# Patient Record
Sex: FEMALE | Race: WHITE
[De-identification: names, ages, dates, MRNs, and addresses within clinical notes are randomized per-mention and may not be internally consistent; named-entity substitution may affect disease eponyms.]

---

## 2017-05-01 ENCOUNTER — HOSPITAL ENCOUNTER (OUTPATIENT)
Dept: HOSPITAL 22 - LAB | Age: 37
End: 2017-05-01
Attending: FAMILY MEDICINE
Payer: MEDICAID

## 2017-05-01 DIAGNOSIS — R53.83: Primary | ICD-10-CM

## 2017-05-01 LAB
BASOPHILS # BLD AUTO: 4.2 K/MM3 (ref 0.7–4.5)
BUN: 12 MG/DL (ref 7–18)
EOSINOPHIL NFR BLD AUTO: 30.8 % (ref 10–50)
GFR SERPLBLD BASED ON 1.73 SQ M-ARVRAT: 56 ML/MIN (ref 59–?)
HCT VFR BLD CALC: 13.3 G/DL (ref 12.2–16.2)

## 2017-05-03 LAB — RA LATEX TURBID.: 16.9 IU/ML (ref 0–13.9)

## 2017-09-14 ENCOUNTER — HOSPITAL ENCOUNTER (EMERGENCY)
Dept: HOSPITAL 22 - ER | Age: 37
Discharge: HOME | End: 2017-09-14
Payer: MEDICAID

## 2017-09-14 VITALS — HEIGHT: 62 IN | WEIGHT: 128 LBS | BODY MASS INDEX: 23.55 KG/M2

## 2017-09-14 VITALS — DIASTOLIC BLOOD PRESSURE: 95 MMHG | SYSTOLIC BLOOD PRESSURE: 134 MMHG

## 2017-09-14 DIAGNOSIS — L02.821: Primary | ICD-10-CM

## 2017-09-14 DIAGNOSIS — F17.210: ICD-10-CM

## 2017-09-14 DIAGNOSIS — Z88.3: ICD-10-CM
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
## 2017-09-18 ENCOUNTER — HOSPITAL ENCOUNTER (OUTPATIENT)
Dept: HOSPITAL 22 - 2ND | Age: 37
Setting detail: OBSERVATION
LOS: 1 days | Discharge: HOME | End: 2017-09-19
Attending: FAMILY MEDICINE | Admitting: FAMILY MEDICINE
Payer: MEDICAID

## 2017-09-18 VITALS — SYSTOLIC BLOOD PRESSURE: 138 MMHG | DIASTOLIC BLOOD PRESSURE: 90 MMHG

## 2017-09-18 VITALS — HEIGHT: 62 IN | WEIGHT: 116 LBS | BODY MASS INDEX: 21.35 KG/M2

## 2017-09-18 VITALS — SYSTOLIC BLOOD PRESSURE: 135 MMHG | DIASTOLIC BLOOD PRESSURE: 88 MMHG

## 2017-09-18 VITALS — DIASTOLIC BLOOD PRESSURE: 88 MMHG | SYSTOLIC BLOOD PRESSURE: 135 MMHG

## 2017-09-18 VITALS — DIASTOLIC BLOOD PRESSURE: 90 MMHG | SYSTOLIC BLOOD PRESSURE: 138 MMHG

## 2017-09-18 DIAGNOSIS — L03.221: Primary | ICD-10-CM

## 2017-09-18 DIAGNOSIS — Z72.0: ICD-10-CM

## 2017-09-18 LAB
BASOPHILS # BLD AUTO: 2.7 K/MM3 (ref 0.7–4.5)
BUN: 9 MG/DL (ref 7–18)
EOSINOPHIL NFR BLD AUTO: 17.4 % (ref 10–50)
GFR SERPLBLD BASED ON 1.73 SQ M-ARVRAT: 93 ML/MIN (ref 59–?)
HCT VFR BLD CALC: 14.4 G/DL (ref 12.2–16.2)
LYMPHOCYTES NFR SPEC AUTO: 70 % (ref 42–76)

## 2017-09-18 PROCEDURE — G0378 HOSPITAL OBSERVATION PER HR: HCPCS

## 2017-09-18 NOTE — PHARMACY CLINIC NOTE
Patient Demographics
 
Patient Demographics
Admission date:
17
 
Date: 17
Time: 161
Allergies
Coded Allergies:
sulfamethoxazole (From Bactrim) (Severe, ANAPHYLAXIS 17)
trimethoprim (From Bactrim) (Severe, ANAPHYLAXIS 17)
 
HEIGHT- FT: 5
IN: 2.00
K.617
 
 
VTE General Information
Disclaimer
The following section includes nursing documentation that has been pulled in for
pharmacy review.
 
Patient's VTE score: 0
Patient's VTE Risk: VERY LOW RISK
Clinical trial participant? No
 
VTE prophylaxis
NQF 0371
   VTE prophylaxis ordered? Yes
Type of prophylaxis/treatment: LESLYE
 
Electronically Signed by BELÉN YIN on 17 at 1613

## 2017-09-18 NOTE — PHARMACY CLINIC NOTE
Patient Demographics
 
Patient Demographics
Admission date:
17
 
Date: 17
Time: 161
Allergies
Coded Allergies:
sulfamethoxazole (From Bactrim) (Severe, ANAPHYLAXIS 17)
trimethoprim (From Bactrim) (Severe, ANAPHYLAXIS 17)
 
HEIGHT- FT: 5
IN: 2.00
K.617
 
 
VTE General Information
Disclaimer
The following section includes nursing documentation that has been pulled in for
pharmacy review.
 
Patient's VTE score: 0
Patient's VTE Risk: VERY LOW RISK
Clinical trial participant? No
 
VTE prophylaxis
NQF 0371
   VTE prophylaxis ordered? Yes
Type of prophylaxis/treatment: LESLYE
 
Electronically Signed by BELÉN YIN on 17 at 1612

## 2017-09-19 ENCOUNTER — HOSPITAL ENCOUNTER (OUTPATIENT)
Dept: HOSPITAL 22 - COP | Age: 37
End: 2017-09-19
Attending: FAMILY MEDICINE
Payer: MEDICAID

## 2017-09-19 VITALS — DIASTOLIC BLOOD PRESSURE: 61 MMHG | SYSTOLIC BLOOD PRESSURE: 104 MMHG

## 2017-09-19 VITALS — SYSTOLIC BLOOD PRESSURE: 138 MMHG | DIASTOLIC BLOOD PRESSURE: 78 MMHG

## 2017-09-19 VITALS — SYSTOLIC BLOOD PRESSURE: 119 MMHG | DIASTOLIC BLOOD PRESSURE: 81 MMHG

## 2017-09-19 VITALS — HEIGHT: 62 IN | BODY MASS INDEX: 21.16 KG/M2 | WEIGHT: 115 LBS

## 2017-09-19 VITALS — DIASTOLIC BLOOD PRESSURE: 78 MMHG | SYSTOLIC BLOOD PRESSURE: 138 MMHG

## 2017-09-19 DIAGNOSIS — L03.221: Primary | ICD-10-CM

## 2017-09-19 LAB
BASOPHILS # BLD AUTO: 3.7 K/MM3 (ref 0.7–4.5)
BUN: 5 MG/DL (ref 7–18)
EOSINOPHIL NFR BLD AUTO: 28.1 % (ref 10–50)
GFR SERPLBLD BASED ON 1.73 SQ M-ARVRAT: 111 ML/MIN (ref 59–?)
HCT VFR BLD CALC: 12.9 G/DL (ref 12.2–16.2)

## 2017-09-19 PROCEDURE — G0463 HOSPITAL OUTPT CLINIC VISIT: HCPCS

## 2017-09-19 NOTE — DISCHARGE SUMMARY STANDARD
Demographics
Admit date: 09/18/17
Discharge date: 09/19/17
 
History of present illness
History of present illness
this wf dev post neck swelling with tenderness and was seen in [facility] ed over the 
weekend and given abx- pain and swelling continued and went to another ed with i
/d and started on another abx- she was seen in the pcp office with persistant 
pain and swelling despite op rx and was admitted for iv abx - culture taken in 
office and no fever or diabetes but has pain and dec po intake -
 
Hospital Course
Hospital Course:
pt did well with ivf and painmeds and iv abx - doing better and wishes to 
continue as op- culture pending- no abscess and wound appears to be draining 
 
Discharge diagnoses
Problem List
 1. Cellulitis
 
 2. Tobacco use
 
 
Medications
Medications:
Discharge meds are as noted.
 
Comment:
will do vancomycin bid 1 g x 9 more days -with po pain meds and will see in 
office friday 
 
Follow up
Follow up in office in: 4 DAYS
with:
Adam Deluca MD
 
Electronically Signed by Adam Deluca MD on 09/19/17 at 5055

## 2017-09-19 NOTE — DISCHARGE SUMMARY STANDARD
Demographics
Admit date: 09/18/17
Discharge date: 09/19/17
 
History of present illness
History of present illness
this wf dev post neck swelling with tenderness and was seen in [place] ed over the 
weekend and given abx- pain and swelling continued and went to another ed with i
/d and started on another abx- she was seen in the pcp office with persistant 
pain and swelling despite op rx and was admitted for iv abx - culture taken in 
office and no fever or diabetes but has pain and dec po intake -
 
Hospital Course
Hospital Course:
pt did well with ivf and painmeds and iv abx - doing better and wishes to 
continue as op- culture pending- no abscess and wound appears to be draining 
 
Discharge diagnoses
Problem List
 1. Cellulitis
 
 2. Tobacco use
 
 
Medications
Medications:
Discharge meds are as noted.
 
Comment:
will do vancomycin bid 1 g x 9 more days -with po pain meds and will see in 
office friday 
 
Follow up
Follow up in office in: 4 DAYS
with:
Adam Deluca MD
 
Electronically Signed by Adam Deluca MD on 09/19/17 at 4735

## 2017-09-19 NOTE — ACUTE CARE PROGRESS NOTE (QUA)
Progress Notes
 
Subjective
Date 17
Time 0745
Note
doing better
Patient/family reports: feeling better
Nursing reports: pain
 
Objective
Findings
Last VS-Temp:98.4 B/P:138/78  Pulse:80 Resp:20 SaO2:99    ROOM AIR 
Last weight lbs:116 oz:0 K.617 Method:Bed Scales 
 
 
Exam
   General appearance: alert, awake
   Eyes: PERRLA
   ENT: dry mucous membranes
   Neck: no JVD
   Cardiovascular: regular rate & rhythm, no murmur
   Respiratory: no respiratory distress
   ABD: soft
   Genitourinary: no hematuria
   Extremities: moves all
   Musculoskeletal: equal muscle strength
   Skin: draining area post neck region , 3x2 cm 
   Neuro: alert, CNs II-XII nml as tested
Reviewed: allergies, medications, vital signs, lab results
 
Assessment/Plan
Problem List
 1. Cellulitis
 
 2. Tobacco use
 
Patient condition Improving
Plan: initiate discharge plan
This inpt stay is expected to cross 2 MNs from start of care Yes
Comments:
will d/c and continue care as op
 
Antibiotic Stewardship (2)
Current Culture Results
Microbiology
 1415  NECK: Wound Culture - RECD
 
 
Infxn that will respond? Yes
Right drug,dose,and route? Yes
More targeted antbx? No
How long atbx needed? 9
 
Electronically Signed by Adam Deluca MD on 17 at 0748

## 2017-09-20 ENCOUNTER — HOSPITAL ENCOUNTER (OUTPATIENT)
Dept: HOSPITAL 22 - COP | Age: 37
End: 2017-09-20
Attending: INTERNAL MEDICINE
Payer: MEDICAID

## 2017-09-20 VITALS — DIASTOLIC BLOOD PRESSURE: 68 MMHG | SYSTOLIC BLOOD PRESSURE: 118 MMHG

## 2017-09-20 VITALS — SYSTOLIC BLOOD PRESSURE: 119 MMHG | DIASTOLIC BLOOD PRESSURE: 72 MMHG

## 2017-09-20 VITALS — SYSTOLIC BLOOD PRESSURE: 116 MMHG | DIASTOLIC BLOOD PRESSURE: 64 MMHG

## 2017-09-20 DIAGNOSIS — L03.221: Primary | ICD-10-CM

## 2017-09-20 PROCEDURE — G0463 HOSPITAL OUTPT CLINIC VISIT: HCPCS

## 2017-09-21 ENCOUNTER — HOSPITAL ENCOUNTER (OUTPATIENT)
Dept: HOSPITAL 22 - COP | Age: 37
Discharge: HOME | End: 2017-09-21
Attending: FAMILY MEDICINE
Payer: MEDICAID

## 2017-09-21 VITALS — SYSTOLIC BLOOD PRESSURE: 126 MMHG | DIASTOLIC BLOOD PRESSURE: 81 MMHG

## 2017-09-21 VITALS — WEIGHT: 115 LBS | BODY MASS INDEX: 21.16 KG/M2 | HEIGHT: 62 IN

## 2017-09-21 VITALS — DIASTOLIC BLOOD PRESSURE: 78 MMHG | SYSTOLIC BLOOD PRESSURE: 145 MMHG

## 2017-09-21 DIAGNOSIS — L03.221: Primary | ICD-10-CM

## 2017-09-21 DIAGNOSIS — Z48.00: ICD-10-CM

## 2017-09-21 PROCEDURE — G0463 HOSPITAL OUTPT CLINIC VISIT: HCPCS

## 2017-09-22 ENCOUNTER — HOSPITAL ENCOUNTER (OUTPATIENT)
Dept: HOSPITAL 22 - COP | Age: 37
Discharge: HOME | End: 2017-09-22
Attending: FAMILY MEDICINE
Payer: MEDICAID

## 2017-09-22 VITALS — DIASTOLIC BLOOD PRESSURE: 70 MMHG | SYSTOLIC BLOOD PRESSURE: 122 MMHG

## 2017-09-22 VITALS — SYSTOLIC BLOOD PRESSURE: 123 MMHG | DIASTOLIC BLOOD PRESSURE: 70 MMHG

## 2017-09-22 VITALS — DIASTOLIC BLOOD PRESSURE: 71 MMHG | SYSTOLIC BLOOD PRESSURE: 128 MMHG

## 2017-09-22 DIAGNOSIS — L03.221: Primary | ICD-10-CM

## 2017-09-25 ENCOUNTER — HOSPITAL ENCOUNTER (OUTPATIENT)
Dept: HOSPITAL 22 - COP | Age: 37
Discharge: HOME | End: 2017-09-25
Attending: FAMILY MEDICINE
Payer: MEDICAID

## 2017-09-25 VITALS — SYSTOLIC BLOOD PRESSURE: 113 MMHG | DIASTOLIC BLOOD PRESSURE: 70 MMHG

## 2017-09-25 VITALS — DIASTOLIC BLOOD PRESSURE: 79 MMHG | SYSTOLIC BLOOD PRESSURE: 114 MMHG

## 2017-09-25 VITALS — SYSTOLIC BLOOD PRESSURE: 109 MMHG | DIASTOLIC BLOOD PRESSURE: 74 MMHG

## 2017-09-25 DIAGNOSIS — L03.221: Primary | ICD-10-CM

## 2017-09-25 PROCEDURE — G0463 HOSPITAL OUTPT CLINIC VISIT: HCPCS

## 2017-10-11 NOTE — EXTERNAL MEDICAL SUMMARY RPT
Patient Summary
 Created on: 10/06/2017
 
ANTONI NAIR
External Reference #: [id number]
: 76
Sex: Female
 
Demographics
 
 
 
 Address  [address] [place]  [address]
 
 Preferred Language  English
 
 Marital Status  Unknown
 
 Temple Affiliation  Unknown
 
 Race  Unknown
 
 Ethnic Group  Unknown
 
 
Author
 
 
 
 Author            ,            ALFREDO RUIZROSALVA
 
 Address  Unknown
 
 Phone  [email or web address]
 
 
 
Care Team Providers
 
 
 
 Care Team Member Name  Role  Phone
 
 CITLALLI MOSQUERA,   Unavailable  Unavailable
 
 CITLALLI MOSQUERA JR, CHARLES F,  Unavailable  Unavailable
 
  JEAN-PAUL MUKHERJEE JR  
 
    
 
 [facility]   Unavailable  Unavailable
 
 [facility]   
 
 [facility]  
 
    
 
 [facility]   Unavailable  Unavailable
 
 [facility]   
 
 [facility],   Unavailable  Unavailable
 
 [facility]   
 
 [facility]   Unavailable  Unavailable
 
 [facility], [facility]   
 
 [facility]   
 
 CHAR REYNOSO,   Unavailable  Unavailable
 
 EDGARDO, CHAR B   
 
 [facility],   Unavailable  Unavailable
 
 [facility]   
 
 [facility],   Unavailable  Unavailable
 
 [facility]   
 
 [facility]   Unavailable  Unavailable
 
 [facility] [place]   
 
 JULIANNE PEÑALOZA,   Unavailable  Unavailable
 
 JULIANNE PEÑALOZA GONZALO, ROBERT GONZALO   Unavailable  Unavailable
 
 KANE JR, KANE JR   Unavailable  Unavailable
 
 CLEMENTE, CLEMENTE   Unavailable  Unavailable
 
 CLEMENTE LIDA, CLEMENTE   Unavailable  Unavailable
 
 LIDA   
 
 KARLOS CASTREJON MD,   Unavailable  Unavailable
 
 GLENYS DWYER MD LIDA   Unavailable  Unavailable
 
 HARPEL LUCIANO, HARPEL   Unavailable  Unavailable
 
 LUCIANO   
 
 BRANDOLKARLOS R,   Unavailable  Unavailable
 
 HARPEL, KARLOS R   
 
 [facility]   Unavailable  Unavailable
 
 [facility]   
 
 [facility]   
 
 HARUZAIR QUIROZ,   Unavailable  Unavailable
 
 HARFRED RIOS JR,   Unavailable  Unavailable
 
 [facility]   
 
 [facility],  Unavailable  Unavailable
 
  [facility]  
 
    
 
 BRADLEY HUERTA   Unavailable  Unavailable
 
 BRADLEY MARTINEZ   Unavailable  Unavailable
 
 NAN   
 
 [facility]   Unavailable  Unavailable
 
 [facility]   
 
 [facility]   
 
 [facility],   Unavailable  Unavailable
 
 [facility]   
 
 REHMAN TANIA, REHMAN   Unavailable  Unavailable
 
 TANIA   
 
 SHAHLA KRUEGER,   Unavailable  Unavailable
 
 SHAHLA KRUEGER EMMETT P,   Unavailable  Unavailable
 
 SMILEY BRENNAN   
 
 [facility]   Unavailable  Unavailable
 
 URGENT TREAT,   
 
 [facility]   
 
 URGENT TREAT   
 
 [facility]   Unavailable  Unavailable
 
 [facility]   
 
 [facility]   Unavailable  Unavailable
 
 LAB, PATHOLOGY &   
 
 CYTOLOGY LAB   
 
 PETTEY, PETTEY   Unavailable  Unavailable
 
 RADMANESH, RADMANESH   Unavailable  Unavailable
 
 [id number],  Unavailable  Unavailable
 
  [id number]  
 
    
 
 [id number],  Unavailable  Unavailable
 
  [id number]  
 
    
 
 [facility]   Unavailable  Unavailable
 
 [id number] [address] 0391, [facility]   
 
 [facility] [id number] #   
 
 0391   
 
 ARTIE GLORY, ARTIE   Unavailable  Unavailable
 
 GLORY   
 
 SCALF TANIA, SCALF TANIA   Unavailable  Unavailable
 
 [facility]   Un[facility]  Unavailable
 
 [facility],   
 
 [facility]   
 
 [facility]   
 
 [facility]   Unavailable  Unavailable
 
 [facility],   
 
 [facility]   
 
 EMERGENCY SERVI   
 
 NILDA SHE,   Unavailable  Unavailable
 
 [place] [facility]   
 
 XIAO GARIBAY,   Unavailable  Unavailable
 
 XIAO GARIBAY   
 
 [facility]   Unavailable  Unavailable
 
 [facility],   
 
 [facility]   
 
 [facility]   
 
 SWINEY PAT, SWINEY   Unavailable  Unavailable
 
 PAT   
 
 YUSUF CALHOUN,   Unavailable  Unavailable
 
 YUSUF CALHOUN   
 
 [facility]   Unavailable  Unavailable
 
 [address], [facility]   
 
 [facility]   
 
 [facility]   Unavailable  Unavailable
 
 [address], [facility]   
 
 [facility]   
 
 [facility] #   Unavailable  Unavailable
 
 [id number], [facility]   
 
 [facility] # [id number]   
 
 FRED FAJARDO,   Unavailable  Unavailable
 
 FRED FAJARDO RYAN B, TANA,   Unavailable  Unavailable
 
 ALEX NATHAN   
 
                                            
 
Purpose
                      Continuity of Care Document - 2008 through 10-06-
2017                                                                            
                     
 
Problems
                      
 
 
 Code         Diagnosis    DOS          Provider     Status     
 
                                                               
 
               
 
         LATERAL   2017   [facility]              
 
              EPICONDYLIT               PHYSICIANS              
 
      IS RIGHT           GROUP                   
 
  ELBOW                       
 
                  
 
      
 
         CARPAL   2017   [facility]              
 
              TUNNEL                PHYSICIANS              
 
      SYNDROME           GROUP                   
 
  RIGHT UPPER                 
 
   LIMB           
 
                
 
      
 
         LESION OF   2017   [facility]              
 
              ULNAR NERVE               PHYSICIANS              
 
       RIGHT           GROUP                   
 
  UPPER LIMB                  
 
                  
 
           
 
 [id number]       PAIN IN   2017   [facility]              
 
              RIGHT ELBOW               PHYSICIANS              
 
                           GROUP                   
 
                          
 
      
 
         MEDIAL   2017   [facility]              
 
              EPICONDYLIT               PHYSICIANS              
 
      IS RIGHT           GROUP                   
 
  ELBOW                       
 
                  
 
      
 
 [id number]         LOW BACK   2017   [facility]              
 
              PAIN                      PHYSICIANS              
 
                           GROUP                   
 
                  
 
      
 
         OTHER   2017   [facility]              
 
              FATIGUE                   PHYSICIANS              
 
                           GROUP                   
 
                      
 
      
 
         SCIATICA   2017   [facility]              
 
              LEFT SIDE                 [facility]ALTH              
 
                           SOLUTIONS              
 
          IN            
 
              
 
 [id number]       CELLULITIS   2017   [facility]              
 
              OF RIGHT                HEALTH              
 
      LOWER LIMB           SOLUTIONS              
 
                IN            
 
                     
 
         CUTANEOUS   2017   [facility]              
 
              ABSCESS OF                HEALTH              
 
      FACE                 SOLUTIONS              
 
                IN            
 
              
 
         UNSPECIFIED  2017   [facility]             
 
               OTITIS                N EMERGENCY             
 
      EXTERNA            SERVI                  
 
  LEFT EAR                    
 
                   
 
         
 
 I10          ESSENTIAL   2017   [facility]             
 
              PRIMARY                N EMERGENCY             
 
      HYPERTENSIO           SERVI                  
 
  N                           
 
                
 
 [id number]         OTHER   2016   [facility]              
 
      AND           URGENT              
 
  IDIOPATHIC    TREAT         
 
  NEUROPATHIE               
 
  S               
 
             
 
 M542         CERVICALGIA  2016   [facility]              
 
                       URGENT              
 
    TREAT         
 
                
 
      
 
         ACUTE   2016   [facility]              
 
              SINUSITIS                [facility]              
 
      UNSPECIFIED          URGENT              
 
                TREAT         
 
                        
 
      
 
 J028         ACUTE   2016   [facility]              
 
              PHARYNGITIS               [facility]              
 
       DUE TO           URGENT              
 
  OTHER SPEC    TREAT         
 
  ORGANISMS                 
 
                  
 
          
 
 N951         MENOPAUSAL   2016   [facility]              
 
              AND FEMALE                [facility]               
 
      CLIMACTERIC                                  
 
   STATES               
 
                
 
        
 
 [id number]       ENCOUNTER   2016   [facility]              
 
              GYN EXAM                [facility]               
 
      GENERAL RTN                                  
 
   W/O            
 
  ABNORMAL    
 
  FIND          
 
                
 
 [id number]       PAIN IN   2015   [facility]              
 
              RIGHT KNEE                PHYSICIANS              
 
                           GROUP                   
 
                         
 
      
 
         INTERVERTEB  2015   [facility]              
 
              RAL DISC                PHYSICIANS              
 
      D/O           GROUP                   
 
  W/RADICULOP                 
 
  ATHY LUMB      
 
  RGN           
 
               
 
 Z720         TOBACCO USE  2015   [facility]              
 
                                        PHYSICIANS              
 
                       GROUP                   
 
                  
 
      
 
 71157        ASTHMA,   2015   [facility]              
 
              UNSPECIFIED               PHYSICIANS              
 
      ,           GROUP                   
 
  UNSPECIFIED                 
 
   STATUS         
 
                
 
        
 
 7242         LUMBAGO      2015   [facility]              
 
                                        PHYSICIANS              
 
                   GROUP                   
 
                  
 
      
 
 V571         OTHER   04-   [facility]              
 
              PHYSICAL                [facility]              
 
      THERAPY              INC                     
 
                             
 
        
 
 220          BENIGN   2015   [facility]              
 
              NEOPLASM OF               PHYSICIANS              
 
       OVARY               GROUP                   
 
                              
 
         
 
 6146         PELVIC   2015   [facility]              
 
              PERITONEAL                LLC                     
 
      ADHESIONS,                                  
 
  FEMALE          
 
                
 
       
 
 6259         UNSPEC   2015   [facility]              
 
              SYMPTOM                PHYSICIANS              
 
      ASSOC           GROUP                   
 
  W/FEMALE                  
 
  GENITAL      
 
  ORGANS        
 
                
 
       
 
 47073        ABDOMINAL   2015   [facility]              
 
              PAIN,                ANESTH OF              
 
      UNSPECIFIED          THE BLUE                
 
   SITE                       
 
                     
 
      
 
 60102        UNSPECIFIED  2015   [facility]              
 
               VAGINITIS                [facility]               
 
      AND                                   
 
  VULVOVAGINI           
 
  TIS           
 
               
 
 6258         OT SPEC   2015   [facility]              
 
              SYMPTOM                [facility]              
 
      ASSOC           INC                     
 
  W/FEMALE                 
 
  GENITAL    
 
  ORGANS        
 
                
 
       
 
         PRE-PROCEDU  2015   [facility]              
 
              RAL                [facility]              
 
      LABORATORY           INC                     
 
  EXAMINATION                
 
                
 
            
 
 0794         HUMAN   02-   [facility]              
 
              PAPILLOMA                LLC                     
 
      VIRUS IN                                  
 
  CCE & UNS      
 
  SITE          
 
                
 
 51867        MILD   02-   [facility]              
 
              DYSPLASIA                LLC                     
 
      OF CERVIX                                   
 
                  
 
          
 
 6268         OTH D/O   02-   [facility]              
 
              MENSTRUATIO               [facility]              
 
      N&OTH ABN           INC                     
 
  BLEED FE                 
 
  GNT TRACT     
 
                
 
          
 
 6269         UNS D/O   02-   [facility]              
 
              MENSTRUATIO               ANESTH OF              
 
      N&OTH ABN           THE BLUE                
 
  BLEED FE                  
 
  GNT TRACT          
 
                
 
          
 
 6262         EXCESSIVE   2015   [facility]              
 
              OR FREQUENT               [facility]              
 
                 INC                     
 
  MENSTRUATIO                
 
  N             
 
             
 
         OTHER   2015   [facility]              
 
              SPECIFIED                [facility]              
 
      PRE-OPERATI          INC                     
 
  VE                 
 
  EXAMINATION   
 
                
 
            
 
 7862         COUGH        2015   [facility]              
 
                                        [facility]               
 
                                         
 
            
 
 6202         OTHER AND   2015   [facility]              
 
              UNSPECIFIED               [facility]              
 
       OVARIAN           INC                     
 
  CYST                       
 
                
 
         ROUTINE   2015   [facility] CA[facility]RE[facility]               
 
      AL                                   
 
  EXAMINATION           
 
                
 
            
 
 60006        CHEST PAIN   2014   [facility]              
 
              UNSPECIFIED               [facility]              
 
                           INC                     
 
                         
 
 6823         CELLULITIS   2014   [facility]              
 
              AND ABSCESS               PHYSICIANS              
 
       OF UPPER           GROUP                   
 
  ARM AND                  
 
  FOREARM         
 
                
 
        
 
 6826         CELLULITIS   2014   [facility]             
 
              AND ABSCESS               N EMERGENCY             
 
       OF LEG            PHYSI                  
 
  EXCEPT FOOT                 
 
                   
 
            
 
 7802         SYNCOPE AND  2014   [facility]             
 
               COLLAPSE                 N EMERGENCY             
 
                            PHYSI                  
 
                        
 
       
 
 6829         CELLULITIS   10-   [facility]              
 
              AND ABSCESS               PHYSICIANS              
 
       OF           GROUP                   
 
  UNSPECIFIED                 
 
   SITE           
 
                
 
      
 
 3829         UNSPECIFIED  2014   [facility]              
 
               OTITIS                PHYSICIANS              
 
      MEDIA                GROUP                   
 
                              
 
        
 
 [id number]        OTHER   2014   [facility]              
 
              MALAISE AND               [facility]              
 
       FATIGUE             INC                     
 
                             
 
         
 
 [id number]        CONTACT   2014   [facility]             
 
              DERMATITIS&               N EMERGENCY             
 
      OTHER            PHYSI                  
 
  ECZEMA DUE                  
 
  TO SUNBURN       
 
                
 
           
 
 3540         CARPAL   2010   [facility]              
 
              TUNNEL                FR[facility]               
 
    SYNDROME                                     
 
                        
 
         
 
 27476        OTHER   2010   [facility]              
 
              TENOSYNOVIT               FR[facility]               
 
    IS OF HAND                                   
 
  AND WRIST             
 
                
 
          
 
 4660         ACUTE   2010   [facility]              
 
              BRONCHITIS                B                       
 
                                              
 
             
 
 7821         RASH AND   2010   [facility]              
 
              OTHER                B                       
 
    NONSPECIFIC                               
 
   SKIN      
 
  ERUPTION      
 
                
 
         
 
 7241         PAIN IN   04-   [facility]             
 
              THORACIC                N EMERGENCY             
 
    SPINE                 PHYS INC               
 
                              
 
            
 
 33955        ACUTE   2010   [facility],              
 
              DERMATITIS                FR[facility] M               
 
    DUE TO                                   
 
  SOLAR            
 
  RADIATION     
 
                
 
          
 
 6926         CONTACT   2010   [facility]             
 
              DERMATITIS&               N EMERGENCY             
 
    OTHER            PHYS INC               
 
  ECZEMA DUE                  
 
  TO PLANTS           
 
                
 
          
 
 3670         HYPERMETROP  2010   [facility]               
 
                                               
 
            
 
 2724         OTHER AND   2010   [facility]              
 
              UNSPECIFIED               [facility]                
 
  HYPERLIPIDE                 
 
  AJ                
 
               
 
 2859         UNSPECIFIED  2009   [facility]              
 
               ANEMIA                   [facility]                
 
                      
 
         
 
 4739         UNSPECIFIED  2008   [facility]             
 
               SINUSITIS                N EMERGENCY             
 
                          PHYS INC               
 
                         
 
          
 
 5990         URINARY   2008   [facility]              
 
              TRACT                [facility]              
 
    INFECTION           INC                     
 
  SITE NOT                 
 
  SPECIFIED     
 
                
 
          
 
 96613        UNSPECIFIED  2008   [facility]                                        
 
                     
 
      
 
 56678        SWELLING OF  2008   [facility]              
 
               LIMB                     RADIOLOGY              
 
                         ASSOCIATES              
 
      PSC           
 
               
 
 5693         HEMORRHAGE   2008   [facility]              
 
              OF RECTUM                [facility]              
 
    AND ANUS             INC                     
 
                             
 
         
 
 86722        CALCU   2008   PATHOLOGY &             
 
              GALLBLADD                 CYTOLOGY              
 
    W/OTH           LAB                     
 
  CHOLECYST                 
 
  W/O MENTION   
 
   OBST         
 
                
 
      
 
 58633        CALCU   2008   NAUN MUKHERJEE JR                JEAN-PAUL F               
 
    W/O MENTION                                  
 
              
 
  CHOLECYST/O   
 
  BST           
 
               
 
 48224        ACUTE AND   2008   [facility] CA[facility]RE[facility] MD               
 
    CHOLECYSTIT                                  
 
  IS                    
 
              
 
 5753         HYDROPS OF   2008   [facility]              
 
              GALLBLADDER               [facility]              
 
                         INC                     
 
                         
 
 6141         CHRONIC   2008   [facility]              
 
              SALPINGITIS               [facility]              
 
     AND           INC                     
 
  OOPHORITIS                 
 
                
 
           
 
 6142         SALPINGITIS  2008   PATHOLOGY &             
 
              &OOPHORITIS                CYTOLOGY              
 
     NOT ACUT           LAB                     
 
  SUBACUT/CHR                
 
  N             
 
             
 
 6200         FOLLICULAR   2008   [facility] &             
 
              CYST OF                 CYTOLOGY              
 
    OVARY                LAB                     
 
                             
 
      
 
 6201         CORPUS   2008   PATHOLOGY &             
 
              LUTEUM CYST                CYTOLOGY              
 
     OR           LAB                     
 
  HEMATOMA                   
 
                
 
         
 
 6210         POLYP OF   2008   [facility]              
 
              CORPUS                [facility]              
 
    UTERI                INC                     
 
                             
 
      
 
 26739        HEMORRHAGE   2008   ALLAN MUKHERJEE JR                                   
 
  PROCEDURE            
 
  NEC           
 
               
 
         LAPAROSCOPI  2008   [facility] JR               
 
    PROC                                   
 
  COVERTED            
 
  OPEN PROC     
 
                
 
          
 
         SPECIAL SCR  2008   [facility]              
 
                              [place] [facility]                  
 
    EXAMINATION                                  
 
   OTH SPEC         
 
  CHLAMYDIAL    
 
  DZ            
 
              
 
 V745         SCREENING   2008   [facility]              
 
              EXAMINATION               PerformLine                  
 
     FOR                                   
 
  VENEREAL         
 
  DISEASE       
 
                
 
        
 
 V762         SCREENING   2008   [facility]              
 
              FOR                [place] [facility]                  
 
    MALIGNANT                                   
 
  NEOPLASM OF        
 
   THE CERVIX   
 
                
 
            
 
 V780         SCREENING   2008   [facility]              
 
              FOR IRON                [facility]               
 
    DEFICIENCY                                   
 
  ANEMIA                
 
                
 
       
 
                                                                                
                                                                                
                                                                                
                                                                                
                                                                                
                                                                                
                                                                                
                                                                                
                                                                                
                                                                                
                                                          
 
Medications
                      
 
 
 Na  ND  Rx  Da  Fi  Fi  Am  Da  Di  Ph  RX  Ph  St
 
 me  C   No  te  ll  ll  ou  ys  ag  ar   #  ys  at
 
         rm        s   nt      no  ma      ic  us
 
             Or  Da              si  cy      ia    
 
             de  te              s           n     
 
             re                                    
 
             d                                     
 
                                                   
 
                                                   
 
                                                   
 
                                                  
 
                                   
 
                           
 
                      
 
               
 
              
 
 TI  60      09  09      30  30          00  CA  Ac
 
 ZA  50      -0  -2      .0              00  RL  ti
 
 NI  50      5-  9-      00              00  IS  ve
 
 DI  25      20  20                      77  LE    
 
 NE  20      17  17                      86       
 
    2                                   69  DR    
 
 HC                                          UG    
 
 L                                           S     
 
 4                                                 
 
 MG                                               
 
                                            
 
 TA                                       
 
 BL                                    
 
 ET                                    
 
                                
 
                           
 
                          
 
                        
 
               
 
               
 
               
 
               
 
               
 
 GE  24      08  09      5.  5           00  CA  Ac
 
 NT  20      -2  -2      00              00  RL  ti
 
 AM  80      9-  2-      0               00  IS  ve
 
 IC  58      20  20                      77  LE    
 
 IN  06      17  17                      99       
 
    0                                   88  DR    
 
 0.                                          UG    
 
 3%                                          S     
 
                                                  
 
 EY                                              
 
 E                                           
 
 DR                                      
 
 OP                                    
 
 S                                     
 
                                
 
                           
 
                          
 
                        
 
               
 
               
 
               
 
               
 
              
 
 TI  60      08  08      30  30          00  CA  Ac
 
 ZA  50      -0  -2      .0              00  RL  ti
 
 NI  50      2-  5-      00              00  IS  ve
 
 DI  25      20  20                      77  LE    
 
 NE  20      17  17                      86       
 
    2                                   69  DR LEE                                          UG    
 
 L                                           S     
 
 4                                                 
 
 MG                                               
 
                                            
 
 TA                                       
 
 BL                                    
 
 ET                                    
 
                                
 
                           
 
                          
 
                        
 
               
 
               
 
               
 
               
 
               
 
 TI  57      06  07      60  30          00  CA  Ac
 
 ZA  66      -2  -2      .0              00  RL  ti
 
 NI  40      9-  8-      00              00  IS  ve
 
 DI  50      20  20                      77  LE    
 
 NE  31      17  17                      48       
 
    8                                   46  DR    
 
 HC                                          UG    
 
 L                                           S     
 
 4                                                 
 
 MG                                               
 
                                            
 
 TA                                       
 
 BL                                    
 
 ET                                    
 
                                
 
                           
 
                          
 
                        
 
               
 
               
 
               
 
               
 
               
 
 TI  57      05  06      60  30          00  CA  Ac
 
 ZA  66      -2  -1      .0              00  RL  ti
 
 NI  40      4-  6-      00              00  IS  ve
 
 DI  50      20  20                      77  LE    
 
 NE  31      17  17                      48       
 
    8                                   46  DR    
 
 HC                                          UG    
 
 L                                           S     
 
 4                                                 
 
 MG                                               
 
                                            
 
 TA                                       
 
 BL                                    
 
 ET                                    
 
                                
 
                           
 
                          
 
                        
 
               
 
               
 
               
 
               
 
               
 
 VE  00      05  06      18  16          00  CA  Ac
 
 NT  17      -2  -1      .0              00  RL  ti
 
 OL  30      4-  6-      00              00  IS  ve
 
 IN  68      20  20                      77  LE    
 
    22      17  17                      48       
 
 HF  0                                   47  DR    
 
 A                                           UG    
 
 90                                          S     
 
                                                  
 
 MC                                               
 
 G                                           
 
 IN                                       
 
 HA                                    
 
 LE                                    
 
 R                              
 
                           
 
                          
 
                        
 
               
 
               
 
               
 
               
 
               
 
              
 
 AC  00      05  05      60  30          00  CA  Ac
 
 ET  09      -0  -2      .0              00  RL  ti
 
 AM  30      2-  6-      00              00  IS  ve
 
 IN  15      20  20                      77  LE    
 
 OP  00      17  17                      42       
 
 HE  1                                   47  DR    
 
 N-                                          UG    
 
 CO                                          S     
 
 D                                                 
 
 #3                                               
 
                                            
 
 TA                                       
 
 BL                                    
 
 ET                                    
 
                                
 
                           
 
                          
 
                        
 
               
 
               
 
               
 
               
 
               
 
 KS  00      04  05      55  10          00  SO  Ac
 
 ED  05      -2  -2      .0              00  PE  ti
 
 NI  44      7-  6-      00              00  RS  ve
 
 SO  72      20  20                      56       
 
 NE  83      17  17                      24  FA    
 
  5  1                                   57  MI    
 
                                            LY    
 
 MG                                               
 
                                            DR    
 
 TA                                         UG    
 
 BL                                          
 
 ET                                       
 
                                       
 
                                       
 
                                
 
                           
 
                           
 
                           
 
                  
 
               
 
               
 
 TI  57      04  05      60  30          00  SO  Ac
 
 ZA  66      -2  -1      .0              00  PE  ti
 
 NI  40      4-  9-      00              00  RS  ve
 
 DI  50      20  20                      55       
 
 NE  31      17  17                      37  FA    
 
    8                                   39  MI    
 
 HC                                          LY    
 
 L                                                
 
 4                                           DR    
 
 MG                                         UG    
 
                                            
 
 TA                                       
 
 BL                                    
 
 ET                                    
 
                                
 
                           
 
                           
 
                           
 
                  
 
               
 
               
 
               
 
               
 
 CE  69      04  05      30  30          00  SO  Ac
 
 LE  09      -1  -1      .0              00  PE  ti
 
 CO  70      9-  2-      00              00  RS  ve
 
 XI  42      20  20                      55       
 
 B   20      17  17                      56  FA    
 
 10  7                                   40  MI    
 
 0                                           LY    
 
 MG                                               
 
                                            DR    
 
 CA                                         UG    
 
 PS                                          
 
 UL                                       
 
 E                                     
 
                                       
 
                                
 
                           
 
                           
 
                           
 
                  
 
               
 
               
 
              
 
 VE  00      03  04      18  20          00  SO  Ac
 
 NT  17      -2  -1      .0              00  PE  ti
 
 OL  30      0-  4-      00              00  RS  ve
 
 IN  68      20  20                      55       
 
    22      17  17                      37  FA    
 
 HF  0                                   40  MI    
 
 A                                           LY    
 
 90                                               
 
                                            DR    
 
 MC                                         UG    
 
 G                                           
 
 IN                                       
 
 HA                                    
 
 LE                                    
 
 R                              
 
                           
 
                           
 
                           
 
                  
 
               
 
               
 
               
 
               
 
              
 
 TI  57      03  04      60  30          00  SO  Ac
 
 ZA  66      -2  -1      .0              00  PE  ti
 
 NI  40      2-  4-      00              00  RS  ve
 
 DI  50      20  20                      55       
 
 NE  31      17  17                      37  FA    
 
    8                                   39  MI    
 
 HC                                          LY    
 
 L                                                
 
 4                                           DR    
 
 MG                                         UG    
 
                                            
 
 TA                                       
 
 BL                                    
 
 ET                                    
 
                                
 
                           
 
                           
 
                           
 
                  
 
               
 
               
 
               
 
               
 
 CI  16      03  04      20  10          00  SO  Ac
 
 KS  57      -2  -1      .0              00  PE  ti
 
 OF  10      2-  4-      00              00  RS  ve
 
 LO  41      20  20                      55       
 
 XA  25      17  17                      95  FA    
 
 CI  0                                   47  MI    
 
 N                                           LY    
 
 HC                                               
 
 L                                           DR    
 
 50                                         UG    
 
 0                                           
 
 MG                                       
 
                                      
 
 TA                                    
 
 B                              
 
                           
 
                           
 
                           
 
                  
 
               
 
               
 
               
 
               
 
              
 
 CE  69      03  04      30  30          00  SO  Ac
 
 LE  09      -1  -0      .0              00  PE  ti
 
 CO  70      5-  7-      00              00  RS  ve
 
 XI  42      20  20                      55       
 
 B   20      17  17                      56  FA    
 
 10  7                                   40  MI    
 
 0                                           LY    
 
 MG                                               
 
                                            DR    
 
 CA                                         UG    
 
 PS                                          
 
 UL                                       
 
 E                                     
 
                                       
 
                                
 
                           
 
                           
 
                           
 
                  
 
               
 
               
 
              
 
 CL  63      03  03      40  10          00  SO  Ac
 
 IN  30      -0  -3      .0              00  PE  ti
 
 DA  40      8-  1-      00              00  RS  ve
 
 MY  69      20  20                      55       
 
 CI  20      17  17                      82  FA    
 
 N   1                                   70  MI    
 
 HC                                          LY    
 
 L                                                
 
 15                                          DR    
 
 0                                          UG    
 
 MG                                          
 
                                         
 
 CA                                    
 
 PS                                    
 
 UL                             
 
 E                         
 
                           
 
                           
 
                  
 
               
 
               
 
               
 
               
 
               
 
              
 
 TI  57      02  03      60  30          00  SO  Ac
 
 ZA  66      -2  -1      .0              00  PE  ti
 
 NI  40      0-  7-      00              00  RS  ve
 
 DI  50      20  20                      55       
 
 NE  31      17  17                      37  FA    
 
    8                                   39  MI    
 
 HC                                          LY    
 
 L                                                
 
 4                                           DR    
 
 MG                                         UG    
 
                                            
 
 TA                                       
 
 BL                                    
 
 ET                                    
 
                                
 
                           
 
                           
 
                           
 
                  
 
               
 
               
 
               
 
               
 
 PA  00      02  03      30  30          00  SO  Ac
 
 RO  37      -0  -0      .0              00  PE  ti
 
 XE  87      8-  3-      00              00  RS  ve
 
 TI  00      20  20                      55       
 
 NE  29      17  17                      56  FA    
 
    3                                   39  MI    
 
 HC                                          LY    
 
 L                                                
 
 20                                          DR    
 
                                           UG    
 
 MG                                          
 
                                         
 
 TA                                    
 
 BL                                    
 
 ET                             
 
                           
 
                           
 
                           
 
                  
 
               
 
               
 
               
 
               
 
               
 
 CE  69      02  03      30  30          00  SO  Ac
 
 LE  09      -0  -0      .0              00  PE  ti
 
 CO  70      8-  3-      00              00  RS  ve
 
 XI  42      20  20                      55       
 
 B   20      17  17                      56  FA    
 
 10  7                                   40  MI    
 
 0                                           LY    
 
 MG                                               
 
                                            DR    
 
 CA                                         UG    
 
 PS                                          
 
 UL                                       
 
 E                                     
 
                                       
 
                                
 
                           
 
                           
 
                           
 
                  
 
               
 
               
 
              
 
 HY  00      02  03      60  30          00  SO  Ac
 
 DR  18      -0  -0      .0              00  PE  ti
 
 OX  50      8-  3-      00              00  RS  ve
 
 YZ  67      20  20                      55       
 
 IN  40      17  17                      56  FA    
 
 E   5                                   41  MI    
 
 PA                                          LY    
 
 M                                                
 
 25                                          DR    
 
                                           UG    
 
 MG                                          
 
                                         
 
 CA                                    
 
 P                                     
 
                                
 
                           
 
                           
 
                           
 
                  
 
               
 
               
 
               
 
              
 
 TI  57      01  02      60  30          00  SO  Ac
 
 ZA  66      -1  -1      .0              00  PE  ti
 
 NI  40      6-  0-      00              00  RS  ve
 
 DI  50      20  20                      55       
 
 NE  31      17  17                      37  FA    
 
    8                                   39  MI    
 
 HC                                          LY    
 
 L                                                
 
 4                                           DR    
 
 MG                                         UG    
 
                                            
 
 TA                                       
 
 BL                                    
 
 ET                                    
 
                                
 
                           
 
                           
 
                           
 
                  
 
               
 
               
 
               
 
               
 
 VE  00      01  02      18  20          00  SO  Ac
 
 NT  17      -1  -1      .0              00  PE  ti
 
 OL  30      6-  0-      00              00  RS  ve
 
 IN  68      20  20                      55       
 
    22      17  17                      37  FA    
 
 HF  0                                   40  MI    
 
 A                                           LY    
 
 90                                               
 
                                            DR    
 
 MC                                         UG    
 
 G                                           
 
 IN                                       
 
 HA                                    
 
 LE                                    
 
 R                              
 
                           
 
                           
 
                           
 
                  
 
               
 
               
 
               
 
               
 
              
 
 DI  16      01  02      60  30          00  SO  Ac
 
 CL  57      -1  -1      .0              00  PE  ti
 
 OF  10      6-  0-      00              00  RS  ve
 
 EN  20      20  20                      55       
 
 AC  11      17  17                      37  FA    
 
    1                                   41  MI    
 
 SO                                          LY    
 
 D                                                
 
 EC                                          DR    
 
                                           UG    
 
 75                                          
 
                                         
 
 MG                                    
 
                                      
 
 TA                             
 
 B                         
 
                           
 
                           
 
                  
 
               
 
               
 
               
 
               
 
               
 
              
 
 AM  16      01  02      30  30          00  SO  Ac
 
 IT  72      -1  -1      .0              00  PE  ti
 
 RI  90      6-  0-      00              00  RS  ve
 
 PT  17      20  20                      55       
 
 YL  30      17  17                      37  FA    
 
 IN  1                                   42  MI    
 
 E                                           LY    
 
 HC                                               
 
 L                                           DR    
 
 50                                         UG    
 
                                            
 
 MG                                       
 
                                      
 
 TA                                    
 
 B                              
 
                           
 
                           
 
                           
 
                  
 
               
 
               
 
               
 
               
 
              
 
 ES  00      01  02      30  30          00  SO  Ac
 
 TR  55      -1  -1      .0              00  PE  ti
 
 AD  50      8-  0-      00              00  RS  ve
 
 IO  88      20  20                      55       
 
 L   60      17  17                      39  FA    
 
 1   2                                   06  MI    
 
 MG                                          LY    
 
                                                 
 
 TA                                          DR    
 
 BL                                         UG    
 
 ET                                          
 
                                          
 
                                       
 
                                       
 
                                
 
                           
 
                           
 
                           
 
                  
 
               
 
 ME  00      07  07  0   21  6       WA  70  WE  Ac
 
 TH  60      -2  -2      .0          L-  41  ST  ti
 
 YL  34      8-  8-      00          MA  64     ve
 
 KS  59      20  20                  RT  7   RI    
 
 ED  31      10  10                         CH    
 
 NI  5                               PH      AR    
 
 SO                                  AR      D     
 
 LO                                  MA      M     
 
 NE                                  CY            
 
  4                                #            
 
                                        
 
 MG                         10         
 
                        04        
 
 DO                      93      
 
 SE                       
 
 PK                     
 
                       
 
                     
 
                  
 
                  
 
                  
 
                  
 
                  
 
               
 
               
 
     00      07  07  0   20  6       CA  67  WE  Ac
 
     59      -0  -0      .0          RL  06  ST  ti
 
     10      9-  9          IS  75     ve
 
     34      20  20                  LE      RI    
 
     90      10  10                         CH    
 
     5                               DR      AR    
 
                                     UG      D     
 
                                            M     
 
                                     CO            
 
                                  MP            
 
                            AN            
 
                            Y          
 
                                 
 
                                 
 
                          
 
                        
 
                       
 
                     
 
               
 
               
 
              
 
 ME  00      07  07  0   21  6       CA  67  WE  Ac
 
 TH  78      -0  -0      .0          RL  06  ST  ti
 
 YL  15        9      00          IS  76     ve
 
 KS  02      20  20                  LE      RI    
 
 ED  20      10  10                         CH    
 
 NI  7                               DR      AR    
 
 SO                                  UG      D     
 
 LO                                         M     
 
 NE                                  CO            
 
  4                               MP            
 
                           AN            
 
 MG                         Y          
 
                                
 
 DO                              
 
 SE                       
 
 PK                     
 
                       
 
                     
 
                  
 
                  
 
                 
 
               
 
               
 
               
 
               
 
 NA  00      07  07  1   60  30      CA  67  WE  Ac
 
 KS  09      -0  -0      .0          RL  06  ST  ti
 
 OX  30      9  9      00          IS  77     ve
 
 EN  14      20  20                  LE      RI    
 
    90      10  10                         CH    
 
 50  5                               DR      AR    
 
 0                                   UG      D     
 
 MG                                         M     
 
                                    CO            
 
 TA                                MP            
 
 BL                         AN            
 
 ET                         Y          
 
                                 
 
                                 
 
                          
 
                        
 
                       
 
                     
 
                  
 
                  
 
                 
 
 AZ  59      04  04      6.  5       RI  39  WE  Ac
 
 IT  76      -3  -3      00          TE  97  ST  ti
 
 HR  23      0-  0-      0              97     ve
 
 OM  06      20  20                  AI      RY    
 
 YC  00      10  10                  D       AN    
 
 IN  1                               PH       B    
 
                                    AR            
 
 25                                  MA            
 
 0                                   CY            
 
 MG                                             
 
                              03            
 
 TA                           91         
 
 BL                         4       
 
 ET                         #       
 
                     03      
 
                   91   
 
                        
 
                        
 
                  
 
                  
 
                  
 
                  
 
                  
 
               
 
               
 
 MU  00      04  04      22  10      RI  39  WE  Ac
 
 PI  09      -3  -3      .0          TE  97  ST  ti
 
 RO  31      0-  0-      00             98     ve
 
 CI  01      20  20                  AI      RY    
 
 N   04      10  10                  D       AN    
 
 2%  2                               PH       B    
 
                                    AR            
 
 OI                                  MA            
 
 NT                                  CY            
 
 ME                                              
 
 NT                            03            
 
                               91         
 
                            4       
 
                            #       
 
                     03      
 
                   91   
 
                        
 
                        
 
                  
 
                  
 
               
 
               
 
               
 
               
 
               
 
 LO  00      04  04      10  10      RI  39  AH  Ac
 
 RA  78      -0  -0      .0          TE  72  ME  ti
 
 TA  15      5-  6-      00             56  D   ve
 
 DI  07      20  20                  AI      MU    
 
 NE  70      10  10                  D       HA    
 
    1                               PH      MM    
 
 10                                  AR      AD    
 
                                    MA       A    
 
 MG                                  CY            
 
                                                
 
 TA                            03            
 
 BL                            91         
 
 ET                         4       
 
                            #       
 
                     03      
 
                   91      
 
                           
 
                        
 
                  
 
                  
 
                  
 
                  
 
               
 
               
 
               
 
 KS  00      04  04      42  12      RI  39  AH  Ac
 
 ED  60      -0  -0      .0          TE  72  ME  ti
 
 NI  35      6-  6-      00             57  D   ve
 
 SO  33      20  20                  AI      MU    
 
 NE  82      10  10                  D       HA    
 
    1                               PH      MM    
 
 10                                  AR      AD    
 
                                    MA       A    
 
 MG                                  CY            
 
                                                
 
 TA                            03            
 
 BL                            91         
 
 ET                         4       
 
                            #       
 
                     03      
 
                   91      
 
                           
 
                        
 
                  
 
                  
 
                  
 
                  
 
               
 
               
 
               
 
 HY  68      04  04      24  6       RI  39  AH  Ac
 
 DR  46      -0  -0      .0          TE  72  ME  ti
 
 OX  20      5-  6-      00             59  D   ve
 
 YZ  36      20  20                  AI      MU    
 
 IN  10      10  10                  D       SWANN    
 
 E   1                               PH      MM    
 
 HC                                  AR      AD    
 
 L                                   MA       A    
 
 25                                  CY            
 
                                               
 
 MG                            03            
 
                              91         
 
 TA                         4       
 
 BL                         #       
 
 ET                  03      
 
                   91      
 
                           
 
                        
 
                  
 
                  
 
                  
 
                  
 
                  
 
                  
 
               
 
 FA  00      04  04      30  15      RI  39  AH  Ac
 
 MO  17      -0  -0      .0          TE  72  ME  ti
 
 TI  25      5-  6-      00             61  D   ve
 
 DI  72      20  20                  AI      MU    
 
 NE  86      10  10                  D       SWANN    
 
    0                               PH      MM    
 
 20                                  AR      AD    
 
                                    MA       A    
 
 MG                                  CY            
 
                                                
 
 TA                            03            
 
 BL                            91         
 
 ET                         4       
 
                            #       
 
                     03      
 
                   91      
 
                           
 
                        
 
                  
 
                  
 
                  
 
                  
 
               
 
               
 
               
 
 PE  00      03  03      59  2       RI  39  WE  Ac
 
 RM  47      -1  -1      .0          TE  50  ST  ti
 
 ET  25      6-  6-      00             39     ve
 
 HR  24      20  20                  AI      RI    
 
 IN  26      10  10                  D       CH    
 
    7                               PH      AR    
 
 1%                                  AR      D     
 
                                    MA      M     
 
 LO                                  CY            
 
 TI                                             
 
 ON                            03            
 
                               91         
 
                            4       
 
                            #       
 
                     03      
 
                   91      
 
                          
 
                        
 
                  
 
                  
 
               
 
               
 
               
 
               
 
               
 
 OX  00      02  02  00  15  3       RI  82  RU  Ac
 
 YC  40      -1  -2      .0          TE  15  SH  ti
 
 OD  60      6-  6-      00             74     ve
 
 ON  52      20  20                  AI      NE    
 
 -A  20      10  10                  D       IL    
 
 CE  1                               PH       C    
 
 TA                                  AR            
 
 MI                                  M             
 
 NO                                  #3            
 
 PH                                93            
 
 EN                         8             
 
                                      
 
 7.                              
 
 5-                              
 
 32                       
 
 5                   
 
                     
 
                     
 
                  
 
                 
 
               
 
               
 
               
 
               
 
              
 
     00      01  01  00  12  3       RI  38  RU  Ac
 
     40      -1  -2      .0          TE  86  SH  ti
 
     60      6-  8-      00             05     ve
 
     35      20  20                  AI      NE    
 
     70      10  10                  D       IL    
 
     5                               PH       C    
 
                                     AR            
 
                                     M             
 
                                     #3            
 
                                   91            
 
                            4             
 
                                       
 
                                 
 
                                 
 
                          
 
                     
 
                     
 
                     
 
               
 
              
 
     00      01  01  00  18  3       RI  81  RU  Ac
 
     40      -1  -2      .0          TE  69  SH  ti
 
     60      3-  8-      00             58     ve
 
     35      20  20                  AI      NE    
 
     70      10  10                  D       IL    
 
     5                               PH       C    
 
                                     AR            
 
                                     M             
 
                                     #3            
 
                                   93            
 
                            8             
 
                                       
 
                                 
 
                                 
 
                          
 
                     
 
                     
 
                     
 
               
 
              
 
 PE  00      01  01  00  40  10      RI  38  RU  Ac
 
 NI  09      -1  -2      .0          TE  86  SH  ti
 
 CI  31      5-  8-      00             02     ve
 
 LL  17      20  20                  AI      NE    
 
 IN  20      10  10                  D       IL    
 
    1                               PH       C    
 
 VK                                  AR            
 
                                    M             
 
 25                                  #3            
 
 0                                  91            
 
 MG                         4             
 
                                      
 
 TA                              
 
 BL                              
 
 ET                       
 
                     
 
                     
 
                     
 
                  
 
                 
 
               
 
               
 
               
 
               
 
     00      12  12  00  15  3       RI  81  RU  Ac
 
     40      -0  -1      .0          TE  12  SH  ti
 
     60      2-  7-      00             93     ve
 
     35      20  20                  AI      NE    
 
     70      09  09                  D       IL    
 
     5                               PH       C    
 
                                     AR            
 
                                     M             
 
                                     #3            
 
                                   93            
 
                            8             
 
                                       
 
                                 
 
                                 
 
                          
 
                     
 
                     
 
                     
 
               
 
              
 
     00      12  12  00  10  3       RI  38  WE  Ac
 
     40      -0  -1      .0          TE  46  ST  ti
 
     60      8-  7-      00             10     ve
 
     35      20  20                  AI      RI    
 
     70      09  09                  D       CH    
 
     5                               PH      AR    
 
                                     AR      D     
 
                                     M       M     
 
                                     #3            
 
                                   91            
 
                            4             
 
                                       
 
                                 
 
                                 
 
                          
 
                        
 
                       
 
                     
 
               
 
              
 
 PE  00      12  12  00  59  2       RI  38  WE  Ac
 
 RM  47      -0  -1      .0          TE  46  ST  ti
 
 ET  25      9-  7-      00             41     ve
 
 HR  24      20  20                  AI      RI    
 
 IN  26      09  09                  D       CH    
 
    7                               PH      AR    
 
 1%                                  AR      D     
 
                                    M       M     
 
 LO                                  #3            
 
 TI                                91            
 
 ON                         4             
 
                                       
 
                                 
 
                                 
 
                          
 
                        
 
                       
 
                     
 
                  
 
                 
 
 CY  00      12  12  00  20  6       RI  38  WE  Ac
 
 CL  37      -0  -1      .0          TE  46  ST  ti
 
 OB  80      8-  7-      00             11     ve
 
 EN  75      20  20                  AI      RI    
 
 ZA  11      09  09                  D       CH    
 
 KS  0                               PH      AR    
 
 IN                                  AR      D     
 
 E                                   M       M     
 
 10                                  #3            
 
                                  91            
 
 MG                         4             
 
                                      
 
 TA                              
 
 BL                              
 
 ET                       
 
                        
 
                       
 
                     
 
                  
 
                 
 
               
 
               
 
               
 
               
 
 PE  00      11  12  00  59  5       RI  38  WE  Ac
 
 RM  47      -1  -0      .0          TE  22  ST  ti
 
 ET  25      7-  3-      00             77     ve
 
 HR  24      20  20                  AI      RI    
 
 IN  26      09  09                  D       CH    
 
    7                               PH      AR    
 
 1%                                  AR      D     
 
                                    M       M     
 
 LO                                  #3            
 
 TI                                91            
 
 ON                         4             
 
                                       
 
                                 
 
                                 
 
                          
 
                        
 
                       
 
                     
 
                  
 
                 
 
 PE  00      11  11  00  40  10      RI  38  RU  Ac
 
 NI  09      -0  -1      .0          TE  05  SH  ti
 
 CI  31      3-  9-      00             63     ve
 
 LL  17      20  20                  AI      NE    
 
 IN  20      09  09                  D       IL    
 
    1                               PH       C    
 
 VK                                  AR            
 
                                    M             
 
 25                                  #3            
 
 0                                  91            
 
 MG                         4             
 
                                      
 
 TA                              
 
 BL                              
 
 ET                       
 
                     
 
                     
 
                     
 
                  
 
                 
 
               
 
               
 
               
 
               
 
     00      11  11  00  16  4       RI  80  RU  Ac
 
     40      -1  -1      .0          TE  79  SH  ti
 
     60      0-  9-      00             37     ve
 
     35      20  20                  AI      NE    
 
     70      09  09                  D       IL    
 
     5                               PH       C    
 
                                     AR            
 
                                     M             
 
                                     #3            
 
                                   93            
 
                            8             
 
                                       
 
                                 
 
                                 
 
                          
 
                     
 
                     
 
                     
 
               
 
              
 
     00      11  11  00  15  3       RI  38  RU  Ac
 
     40      -0  -1      .0          TE  05  SH  ti
 
     60      3-  9-      00             64     ve
 
     35      20  20                  AI      NE    
 
     70      09  09                  D       IL    
 
     5                               PH       C    
 
                                     AR            
 
                                     M             
 
                                     #3            
 
                                   91            
 
                            4             
 
                                       
 
                                 
 
                                 
 
                          
 
                     
 
                     
 
                     
 
               
 
              
 
 OX  00      10  11  00  15  3       RI  80  RU  Ac
 
 YC  40      -2  -0      .0          TE  62  SH  ti
 
 OD  60      9-  5-      00             84     ve
 
 ON  51      20  20                  AI      NE    
 
 E-  20      09  09                  D       IL    
 
 AC  1                               PH       C    
 
 ET                                  AR            
 
 AM                                  M             
 
 IN                                  #3            
 
 OP                                93            
 
 HE                         8             
 
 N                                     
 
 5-                              
 
 32                              
 
 5                        
 
                     
 
                     
 
                     
 
                  
 
                 
 
               
 
               
 
               
 
              
 
     00      09  10  00  18  4       WA  44  RU  Ac
 
     40      -2  -0      .0          L-  76  SH  ti
 
     60      5-  8-      00          MA  47     ve
 
     35      20  20                  RT  6   NE    
 
     70      09  09                         IL    
 
     5                               PH       C    
 
                                     AR            
 
                                     MA            
 
                                     CY            
 
                                                
 
                            #4            
 
                            93         
 
                                   
 
                                 
 
                          
 
                     
 
                     
 
                     
 
               
 
               
 
               
 
 OX  00      09  10  00  12  3       RI  37  RU  Ac
 
 YC  40      -2  -0      .0          TE  68  SH  ti
 
 OD  60      8-  8-      00             32     ve
 
 ON  51      20  20                  AI      NE    
 
 E-  20      09  09                  D       IL    
 
 AC  1                               PH       C    
 
 ET                                  AR            
 
 AM                                  M             
 
 IN                                  #3            
 
 OP                                91            
 
 HE                         4             
 
 N                                     
 
 5-                              
 
 32                              
 
 5                        
 
                     
 
                     
 
                     
 
                  
 
                 
 
               
 
               
 
               
 
              
 
 OX  00      09  09  00  12  3       RI  79  RU  Ac
 
 YC  40      -0  -2      .0          TE  90  SH  ti
 
 OD  60      9-  4-      00             93     ve
 
 ON  51      20  20                  AI      NE    
 
 E-  20      09  09                  D       IL    
 
 AC  1                               PH       C    
 
 ET                                  AR            
 
 AM                                  M             
 
 IN                                  #3            
 
 OP                                93            
 
 HE                         8             
 
 N                                     
 
 5-                              
 
 32                              
 
 5                        
 
                     
 
                     
 
                     
 
                  
 
                 
 
               
 
               
 
               
 
              
 
 PE  00      09  09  00  40  10      RI  37  RU  Ac
 
 NI  09      -1  -2      .0          TE  49  SH  ti
 
 CI  31      1-  4-      00             20     ve
 
 LL  17      20  20                  AI      NE    
 
 IN  20      09  09                  D       IL    
 
    1                               PH       C    
 
 VK                                  AR            
 
                                    M             
 
 25                                  #3            
 
 0                                  91            
 
 MG                         4             
 
                                      
 
 TA                              
 
 BL                              
 
 ET                       
 
                     
 
                     
 
                     
 
                  
 
                 
 
               
 
               
 
               
 
               
 
 KS  60      09  09  00  30  30      RI  37  WE  Ac
 
 EN  25      -1  -2      .0          TE  49  ST  ti
 
 AT  80      1-  4-      00             19     ve
 
 AL  19      20  20                  AI      RI    
 
    60      09  09                  D       CH    
 
 19  1                               PH      AR    
 
                                    AR      D     
 
 TA                                  M       M     
 
 BL                                  #3            
 
 ET                                 91            
 
                            4             
 
                                       
 
                                 
 
                                 
 
                          
 
                        
 
                       
 
                     
 
                  
 
              
 
     63      08  08  00  14  7       WA  69  GA  Ac
 
     30      -0  -2      .0          L-  83  IN  ti
 
     40      8-  8-      00          MA  14  EY  ve
 
     71      20  20                  RT  3        
 
     00      08  08                         MI    
 
     1                               PH      CH    
 
                                     AR      AE    
 
                                     MA      L     
 
                                     CY      S     
 
                                                
 
                            #5            
 
                            91         
 
                                   
 
                                 
 
                          
 
                        
 
                        
 
                       
 
               
 
               
 
               
 
 CE  00      07  07  00  28  7       CA  64  LIBBY  Ac
 
 PH  09      -0  -1      .0          RL  52  SE  ti
 
 AL  33      3-  7-      00          IS  42     ve
 
 EX  14      20  20                  LE      T.    
 
 IN  70      08  08                              
 
    5                               DR      LO    
 
 50                                  UG      RE    
 
 0                                          NZ    
 
 MG                                  IN      O     
 
                                  C       MD    
 
 CA                                      
 
 PS                                 LIBBY 
 
 UL                           SE 
 
 E                               
 
                          
 
                        
 
                        
 
                        
 
                    
 
                  
 
                  
 
                  
 
              
 
     00      05  06  00  20  5       CL  17  No  Ac
 
     05      -1  -0      .0          IN  09  t   ti
 
     44      9-  5-      00          IC  62  Av  ve
 
     65      20  20                         ai    
 
     02      08  08                  PH      la    
 
     9                               AR      bl    
 
                                     MA      e     
 
                                     CY            
 
                                                   
 
                                                 
 
                                          
 
                                       
 
                                 
 
                                 
 
                          
 
                       
 
                     
 
                  
 
     00      05  05  00  30  7       CL  17  No  Ac
 
     05      -0  -2      .0          IN  02  t   ti
 
     44      8-  2-      00          IC  90  Av  ve
 
     65      20  20                         ai    
 
     02      08  08                  PH      la    
 
     9                               AR      bl    
 
                                     MA      e     
 
                                     CY            
 
                                                   
 
                                                 
 
                                          
 
                                       
 
                                 
 
                                 
 
                          
 
                       
 
                     
 
                  
 
 OX  00      05  05  00  30  4       RI  73  No  Ac
 
 YC  40      -0  -2      .0          TE  20  t   ti
 
 OD  60      5-  2-      00             17  Av  ve
 
 ON  51      20  20                  AI      ai    
 
 E-  20      08  08                  D       la    
 
 AC  1                               PH      bl    
 
 ET                                  AR      e     
 
 AM                                  M             
 
 IN                                  #3            
 
 OP                                93            
 
 HE                         8             
 
 N                                     
 
 5-                              
 
 32                              
 
 5                        
 
                       
 
                     
 
                     
 
                  
 
                 
 
               
 
               
 
               
 
              
 
     00      04  05  00  30  8       CL  16  No  Ac
 
     40      -2  -0      .0          IN  94  t   ti
 
     60      5-  8-      00          IC  74  Av  ve
 
     35      20  20                         ai    
 
     70      08  08                  PH      la    
 
     5                               AR      bl    
 
                                     MA      e     
 
                                     CY            
 
                                                   
 
                                                 
 
                                          
 
                                       
 
                                 
 
                                 
 
                          
 
                       
 
                     
 
                  
 
     00      04  04  00  20  5       CL  16  No  Ac
 
     40      -1  -2      .0          IN  90  t   ti
 
     60      8-  4-      00          IC  64  Av  ve
 
     35      20  20                         ai    
 
     70      08  08                  PH      la    
 
     5                               AR      bl    
 
                                     MA      e     
 
                                     CY            
 
                                                   
 
                                                 
 
                                          
 
                                       
 
                                 
 
                                 
 
                          
 
                       
 
                     
 
                  
 
                                                                                
                                                                                
                                                                                
                                                                                
                                                                                
                                                                                
                                                                                
                                                                                
                                                  
 
Procedures
                      
 
 
 Procedure  DOS        Code       Location   Performer  Comment  
 
                                                                 
 
                                                 
 
 NEEDLE     15798      [facility]            
 
 EMG EA   7                     [facility]           
 
 EXTREMTY                     INC        INC       
 
 W/PARASPI                           
 
 NL AREA              
 
 COMPLETE      
 
               
 
              
 
 NERVE     97580      [facility]            
 
 CONDUCTIO  7                     [facility]           
 
 N STUDIES                    INC        INC       
 
  3-4                            
 
 STUDIES               
 
               
 
             
 
 BLOOD     63245      [facility]            
 
 COUNT   7                     [facility]           
 
 COMPLETE                     INC        INC       
 
 AUTO&AUTO                           
 
  DIFRNTL              
 
 WBC           
 
               
 
         
 
 HEPATITIS    89324      [facility]            
 
  A   7                     [facility]           
 
 ANTIBODY                     INC        INC       
 
 HAAB                                
 
                       
 
          
 
 COMPREHEN    84364      [facility]            
 
 SIVE   7                     [facility]           
 
 METABOLIC                    INC        INC       
 
  PANEL                              
 
                       
 
            
 
 [place]     [address]      [facility]            
 
 THYROID   7                     [facility]           
 
 STIMULATI                    INC        INC       
 
 NG                            
 
 HORMONE              
 
 TSH           
 
               
 
         
 
 ANTINUCLE    13188      [facility]            
 
 AR   7                     [facility]           
 
 ANTIBODIE                    INC        INC       
 
 S GARET[place]                               
 
                       
 
           
 
 [place]     [address]      [facility]            
 
 THYROXINE  7                     [facility]           
 
  TOTAL                       INC        INC       
 
                                     
 
                    
 
 [place]     [address]      [facility]            
 
 BLOOD/URI  7                     [facility]           
 
 C ACID                       INC        INC       
 
                                     
 
                    
 
 RHEUMATOI    66735      [facility]            
 
 D FACTOR   7                     [facility]           
 
 QUANTITAT                    INC        INC       
 
 CLARISSE                                 
 
                       
 
         
 
 HEPATITIS    52056      [facility]            
 
  C   7                     [facility]           
 
 ANTIBODY                     INC        INC       
 
                                     
 
                      
 
 HEPATITIS    97184      [facility]            
 
  B CORE   7                     [facility]           
 
 ANTIBODY                     INC        INC       
 
 HBCAB                            
 
 TOTAL                 
 
               
 
           
 
 [place]     [address]      [facility]            
 
 HEPATITIS  7                     [facility]           
 
  B                     INC        INC       
 
 SURFACE                            
 
 ANTIGEN               
 
               
 
             
 
 SEDIMENTA    35644      [facility]            
 
 TION RATE  7                     [facility]           
 
  RBC                     INC        INC       
 
 NON-AUTOM                           
 
 ATED                  
 
               
 
          
 
 HEMOGLOBI    95951      [facility]            
 
 N   7                     [facility]           
 
 GLYCOSYLA                    INC        INC       
 
 LESLYE A1C                             
 
                       
 
             
 
 SUSCEPTIB    28457      LAB AMEE   LAB AMEE            
 
 LTY STDY   7                     [facility]           
 
 ANTIMICRB                    HOLDINGS   HOLDINGS  
 
 IAL                            
 
 MICRO/AGA                       
 
 R DILUTJ      
 
               
 
              
 
 CUL BACT     89444      [facility]   [facility]            
 
 XCPT   7                     [facility]           
 
 URINE                     HOLDINGS   HOLDINGS  
 
 BLOOD/STO                           
 
 OL                        
 
 AEROBIC      
 
 ISOL          
 
               
 
          
 
 CUL BACT     48853      LAB AMEE   LAB AMEE            
 
 AEROBIC   7                     [facility]           
 
 ADDL                     HOLDINGS   HOLDINGS  
 
 METHS                            
 
 DEFINITIV                       
 
 E EA ISOL     
 
               
 
               
 
 RADEX     19382      [facility] [place]   RADMANESH           
 
 ELBOW   7                     RADIOLOGY                     
 
 COMPLETE                                          
 
 MINIMUM 3                 
 
  VIEWS        
 
               
 
            
 
 IAD[place]     64084      [facility] ALVA            
 
 NEISSER[place]  6                     LUCÍA ZAMORA  GLORY                
 
                                           
 
 GONORRHOE                     
 
 AE DIRECT     
 
  PROBE TQ     
 
               
 
               
 
 IADNA     44158      BIO   BIO            
 
 [place]  6                     REFERNCE   REFERNCE           
 
                      LABORATOR  LABORATOR 
 
 GONORRHOE      IES        IES       
 
 AE                          
 
 AMPLIFIED             
 
  PROBE TQ     
 
               
 
               
 
 IADNA NOS    76623      BIO   BIO            
 
    6                     REFERNCE   REFERNCE           
 
 AMPLIFIED                    LABORATOR  LABORATOR 
 
  PROBE TQ      IES        IES       
 
  EACH                          
 
 ORGANISM              
 
               
 
              
 
 IADNA     64269      BIO   BIO            
 
 CHLAMYD[place]  6                     REFERNCE   REFERNCE           
 
                      LABORATOR  LABORATOR 
 
 TRACHOMAT      IES        IES       
 
 IS                          
 
 AMPLIFIED             
 
  PROBE TQ     
 
               
 
               
 
 IADNA     87408      BIO   BIO            
 
 TRICHOMON  6                     REFERNCE   REFERNCE           
 
 AS                     LABORATOR  LABORATOR 
 
 VAGINALIS      IES        IES       
 
                           
 
 AMPLIFIED             
 
  PROBE      
 
 TECH          
 
               
 
          
 
 CYTP C/V     60718      BIO   BIO            
 
 AUTO THIN  6                     REFERNCE   REFERNCE           
 
  LYR                     LABORATOR  LABORATOR 
 
 PREPJ SCR      IES        IES       
 
  MNL                          
 
 RESCR              
 
 PHYS          
 
               
 
          
 
 CULTURE     09255      [facility] CASTREJON            
 
 CHLAMYDIA  6                     ULCÍA ZAMORA  LUCIANO                
 
  ANY                                          
 
 SOURCE                        
 
               
 
            
 
 PHYSICAL   04-  22952      [facility]            
 
 THERAPY   5                     [facility]           
 
 EVALUATIO                    INC        INC       
 
 N                                   
 
                       
 
       
 
 LEVEL IV     11317      [facility]  [facility]           
 
 SURG   5                      LLC                          
 
 PATHOLOGY                                         
 
              
 
 GROSS&LIDA     
 
 ROSCOPIC      
 
 EXAM          
 
               
 
          
 
 ANESTHESI    95906      [facility]  [facility] GONZAL[facility]           
 
 A   5                      ANESTH                      
 
 INTRAPERI                    OF THE             
 
 TONEAL       BLUE       
 
 LOWER ABD               
 
  W/LAPS           
 
 NOS           
 
               
 
         
 
 SALPINGO-    26531      [facility] CASTREJON            
 
 OOPHORECT  5                     LUCÍA WOLF                
 
 KARLA                                          
 
 COMPL/PRT                     
 
 L UNI/BI      
 
 SPX           
 
               
 
         
 
 OTHER     6562       [facility]            
 
 REMOVAL   5                     [facility]           
 
 OF                       INC        INC       
 
 REMAINING                           
 
  OVARY              
 
 AND TUBE      
 
               
 
              
 
 OTHER     5459       [facility]            
 
 LYSIS OF   5                     [facility]           
 
 PERITONEA                      INC        INC       
 
 L                            
 
 ADHESIONS             
 
               
 
               
 
 SMR PRIM     42606      [facility] ADAMS            
 
 SRC WET   5                     LUCÍA CASTREJON MD          
 
 [facility]                                          
 
 NFCT AGT                            
 
               
 
              
 
 URNLS DIP    51312      [facility]            
 
    5                     [facility]           
 
 STICK/TAB                    INC        INC       
 
 LET                            
 
 REAGENT              
 
 AUTO      
 
 MICROSCOP     
 
 Y             
 
               
 
       
 
 US     29226      [facility]            
 
 TRANSVAGI  5                     [facility]           
 
 NAL                          INC        INC       
 
                                     
 
                 
 
 BLOOD     19651      [facility]            
 
 COUNT   5                     [facility]           
 
 COMPLETE                     INC        INC       
 
 AUTO&AUTO                           
 
  DIFRNTL              
 
 WBC           
 
               
 
         
 
 [place]     28445      [facility]            
 
 TRANSVAGI  5                     [facility]           
 
 NAL                          INC        INC       
 
                                     
 
                 
 
 THERAPEUT    56294      [facility] CASTREJON            
 
 IC   5                     [facility] WOLF                
 
 PROPHYLAC                                         
 
 TIC/DX                      
 
 INJECTION     
 
  SUBQ/IM      
 
               
 
              
 
 INJECTION          [facility] CASTREJON            
 
    5                     [facility] WOL[facility]                
 
 PROGESTER                                         
 
 ONE PER                      
 
 50 MG         
 
               
 
           
 
 BLOOD     92390      [facility]            
 
 COUNT   5                     [facility]           
 
 COMPLETE                     INC        INC       
 
 AUTO&AUTO                           
 
  DIFRNTL              
 
 WBC           
 
               
 
         
 
 COLLECTIO    43598      [facility]            
 
 N VENOUS   5                     [facility]           
 
 BLOOD                     INC        INC       
 
 VENIPUNCT                           
 
 URE                   
 
               
 
         
 
 US PELVIC    07711      [facility]            
 
    5                     [facility]           
 
 NONOBSTET                    INC        INC       
 
 [place]                            
 
 REAL-TIME             
 
  IMAGE      
 
 COMPLETE      
 
               
 
              
 
 HOSPITAL           [facility]            
 
 OBSERVATI  5                     [facility]           
 
 ON                     INC        INC       
 
 SERVICE                            
 
 PER HOUR              
 
               
 
              
 
 BLOOD   02-  42863      [facility]            
 
 COUNT   5                     [facility]           
 
 HEMATOCRI                    INC        INC       
 
 T                                   
 
                       
 
       
 
 HOSPITAL   02-        [facility]            
 
 OBSERVATI  5                     [facility]           
 
 ON                     INC        INC       
 
 SERVICE                            
 
 PER HOUR              
 
               
 
              
 
 THERAPEUT  02-  77650      [facility]            
 
 IC   5                     [facility]           
 
 PROPHYLAC                    INC        INC       
 
 TIC/DX                            
 
 INJECTION             
 
  SUBQ/IM      
 
               
 
              
 
 INJECTION  02-        [facility]            
 
    5                     [facility]           
 
 ONDANSETR                    INC        INC       
 
 ON HCL                            
 
 PER 1 MG              
 
               
 
              
 
 IV   02-  72521      [facility]            
 
 INFUSION   5                     [facility]           
 
 THERAPY/P                    INC        INC       
 
 ROPHYLAXI                           
 
 S /DX 1ST             
 
  TO 1 HR      
 
               
 
              
 
 THERAPEUT  02-  84222      [facility]            
 
 IC   5                     [facility]           
 
 INJECTION                    INC        INC       
 
  IV PUSH                            
 
 EACH NEW              
 
 DRUG          
 
               
 
          
 
 INJECTION  02-        [facility]            
 
    5                     [facility]           
 
 ACETAMINO                    INC        INC       
 
 PHEN 10                            
 
 MG                    
 
               
 
        
 
 LEVEL V   02-  24131      [facility]  [facility]            
 
 SURG   5                      LLC       [place]                
 
 PATHOLOGY                                         
 
                  
 
 GROSS&LIDA     
 
 ROSCOPIC      
 
 EXAM          
 
               
 
          
 
 COLLECTIO  02-  49250      [facility]            
 
 N VENOUS   5                     [facility]           
 
 BLOOD                     INC        INC       
 
 VENIPUNCT                           
 
 URE                   
 
               
 
         
 
 VAGINAL   02-  05431      [facility] VAZQUEZON            
 
 HYSTERECT  5                     [facility]           
 
 KARLA                     INC        INC       
 
 UTERUS                            
 
 250 GM/<              
 
               
 
              
 
 UNLISTED   02-  90745      [facility]            
 
 PX FEMALE  5                     [facility]           
 
  GENITAL                     INC        INC       
 
 SYSTEM                            
 
 NONOBSTET             
 
 RICAL         
 
               
 
           
 
 BLOOD   02-  31108      [facility]   [facility]            
 
 COUNT   5                     [facility]           
 
 HEMOGLOBI                    INC        INC       
 
 N                                   
 
                       
 
       
 
 ANESTHESI  02-  48487      [facility] VAGINAL  5                      ANESTH   SHE                
 
                      OF THE             
 
 HYSTERECT      BLUE           
 
 KARLA INCL                
 
 BIOPSY             
 
               
 
            
 
 URNLS DIP  02-  21741      [facility]            
 
    5                     [facility]           
 
 STICK/TAB                    INC        INC       
 
 LET                            
 
 REAGENT              
 
 AUTO      
 
 MICROSCOP     
 
 Y             
 
               
 
       
 
 URNLS DIP    28995      [facility]            
 
    5                     [facility]           
 
 STICK/TAB                    INC        INC       
 
 LET                            
 
 REAGENT              
 
 AUTO      
 
 MICROSCOP     
 
 Y             
 
               
 
       
 
 URINE     95281      [facility]            
 
 PREGNANCY  5                     [facility]           
 
  TEST                     INC        INC       
 
 VISUAL                            
 
 COLOR              
 
 CMPRSN      
 
 METHS         
 
               
 
           
 
 COLLECTIO    72109      [facility]            
 
 N VENOUS   5                     [facility]           
 
 BLOOD                     INC        INC       
 
 VENIPUNCT                           
 
 URE                   
 
               
 
         
 
 BLOOD     22073      [facility] NUNEZ            
 
 COUNT   5                     [facility]           
 
 COMPLETE                     INC        INC       
 
 AUTO&AUTO                           
 
  DIFRNTL              
 
 WBC           
 
               
 
         
 
 SMR PRIM     40849      KARLOS CASTREJON            
 
 SRC WET   5                     LUCÍA RODRIGES                                          
 
 NFCT AGT                      
 
               
 
              
 
 RADIOLOGI    63557      [facility] [place]   SCALF TANIA           
 
 C EXAM   5                     RADIOLOGY                     
 
 CHEST 2                                          
 
 VIEWS                  
 
 FRONTAL&L     
 
 ATERAL        
 
               
 
            
 
 COLLECTIO    61746      [facility] NUNEZ            
 
 N VENOUS   5                     [facility]           
 
 BLOOD                     INC        INC       
 
 VENIPUNCT                           
 
 URE                   
 
               
 
         
 
 IMMUNOASS    21157      ENRIQUE NUNEZ            
 
 AY TUMOR   5                     [facility]           
 
 ANTIGEN                     INC        INC       
 
 QUANTITAT                           
 
 CLARISSE                   
 
               
 
         
 
 US   01-  18810      ENRIQUE NUNEZ            
 
 TRANSVAGI  5                     [facility]           
 
 NAL                          INC        INC       
 
                                     
 
                 
 
 HANDLG&/O    39454      KARLOS ROSASPEL            
 
 R CONVEY   5                     LUCÍA ZAMORA  LUCIANO                
 
 OF SPEC                                          
 
 FOR TR                      
 
 OFFICE TO     
 
  LAB          
 
               
 
          
 
 URINLS     13609      KARLOS CASTREJON            
 
 DIP   5                     LUCÍA WOLF                
 
 STICK/TAB                                         
 
 LET                      
 
 REAGNT      
 
 NON-AUTO      
 
 MICRSCPY      
 
               
 
              
 
 CULTURE     08327      KARLOS CASTREJON            
 
 CHLAMYDIA  5                     LUCÍA WOLF                
 
  ANY                                          
 
 SOURCE                        
 
               
 
            
 
 IADNA     49569      KARLOS CASTREJON            
 
 NEISSERIA  5                     LUCÍA WOLF                
 
                                           
 
 GONORRHOE                     
 
 AE DIRECT     
 
  PROBE TQ     
 
               
 
               
 
 LOCM           [facility] NUNEZ            
 
 [phone]   4                     [facility]           
 
 MG/ML                     INC        INC       
 
 IODINE                            
 
 CONCENTRA             
 
 TION PER      
 
 ML            
 
               
 
        
 
 CT THORAX    17576      EN[facility] NUNEZ            
 
    4                     [facility]           
 
 W/CONTRAS                    INC        INC       
 
 T                            
 
 MATERIAL              
 
               
 
              
 
 RADIOLOGI    07215      ENRIQUE NUNEZ            
 
 C EXAM   4                     [facility]           
 
 CHEST 2                     INC        INC       
 
 VIEWS                            
 
 FRONTAL&L             
 
 ATERAL        
 
               
 
            
 
 ECG     46219      [facility]            
 
 ROUTINE   4                     [facility]   [place]                
 
 ECG                     EMERGENCY            
 
 W/LEAST        PHYSI         
 
 12 [facility]                
 
 I&R [facility]           ENRIQUE NUNEZ            
 
 OUTPATIEN  4                     [facility]           
 
 T CLIN                     INC        INC       
 
 VISIT                            
 
 ASSESS &              
 
 MGMT PT       
 
               
 
             
 
 IV   10-  56557      ENRIQUE NUNEZ            
 
 INFUSION   4                     [facility]           
 
 THERAPY/P                    INC        INC       
 
 ROPHYLAXI                           
 
 S /DX 1ST             
 
  TO 1 HR      
 
               
 
              
 
 IV   10-  63049      ENRIQUE NUNEZ            
 
 INFUSION   4                     [facility]           
 
 THERAPY/P                    INC        INC       
 
 ROPHYLAXI                           
 
 S /DX 1ST             
 
  TO 1 HR      
 
               
 
              
 
 DRUG   10-  10232      EN[facility]QUE NUNEZ            
 
 SCREEN   4                     [facility]           
 
 QUANTITAT                    INC        INC       
 
 CLARISSE                            
 
 VANCOMYCI             
 
 N             
 
               
 
       
 
 CREATININ  10-  18195      ENRIQUE NUNEZ            
 
 E BLOOD    4                     [facility]           
 
                              INC        INC       
 
                                   
 
             
 
 IV   10-  11300      ENRIQUE NUNEZ            
 
 INFUSION   4                     [facility]           
 
 THERAPY/P                    INC        INC       
 
 ROPHYLAXI                           
 
 S /DX 1ST             
 
  TO 1 HR      
 
               
 
              
 
 INSJ PRPH  10-  80346      ENRIQUE NUNEZ            
 
  CVC W/O   4                     [facility]           
 
 SUBQ                     INC        INC       
 
 PORT/                            
 
 AGE 3              
 
 YR/>          
 
               
 
          
 
 INCISION   10-  26798      [facility]   CLEMENTE            
 
 &   4                     PHYSICIAN  [place]                
 
 DRAINAGE                     S GROUP              
 
 ABSCESS                      
 
 SIMPLE/SI             
 
 NGLE          
 
               
 
          
 
 IV   10-  28049      ENRIQUE NUNEZ            
 
 INFUSION   4                     [facility]           
 
 THERAPY/P                    INC        INC       
 
 ROPHYLAXI                           
 
 S /DX 1ST             
 
  TO 1 HR      
 
               
 
              
 
 RADIOLOGI  10-  61123      ENRIQUE NASH            
 
 C EXAM   4                     [facility]   JR GABRIELLA             
 
 CHEST 2                     INC                  
 
 VIEWS                         
 
 FRONTAL&L         
 
 ATERAL        
 
               
 
            
 
 IV   10-  71325      ENRIQUE NUNEZ            
 
 INFUSION   4                     [facility]           
 
 THERAPY                     INC        INC       
 
 PROPHYLAX                           
 
 IS/DX EA              
 
 HOUR          
 
               
 
          
 
 CATHETER   10-        ENRIQUE NUNEZ            
 
 INFUS   4                     [facility]           
 
 INSRT                     INC        INC       
 
 PERIPHERA                           
 
 LLY              
 
 CNTRLLY/M     
 
 IDLN          
 
               
 
          
 
 IV   10-  78269      ENRIQUE NUNEZ            
 
 INFUSION   4                     [facility]           
 
 THERAPY/P                    INC        INC       
 
 ROPHYLAXI                           
 
 S /DX 1ST             
 
  TO 1 HR      
 
               
 
              
 
 IV   10-  40255      ENRIQUE NUNEZ            
 
 INFUSION   4                     [facility]           
 
 THERAPY/P                    INC        INC       
 
 ROPHYLAXI                           
 
 S /DX 1ST             
 
  TO 1 HR      
 
               
 
              
 
 IV   10-  01779      ENRIQUE NUNEZ            
 
 INFUSION   4                     [facility]           
 
 THERAPY                     INC        INC       
 
 PROPHYLAX                           
 
 IS/DX EA              
 
 HOUR          
 
               
 
          
 
 DRUG   10-  38793      ENRIQUE NUNEZ            
 
 SCREEN   4                     [facility]           
 
 QUANTITAT                    INC        INC       
 
 CLARISSE                            
 
 VANCOMYCI             
 
 N             
 
               
 
       
 
 IV   10-  80327      ENRIQUE NUNEZ            
 
 INFUSION   4                     [facility]           
 
 THERAPY                     INC        INC       
 
 PROPHYLAX                           
 
 IS/DX EA              
 
 HOUR          
 
               
 
          
 
 IV   10-  67793      ENRIQUE NUNEZ            
 
 INFUSION   4                     [facility]           
 
 THERAPY/P                    INC        INC       
 
 ROPHYLAXI                           
 
 S /DX 1ST             
 
  TO 1 HR      
 
               
 
              
 
 IV   10-  15822      ENRIQUE NUNEZ            
 
 INFUSION   4                     [facility]           
 
 THERAPY/P                    INC        INC       
 
 ROPHYLAXI                           
 
 S /DX 1ST             
 
  TO 1 HR      
 
               
 
              
 
 IV   10-  63533      ENRIQUE VAZQUEZON            
 
 INFUSION   4                     [facility]           
 
 THERAPY                     INC        INC       
 
 PROPHYLAX                           
 
 IS/DX EA              
 
 HOUR          
 
               
 
          
 
 [place]   10-  28227      ENRIQUE NUNEZ            
 
 UREA   4                     [facility]           
 
 NITROGEN                     INC        INC       
 
 QUANTITAT                           
 
 CLARISSE                   
 
               
 
         
 
 CREATININ  10-  80037      ENRIQUE NUNEZ            
 
 E BLOOD    4                     [facility]           
 
                              INC        INC       
 
                                   
 
             
 
 SUSCEPTIB  10-  98068      ENRIQUE NUNEZ            
 
 LTY STDY   4                     [facility]           
 
 ANTIMICRB                    INC        INC       
 
 IAL                            
 
 MICRO/AGA             
 
 R DILUTJ      
 
               
 
              
 
 CUL BACT   10-  87375      ENRIQUE NUNEZ            
 
 AEROBIC   4                     [facility]           
 
 ADDL                     INC        INC       
 
 METHS                            
 
 DEFINITIV             
 
 E EA ISOL     
 
               
 
               
 
 [place]     [address]      ENRIQUE NUNEZ            
 
 THYROXINE  4                     [facility]           
 
  TOTAL                       INC        INC       
 
                                     
 
                    
 
 GENERAL     95591      ENRIQUE NUNEZ            
 
 HEALTH   4                     [facility]           
 
 PANEL                        INC        INC       
 
                                     
 
                   
 
 HEPATITIS    68032      ENRIQUE NUNEZ            
 
  C   4                     [facility]           
 
 ANTIBODY                     INC        INC       
 
                                     
 
                      
 
 HEPATITIS    68864      ENRIQUE NUNEZ            
 
  B CORE   4                     [facility]           
 
 ANTIBODY                     INC        INC       
 
 HBCAB                            
 
 TOTAL                 
 
               
 
           
 
 HEPATITIS    08547      ENRIQUE NATHAN SURF   4                     [facility]           
 
 ANTIBODY                     INC        INC       
 
 HBSAB                               
 
                       
 
           
 
 CYANOCOBA    94883      ENRIQUE NUNEZ            
 
 CHELSEA   4                     [facility]           
 
 VITAMIN                     INC        INC       
 
 B-12                                
 
                       
 
          
 
 [place]     19216      ENRIQUE NUNEZ            
 
 HEPATITIS  4                     [facility]           
 
  B                     INC        INC       
 
 SURFACE                            
 
 ANTIGEN               
 
               
 
             
 
 LIPID     46678      ENRIQUE NUNEZ            
 
 PANEL      4                     [facility]           
 
                              INC        INC       
 
                                 
 
             
 
 HEMOGLOBI    36671      ENRIQUE NUNEZ            
 
 N   4                     [facility]           
 
 GLYCOSYLA                    INC        INC       
 
 LESLYE A1C                             
 
                       
 
             
 
 [place]     [address]      ENRIQUE NUNEZ            
 
 VITAMIN A  4                     [facility]           
 
                              INC        INC       
 
                                     
 
             
 
 HEPATITIS    79456      ENRIQUE NUNEZ            
 
  A   4                     [facility]           
 
 ANTIBODY                     INC        INC       
 
 HAAB                                
 
                       
 
          
 
 RADEX     34991      ENRIQUE NUNEZ            
 
 SPINE   4                     [facility]           
 
 LUMBOSACR                    INC        INC       
 
 AL                            
 
 MINIMUM 4             
 
  VIEWS        
 
               
 
            
 
 OPHTH     01271      PATI KRUEGER,           
 
 MEDICAL   0                      SHAHLA GALE           
 
 XM&EVAL                     JORGE ROGERS                            
 
 NEW PT          
 
 1/> VST       
 
               
 
             
 
 DETERMINA    19427      PATI KRUEGER,           
 
 TION   0                      SHAHLA GALE           
 
 REFRACTIV                    W          W         
 
 E STATE                             
 
                   
 
             
 
 COLLECTIO    78522      CHUY VERA            
 
 N VENOUS   0                     [facility] 
 
 VENIPUNCT                           
 
 URE                               
 
               
 
         
 
 LIPID     [id number]      CHUY VERA            
 
 PANEL      0                     [facility] 
 
                                 
 
                         
 
 SERVICES     65835      [facility]            
 
 [facility]   9                     FRED L[place]          
 
 OFFICE                                          
 
 OT/N                            
 
 REG SCHED     
 
  HOURS        
 
               
 
            
 
 COLLECTIO    28466      CHUY VERA            
 
 N VENOUS   9                     [facility] 
 
 VENIPUNCT                           
 
 URE                               
 
               
 
         
 
 LIPID     [id number]      [facility]      9                     [facility] 
 
                                 
 
                         
 
 GENERAL     [id number]      Health system   9                     [facility]   HOSPITAL 
 
                                     
 
                               
 
 IV NFS     21422      ENRIQUE NUNEZ            
 
 THER   8                     [facility]           
 
 PROPH/DX                     INC        INC       
 
 1ST >1 HR                           
 
                       
 
               
 
 CULTURE     42179      ENRIQUE NUNEZ            
 
 BACTERIAL  8                     [facility]           
 
                      INC        INC       
 
 QUANTTATI                           
 
 VE COLONY             
 
  COUNT      
 
 URINE         
 
               
 
           
 
 [place]     [address]      ENRIQUE NUNEZ            
 
 LIPASE     8                     [facility]           
 
                              INC        INC       
 
                                  
 
             
 
 URNLS DIP    59275      ENRIQUE   ENRIQUE            
 
    8                     [facility]           
 
 STICK/TAB                    INC        INC       
 
 LET                            
 
 REAGENT              
 
 AUTO      
 
 MICROSCOP     
 
 Y             
 
               
 
       
 
 URINE     98445      ENRIQUE NUNEZ            
 
 PREGNANCY  8                     [facility]           
 
  TEST                     INC        INC       
 
 VISUAL                            
 
 COLOR              
 
 CMPRSN      
 
 METHS         
 
               
 
           
 
 COMPREHEN    43980      ENRIQUE NUNEZ            
 
 SIVE   8                     [facility]           
 
 METABOLIC                    INC        INC       
 
  PANEL                              
 
                       
 
            
 
 BLOOD     81536      ENRIQUE NUNEZ            
 
 COUNT   8                     [facility]           
 
 COMPLETE                     INC        INC       
 
 AUTO&AUTO                           
 
  DIFRNTL              
 
 WBC           
 
               
 
         
 
 [place]     [address]      ENRIQUE NUNEZ            
 
 AMYLASE    8                     [facility]           
 
                              INC        INC       
 
                                   
 
             
 
 RADEX     33849      [facility]   8                        FRED S          
 
 MINIMUM 3                    RADIOLOGY            
 
  VIEWS                      
 
              ASSOCIATE  
 
           S PSC      
 
               
 
           
 
 COMPREHEN    96356      ENRIQUE NUNEZ            
 
 SIVE   8                     [facility]           
 
 METABOLIC                    INC        INC       
 
  PANEL                              
 
                       
 
            
 
 [place]     [address]      ENRIQUE NUNEZ            
 
 AMYLASE    8                     [facility]           
 
                              INC        INC       
 
                                   
 
             
 
 URNLS DIP    16334      ENRIQUE   ENRIQUE            
 
    8                     [facility]           
 
 STICK/TAB                    INC        INC       
 
 LET                            
 
 REAGENT              
 
 AUTO      
 
 MICROSCOP     
 
 Y             
 
               
 
       
 
 [place]     [address]      ENRIQUE NUNEZ            
 
 LIPASE     8                     [facility]           
 
                              INC        INC       
 
                                  
 
             
 
 BLOOD     36744      ENRIQUEDIANELYS NUNEZ            
 
 COUNT   8                     [facility]           
 
 COMPLETE                     INC        INC       
 
 AUTO&AUTO                           
 
  DIFRNTL              
 
 WBC           
 
               
 
         
 
 URINE     74367      ENRIQUE NUNEZ            
 
 PREGNANCY  8                     [facility]           
 
  TEST                     INC        INC       
 
 VISUAL                            
 
 COLOR              
 
 CMPRSN      
 
 METHS         
 
               
 
           
 
 RADEX ABD    22636      ENRIQUE NUNEZ            
 
  COMPL   8                     [facility]           
 
 AQT ABD                     INC        INC       
 
 W/S/E/D                            
 
 VIEWS 1              
 
 VIEW CH       
 
               
 
             
 
 CHOLECYST    44165      [facility]   [facility]            
 
 ECTOMY     8                     [facility] J[facility]ANN,           
 
                              JEAN-PAUL WINSLOW 
 
                                  
 
                         
 
 INITIAL     19097      [facility]   [facility]            
 
 INPATIENT  8                     [facility] J[facility]ANN,           
 
  CONSULT                     JEAN-PAUL WINSLOW 
 
 NEW/ESTAB                           
 
  PT 80                          
 
 MIN           
 
               
 
         
 
 ANESTHESI    32589      [facility]  RAN CALHOUN   8                      ANESTH   YUSUF A             
 
 INTRAPERI                    OF THE             
 
 TONEAL       [place]         
 
 LOWER ABD               
 
  W/LAPS                
 
 NOS           
 
               
 
         
 
 LEVEL III    74027      PATHOLOGY  PATHOLOGY           
 
  SURG   8                      &    &           
 
 PATHOLOGY                    CYTOLOGY   CYTOLOGY  
 
        LAB        LAB       
 
 GROSS&L[facility]                         
 
 ROSCOPIC              
 
 EXAM          
 
               
 
          
 
 LEVEL IV     32058      PATHOLOGY  PATHOLOGY           
 
 SURG   8                      &    &           
 
 PATHOLOGY                    CYTOLOGY   CYTOLOGY  
 
        LAB        LAB       
 
 [facility]OSS[facility]                         
 
 ROSCOPIC              
 
 EXAM          
 
               
 
          
 
 HYSTEROSC    6812       [facility] NUNEZ            
 
 OPY        8                     [facility]           
 
                             INC        INC       
 
                               
 
             
 
 OTHER     6829       EN[facility]QUE NUNEZ            
 
 EXCISION   8                     [facility]           
 
 OR                    INC        INC       
 
 DESTRUCTI                           
 
 ON LESION             
 
  UTERUS       
 
               
 
             
 
 OTHER     6909       ENRIQUE NUNEZ            
 
 DILATION   8                     [facility]           
 
 AND                    INC        INC       
 
 CURETTAGE                           
 
  OF              
 
 UTERUS        
 
               
 
            
 
 CHOLECYST    5122       ENRIQUE NUNEZ            
 
 ECTOMY     8                     [facility]           
 
                             INC        INC       
 
                                  
 
             
 
 BLOOD     74952      ENRIQUE NUNEZ            
 
 COUNT   8                     [facility]           
 
 COMPLETE                     INC        INC       
 
 AUTO&AUTO                           
 
  DIFRNTL              
 
 WBC           
 
               
 
         
 
 URNLS DIP    86467      ENRIQUE NUNEZ            
 
    8                     [facility]           
 
 STICK/TAB                    INC        INC       
 
 LET                            
 
 REAGENT              
 
 AUTO      
 
 MICROSCOP     
 
 Y             
 
               
 
       
 
 [place]     [address]      [place]   [place]            
 
 TRANSVAGI  8                     MEDICAL   [place] P           
 
 NAL                          IMAGING             
 
                ASSOCIATE           
 
        S          
 
               
 
       
 
 IADNA     88924      [facility]  [place],            
 
 CHLAMYDIA  8                      [place]    XIAO E            
 
                                           
 
 TRACHOMAT                       
 
 IS      
 
 AMPLIFIED     
 
  PROBE TQ     
 
               
 
               
 
 CYTP     61524      [facility]  [place],            
 
 CERV/VAG   8                      [place] [facility]    XIAO E            
 
 AUTO THIN                                         
 
  LAYER                        
 
 PREP MNL      
 
 SCREEN        
 
               
 
            
 
 IAD[place]     75082      [employer]  [place],            
 
 [place]  8                      [place] [employer]    [place] E            
 
                                           
 
 GONORRHOE                       
 
 AE      
 
 AMPLIFIED     
 
  PROBE TQ     
 
               
 
               
 
                                                                                
                                                                                
                                                                                
                                                                                
                                                                                
                                                                                
                                                                                
                                                                                
                                                                                
                                                                                
                                                                                
                                                                                
                                                                                
                                                                                
                                                                                
                                                                                
                                                                                
                                                                                
                                                                                
                                       
 
Encounters
                      
 
 
 Encounter  Start   End Date   Code       Location   Performer
 
  Type      Date                                                 
 
                                                        
 
                
 
 OFFICE     21087      [facility]   [facility]   
 
 OUTPATIEN  7          7                     PHYSICIAN           
 
 T VISIT                                S GROUP        
 
 10                    
 
 MINUTES               
 
               
 
             
 
 HOSPITAL                [place]            
 
 -   7          7                     [facility]            
 
 OUTPATIEN                                   INC                 
 
 T                                             
 
                   
 
       
 
 OFFICE     88134      [facility]   [facility]   
 
 OUTPATIEN  7          7                     PHYSICIAN           
 
 T NEW 30                                S GROUP        
 
 MINUTES                     
 
                       
 
             
 
 HOSPITAL                [place]            
 
 -   7          7                     [facility]            
 
 OUTPATIEN                                   INC                 
 
 T                                             
 
                   
 
       
 
 OFFICE     34884      [facility]   [place]   
 
 OUTPATIEN  7          7                     PHYSICIAN           
 
 T VISIT                                S GROUP        
 
 25                    
 
 MINUTES               
 
               
 
             
 
 OFFICE     38073      [facility]   
 
 OUTPATIEN  7          7                     HEALTH            
 
 T VISIT                                SOLUTIONS      
 
 15           IN       
 
 MINUTES                 
 
                   
 
             
 
 OFFICE     41939      [facility]   
 
 OUTPATIEN  7          7                     HEALTH            
 
 T VISIT                                SOLUTIONS      
 
 15           IN       
 
 MINUTES                 
 
                   
 
             
 
 OFFICE     59074      [facility]   
 
 OUTPATIEN  7          7                     HEALTH            
 
 T VISIT                                SOLUTIONS      
 
 15           IN       
 
 MINUTES                 
 
                   
 
             
 
 EMERGENCY    72486      [facility]  [facility] JR
 
    7          7                     [facility]            
 
 DEPARTMEN                               EMERGENCY         
 
 T VISIT           [facility]    
 
 MODERATE                
 
 SEVERITY             
 
               
 
              
 
 [facility]                [place]            
 
 -   7          7                     [facility]           
 
 T                                             
 
                         
 
       
 
 OFFICE     55736      [facility]   
 
 [facility]                     [facility]            
 
 T VISIT                                SOLUTIONS      
 
 15           IN       
 
 MINUTES                 
 
                   
 
             
 
 OFFICE     19700      [facility] 
 
 OUTPATIEN  6          6                     [facility]      
 
 T VISIT                                URGENT            
 
 25          TREAT       
 
 MINUTES                 
 
                     
 
             
 
 OFFICE     85615      [facility] 
 
 [facility]  [facility]          6                     [facility]      
 
 T VISIT                                URGENT            
 
 25          TREAT       
 
 MINUTES                 
 
                     
 
             
 
 PERIODIC     71877      [facility] 
 
 PREVENTIV  6          6                     [facility]  [place]      
 
 E MED EST                                                   
 
  PATIENT                      
 
 18-39 YRS     
 
               
 
               
 
 OFFICE     94660      [facility]   [place] 
 
 OUTPATIEN  5          5                     PHYSICIAN  [place]      
 
 T VISIT                                S GROUP             
 
 15                      
 
 MINUTES               
 
               
 
             
 
 OFFICE   10-  10-  22076      [facility]   [place] 
 
 OUTPATIEN  5          5                     PH[facility]ICIAN  [place]      
 
 T VISIT                                S GROUP             
 
 15                      
 
 MINUTES               
 
               
 
             
 
 OFFICE     92998      [facility]   [place] 
 
 OUTPATIEN  5          5                     [facility]ICIAN  [place]      
 
 T VISIT                                S GROUP             
 
 10                      
 
 MINUTES               
 
               
 
             
 
 HOSPITAL   04-  04-             [facility]            
 
 -   5          5                     [facility]            
 
 OUTPATIEN                                   INC                 
 
 T                                             
 
                   
 
       
 
 HOSPITAL   03-  03-             [facility]            
 
 -   5          5                     [facility]            
 
 INPATIENT                                   INC                 
 
                                               
 
                   
 
 HOSPITAL                [facility]            
 
 -   5          5                     [facility]            
 
 OUTPATIEN                                   INC                 
 
 T                                             
 
                   
 
       
 
 OFFICE     75730      [facility] 
 
 OUTPATIEN  5          5                     [facility]  [place]      
 
 T VISIT                                                    
 
 25                      
 
 MINUTES       
 
               
 
             
 
 HOSPITAL                [place]            
 
 -   5          5                     [facility]            
 
 OUTPATIEN                                   INC                 
 
 T                                             
 
                   
 
       
 
 OFFICE     46127      [facility] 
 
 OUTPATIEN  5          5                     [facility]      
 
 T VISIT                                                    
 
 25                      
 
 MINUTES       
 
               
 
             
 
 HOSPITAL                [facility]            
 
 -   5          5                     [facility]            
 
 OUTPATIEN                                   INC                 
 
                                              
 
                   
 
       
 
 OFFICE     13012      [facility] 
 
 OUTPATIEN  5          5                     [facility]      
 
 T VISIT                                                    
 
 15                      
 
 MINUTES       
 
               
 
             
 
 HOSPITAL   02-  02-             [facility]            
 
 -   5          5                     [facility]            
 
 OUTPATIEN                                   INC                 
 
 T                                             
 
                   
 
       
 
 HOSPITAL                [facility]            
 
 -   5          5                     [facility]            
 
 OUTPATIEN                                   INC                 
 
 T                                             
 
                   
 
       
 
 OFFICE     24624      [facility] 
 
 OUTPATIEN  5          5                     [facility]      
 
 T VISIT                                                    
 
 15                      
 
 MINUTES       
 
               
 
             
 
 OFFICE     23461      [facility] 
 
 OUTPATIEN  5          5                     PHYSICIAN  [place]      
 
 T VISIT                                S GROUP             
 
 10                      
 
 MINUTES               
 
               
 
             
 
 OFFICE     08108      [facility] 
 
 OUTPATIEN  5          5                     [facility]      
 
 T VISIT                                                    
 
 15                      
 
 MINUTES       
 
               
 
             
 
 HOSPITAL                [facility]            
 
 -   5          5                     [facility]            
 
 OUTPATIEN                                   INC                 
 
 T                                             
 
                   
 
       
 
 HOSPITAL   01-  01-             [facility]            
 
 -   5          5                     [facility]            
 
 OUTPATIEN                                   INC                 
 
 T                                             
 
                   
 
       
 
 INITIAL     27125      [facility] 
 
 PREVENTIV  5          5                     [facility]      
 
 E                                                    
 
 MEDICINE                      
 
 NEW PT      
 
 A[facility]      
 
 [facility]            
 
 -   4          4                     [facility]            
 
 OUTPATIEN                                   [facility]                [facility]            
 
 -   4          4                     [facility]            
 
 OUTPATIEN                                   INC                 
 
 T                                             
 
                   
 
       
 
 OFFICE     90370      [facility] 
 
 OUTPATIEN  4          4                     PHYSICIAN  [place]      
 
 T VISIT                                S GROUP             
 
 10                      
 
 MINUTES               
 
               
 
             
 
 EMERGENCY    40227      [facility] 
 
  DEPT   4          4                     [facility]   [place]      
 
 VISIT                                EMERGENCY           
 
 HIGH           PHYSI      
 
 SEVERITY&               
 
 THREAT             
 
 [facility]                [facility]            
 
 -   4          4                     [facility]            
 
 OUTPATIEN                                   [facility]   10-  10-             [facility]            
 
 -   4          4                     [facility]            
 
 OUTPATIEN                                   [facility]   10-  10-             [facility]            
 
 -   4          4                     [facility]            
 
 OUTPATIEN                                   [facility]   10-  10-             [facility]            
 
 -   4          4                     [facility]            
 
 OUTPATIEN                                   INC                 
 
 T                                             
 
                   
 
       
 
 HOSPITAL   10-  10-             [facility]            
 
 -   4          4                     [facility]            
 
 OUTPATIEN                                   INC                 
 
 T                                             
 
                   
 
       
 
 HOSPITAL   10-  10-             [facility]            
 
 -   4          4                     [facility]            
 
 OUTPATIEN                                   INC                 
 
 T                                             
 
                   
 
       
 
 HOSPITAL   10-  10-             [facility]            
 
 -   4          4                     [facility]            
 
 OUTPATIEN                                   INC                 
 
 T                                             
 
                   
 
       
 
 HOSPITAL   10-  10-             [facility]            
 
 -   4          4                     [facility]            
 
 OUTPATIEN                                   INC                 
 
 T                                             
 
                   
 
       
 
 HOSPITAL   10-  10-             [facility]            
 
 -   4          4                     [facility]            
 
 OUTPATIEN                                   INC                 
 
 T                                             
 
                   
 
       
 
 HOSPITAL   10-  10-             [facility]            
 
 -   4          4                     [facility]            
 
 OUTPATIEN                                   INC                 
 
 T                                             
 
                   
 
       
 
 EMERGENCY  10-  10-  80899      [facility] 
 
  DEPT   4          4                     [facility]   [place]      
 
 VISIT                                EMERGENCY           
 
 HIGH           PHYSI      
 
 SEVERITY&               
 
 THREAT             
 
 FUN         
 
               
 
           
 
 OFFICE   10-  10-  32515      [facility] 
 
 OUTPATIEN  4          4                     PHYSICIAN  [place]      
 
 T VISIT                                S GROUP             
 
 10                      
 
 MINUTES               
 
               
 
             
 
 OFFICE     47677      [facility] 
 
 OUTPATIEN  4          4                     PHYSICIAN  [place]      
 
 T VISIT                                S GROUP             
 
 15                      
 
 MINUTES               
 
               
 
             
 
 [facility]     97250      [facility] 
 
 [facility]  4          4                     [facility]  [place]      
 
 T VISIT                                S GROUP             
 
 10                      
 
 MINUTES               
 
               
 
             
 
 HOSPITAL                [place]            
 
 -   4          4                     [facility]            
 
 OUTPATIEN                                   INC                 
 
 T                                             
 
                   
 
       
 
 EMERGENCY    24653      [facility] 
 
    4          4                     [facility]   PAT      
 
 DEPARTMEN                               EMERGENCY           
 
 T VISIT           PHYSI      
 
 MODERATE                
 
 SEVERITY             
 
               
 
              
 
 OFFICE     31467      [facility]  0          0                     FRED M  FRED M
 
 T VISIT                                                    
 
 15                            
 
 MINUTES       
 
               
 
             
 
 [facility]     92085      [facility]                     [facility]     ALEX B   
 
 T NEW 30                                                    
 
 MINUTES                       
 
               
 
             
 
 EMERGENCY  04-  04-  51327      [facility]  [place], 
 
    0          0                     [facility]
 
 DEPARTMEN                               EMERGENCY           
 
 T VISIT           [facility] INC         
 
 HIGH/URGE               
 
 NT                
 
 SEVERITY      
 
               
 
              
 
 OFFICE     53256      [facility]                     [facility]  FRED M
 
 T VISIT                                                    
 
 15                            
 
 MINUTES       
 
               
 
             
 
 EMERGENCY    97596      [facility]  [place], 
 
    0          0                     [facility] [place] 
 
 DEPARTMEN                               EMERGENCY  A        
 
 T VISIT           [facility] INC          
 
 HIGH/URGE               
 
 NT                
 
 SEVERITY      
 
               
 
              
 
 HOSPITAL                [place]            
 
 -   0          0                     [facility]           
 
 T                                             
 
                         
 
       
 
 HOSPITAL                [place]            
 
 -   9          9                     [facility]           
 
 T                                             
 
                         
 
       
 
 EMERGENCY    61225      [facility], 
 
    8          8                     [facility]                               EMERGENCY           
 
 T VISIT           PHYS INC         
 
 MODERATE                
 
 SEVERITY                
 
               
 
              
 
 OFFICE     97288      [facility]  8          8                     [facility] 
 
 T VISIT                                                    
 
 15                           
 
 MINUTES       
 
               
 
             
 
 HOSPITAL                [facility]            
 
 -   8          8                     [facility]            
 
 OUTPATIEN                                   INC                 
 
 T                                             
 
                   
 
       
 
 EMERGENCY    59397      [facility]            
 
    8          8                     [facility]            
 
 [facility]                 
 
 T VISIT                            
 
 HIGH/URGE         
 
 NT      
 
 SEVERITY      
 
               
 
              
 
 HOSPITAL                [place]            
 
 -   8          8                     [facility]            
 
 T                                             
 
                        
 
       
 
 OFFICE     70517      [facility] 
 
 [facility]  8          8                     [facility] T   
 
 T VISIT                                                    
 
 15                      
 
 MINUTES       
 
               
 
             
 
 HOSPITAL                [place]            
 
 -   8          8                     [facility]            
 
 OUTPATIEN                                   INC                 
 
 T                                             
 
                   
 
       
 
 EMERGENCY    09709      [facility]            
 
    8          8                     [facility]            
 
 [facility]                 
 
 T VISIT                            
 
 MODERATE          
 
 SEVERITY      
 
               
 
              
 
 HOSPITAL                [facility]            
 
 -   8          8                     [facility]            
 
 INPATIENT                                   INC                 
 
                                               
 
                   
 
 OFFICE     77444      [facility]  8          8                     [facility] 
 
 T VISIT                                                    
 
 15                           
 
 MINUTES       
 
               
 
             
 
 HOSPITAL                [place]            
 
 -   8          8                     [facility]            
 
 OUTPATIEN                                   INC                 
 
 T                                             
 
                   
 
       
 
 HOSPITAL                [facility]            
 
 -   8          8                     [facility]            
 
 OUTPATIEN                                   INC                 
 
 T

## 2017-10-11 NOTE — EXTERNAL MEDICAL SUMMARY RPT
[facility] Patient Summary
 Created on: 10/06/2017
 
ANTONI NAIR
External Reference #: [id number]
: 76
Sex: Female
 
Demographics
 
 
 
 Address  [address] [place] [address]
 
 Home Phone  [phone]
 
 Preferred Language  Unknown
 
 Marital Status  Unknown
 
 [employer] Affiliation  Unknown
 
 Race  Unknown
 
 Ethnic Group  Unknown
 
 
Author
 
 
 
 Author  [employer], [employer] 
 
 Organization  [employer]
 
 Address  Unknown
 
 Phone  Unavailable
 
 
 
Results
 
 
 
 Vancomycin [Mass/volume] in Serum or Plasma --trough
 
 
 
 
 Observa  Value  Referen  Units  Interpr  Notes  Date
 
 tion   ce    etation  
 
   Range    
 
 
 
 
 Vancomyci  10.0 -   mcg/mL  Low  RESULTS   Sep 20 
 
 n   20.0    CALLED TO  2017 
 
 [Mass/vol        12:35 PM
 
 ume] in      PHARMACIS 
 
 Serum or      T:  
 
 Plasma      ANASTASIA  
 
 --trough     L17 
 
      1304  
 
     Eufemia Miller 
 
 
 
 
 Basic metabolic panel in Blood
 
 
 
 
 Observa  Value  Referen  Units  Interpr  Notes  Date
 
 tion   ce    etation  
 
   Range    
 
 
 
 
 Urea   7 - 18  mg/dL  Low  No   Sep 19 
 
 nitrogen      informati  2017 6:05
 
 [Mass/vol     on in    AM
 
 ume] in      source  
 
 Serum or      data 
 
 Plasma     
 
 Calcium   8.5 -   mg/dL  Normal  No   Sep 19 
 
 [Mass/vol  10.1    informati  2017 6:05
 
 ume] in      on in    AM
 
 Serum or      source  
 
 Plasma     data 
 
 Chloride   98 - 107  mmoL/L  High  No   Sep 19 
 
 [Moles/vo     informati  2017 6:05
 
 lume] in      on in    AM
 
 Serum or      source  
 
 Plasma     data 
 
 Carbon   21.0 -   mmoL/L  Normal  No   Sep 19 
 
 dioxide,   32.0    informati  2017 6:05
 
 total      on in    AM
 
 [Moles/vo     source  
 
 lume] in      data 
 
 Serum or      
 
 Plasma     
 
 Creatinin  0.55 -   mg/dL  Normal  No   Sep 19 
 
 e   1.02    informati  2017 6:05
 
 [Mass/vol     on in    AM
 
 ume] in      source  
 
 Serum or      data 
 
 Plasma     
 
 Creatinin  50 - 200  ML/MIN  Normal  No   Sep 19 
 
 e renal      informati  2017 6:05
 
 clearance     on in    AM
 
       source  
 
 predicted     data 
 
  by      
 
 Cockcroft     
 
 -Gault      
 
 formula     
 
 Estimated  59-  ML/MIN  No   REFERENCE  Sep 19 
 
      informati   RANGE:   2017 6:05
 
 glomerula    on in   >60    AM
 
 r     source   ML/MIN/1. 
 
 filtratio    data  73 SQUARE 
 
 n rate       METERSIf 
 
 (GF      this  
 
     patient  
 
     is  
 
     -A 
 
     merican,  
 
     then  
 
     multiply  
 
     theresult 
 
      by  
 
     1.210. 
 
 Glucose   74 - 106  mg/dL  Normal  No   Sep 19 
 
 [Mass/vol     informati  2017 6:05
 
 ume] in      on in    AM
 
 Serum or      source  
 
 Plasma     data 
 
 Potassium  3.5 - 5.1  mmoL/L  Low  No   Sep 19 
 
       informati  2017 6:05
 
 [Moles/vo     on in    AM
 
 lume] in      source  
 
 Serum or      data 
 
 Plasma     
 
 Sodium   136 - 145  mmoL/L  Normal  No   Sep 19 
 
 [Moles/vo     informati  2017 6:05
 
 lume] in      on in    AM
 
 Serum or      source  
 
 Plasma     data 
 
 
 
 
 CBC W Auto Differential panel in Blood
 
 
 
 
 Observa  Value  Referen  Units  Interpr  Notes  Date
 
 tion   ce    etation  
 
   Range    
 
 
 
 
 Basophils  0 - 0.2  K/MM3  Normal  No   Sep 19 
 
       informati  2017 6:05
 
 [#/volume     on in    AM
 
 ] in      source  
 
 Blood by      data 
 
 Automated     
 
  count     
 
 Basophils  0.1 - 2.0  %  Normal  No   Sep 19 
 
 /100      informati  2017 6:05
 
 leukocyte     on in    AM
 
 s in      source  
 
 Blood by      data 
 
 Automated     
 
  count     
 
 Eosinophi  0.0 - 0.4  K/mm3  High  No   Sep 19 
 
 ls      informati  2017 6:05
 
 [#/volume     on in    AM
 
 ] in      source  
 
 Blood by      data 
 
 Automated     
 
  count     
 
 Eosinophi  0.1 -   %  Normal  No   Sep 19 
 
 ls/100   12.0    informati  2017 6:05
 
 leukocyte     on in    AM
 
 s in      source  
 
 Blood by      data 
 
 Automated     
 
  count     
 
 Granulocy  1.8 - 7.8  K/mm3  High  No   Sep 19 
 
 jose r      informati  2017 6:05
 
 [#/volume     on in    AM
 
 ] in      source  
 
 Blood by      data 
 
 Automated     
 
  count     
 
 Granulocy  37.0 -   %  Normal  No   Sep 19 
 
 jose r/100   80.0    informati  2017 6:05
 
 leukocyte     on in    AM
 
 s in      source  
 
 Blood by      data 
 
 Automated     
 
  count     
 
 Hematocri  37.0 -   %  Normal  No   Sep 19 
 
 t [Volume  47.0    informati  2017 6:05
 
       on in    AM
 
 Fraction]     source  
 
  of Blood     data 
 
 Hemoglobi  12.2 -   g/dL  No   No   Sep 19 
 
 n   16.2   informati  informati  2017 6:05
 
 [Mass/vol    on in   on in    AM
 
 ume] in     source   source  
 
 Blood    data  data 
 
 Lymphocyt  0.7 - 4.5  K/mm3  Normal  No   Sep 19 
 
 es      informati  2017 6:05
 
 [#/volume     on in    AM
 
 ] in      source  
 
 Unspecifi     data 
 
 ed      
 
 specimen      
 
 by      
 
 Automated     
 
  count     
 
 Lymphocyt  10 - 50.0  %  Normal  No   Sep 19 
 
 es      informati  2017 6:05
 
 [#/volume     on in    AM
 
 ] in      source  
 
 Unspecifi     data 
 
 ed      
 
 specimen      
 
 by      
 
 Automated     
 
  count     
 
 Erythrocy  27 - 31.2  pg  Normal  No   Sep 19 
 
 te mean      informati  2017 6:05
 
 corpuscul     on in    AM
 
 ar      source  
 
 hemoglobi     data 
 
 n      
 
 [Entitic      
 
 mass]     
 
 Erythrocy  31.8 -   g/dl  Normal  No   Sep 19 
 
 te mean   35.4    informati  2017 6:05
 
 corpuscul     on in    AM
 
 ar      source  
 
 hemoglobi     data 
 
 n      
 
 concentra     
 
 tion      
 
 [Mass/vol     
 
 ume] by      
 
 Automated     
 
  count     
 
 Erythrocy  82.2 -   fl  Normal  No   Sep 19 
 
 te mean   97.8    informati  2017 6:05
 
 corpuscul     on in    AM
 
 ar volume     source  
 
  [Entitic     data 
 
  volume]      
 
 by      
 
 Automated     
 
  count     
 
 Monocytes  0.1 - 1.0  K/mm3  Normal  No   Sep 19 
 
       informati  2017 6:05
 
 [#/volume     on in    AM
 
 ] in      source  
 
 Blood by      data 
 
 Automated     
 
  count     
 
 Monocytes  1.7 - 9.3  %  Normal  No   Sep 19 
 
 /100      informati  2017 6:05
 
 leukocyte     on in    AM
 
 s in      source  
 
 Blood by      data 
 
 Automated     
 
  count     
 
 Platelet   7.4 -   fl  Low  No   Sep 19 
 
 mean   10.4    informati  2017 6:05
 
 volume      on in    AM
 
 [Entitic      source  
 
 volume]      data 
 
 in Blood      
 
 by      
 
 Automated     
 
  count     
 
 Platelets  142 - 424  K/mm3  Normal  No   Sep 19 
 
       informati  2017 6:05
 
 [#/volume     on in    AM
 
 ] in      source  
 
 Blood     data 
 
 Erythrocy  4.2 - 5.4  M/mm3  Normal  No   Sep 19 
 
 jose r      informati  2017 6:05
 
 [#/volume     on in    AM
 
 ] in      source  
 
 Amniotic      data 
 
 fluid     
 
 Erythrocy  11.5 -   %  Normal  No   Sep 19 
 
 te   17.5    informati  2017 6:05
 
 distribut     on in    AM
 
 ion width     source  
 
  [Entitic     data 
 
  volume]      
 
 by      
 
 Automated     
 
  count     
 
 Leukocyte  4.8 -   K/MM3  High  No   Sep 19 
 
 s   10.8    informati  2017 6:05
 
 [#/volume     on in    AM
 
 ] in      source  
 
 Blood     data 
 
 
 
 
 CBC W Auto Differential panel in Blood
 
 
 
 
 Observa  Value  Referen  Units  Interpr  Notes  Date
 
 tion   ce    etation  
 
   Range    
 
 
 
 
 Basophils  0 - 0.2  K/MM3  Normal  No   Sep 18 
 
       informati  2017 4:15
 
 [#/volume     on in    PM
 
 ] in      source  
 
 Blood by      data 
 
 Automated     
 
  count     
 
 Basophils  0.1 - 2.0  %  Normal  No   Sep 18 
 
 /100      informati  2017 4:15
 
 leukocyte     on in    PM
 
 s in      source  
 
 Blood by      data 
 
 Automated     
 
  count     
 
 Eosinophi  0.0 - 0.4  K/mm3  Normal  No   Sep 18 
 
 ls      informati   4:15
 
 [#/volume     on in    PM
 
 ] in      source  
 
 Blood by      data 
 
 Automated     
 
  count     
 
 Eosinophi  0.1 -   %  Normal  No   Sep 18 
 
 ls/100   12.0    informati  2017 4:15
 
 leukocyte     on in    PM
 
 s in      source  
 
 Blood by      data 
 
 Automated     
 
  count     
 
 Granulocy  1.8 - 7.8  K/mm3  High  No   Sep 18 
 
 jose r      informati  2017 4:15
 
 [#/volume     on in    PM
 
 ] in      source  
 
 Blood by      data 
 
 Automated     
 
  count     
 
 Granulocy  37.0 -   %  Normal  No   Sep 18 
 
 jose r/100   80.0    informati  2017 4:15
 
 leukocyte     on in    PM
 
 s in      source  
 
 Blood by      data 
 
 Automated     
 
  count     
 
 Hematocri  37.0 -   %  Normal  No   Sep 18 
 
 t [Volume  47.0    informati  2017 4:15
 
       on in    PM
 
 Fraction]     source  
 
  of Blood     data 
 
 Hemoglobi  12.2 -   g/dL  Normal  No   Sep 18 
 
 n   16.2    informati  2017 4:15
 
 [Mass/vol     on in    PM
 
 ume] in      source  
 
 Blood     data 
 
 Lymphocyt  0.7 - 4.5  K/mm3  Normal  No   Sep 18 
 
 es      informati  2017 4:15
 
 [#/volume     on in    PM
 
 ] in      source  
 
 Unspecifi     data 
 
 ed      
 
 specimen      
 
 by      
 
 Automated     
 
  count     
 
 Lymphocyt  10 - 50.0  %  Normal  No   Sep 18 
 
 es      informati   4:15
 
 [#/volume     on in    PM
 
 ] in      source  
 
 Unspecifi     data 
 
 ed      
 
 specimen      
 
 by      
 
 Automated     
 
  count     
 
 Erythrocy  27 - 31.2  pg  Normal  No   Sep 18 
 
 te mean      informati  2017 4:15
 
 corpuscul     on in    PM
 
 ar      source  
 
 hemoglobi     data 
 
 n      
 
 [Entitic      
 
 mass]     
 
 Erythrocy  31.8 -   g/dl  Normal  No   Sep 18 
 
 te mean   35.4    informati  2017 4:15
 
 corpuscul     on in    PM
 
 ar      source  
 
 hemoglobi     data 
 
 n      
 
 concentra     
 
 tion      
 
 [Mass/vol     
 
 ume] by      
 
 Automated     
 
  count     
 
 Erythrocy  82.2 -   fl  Normal  No   Sep 18 
 
 te mean   97.8    informati   4:15
 
 corpuscul     on in    PM
 
 ar volume     source  
 
  [Entitic     data 
 
  volume]      
 
 by      
 
 Automated     
 
  count     
 
 Monocytes  0.1 - 1.0  K/mm3  Normal  No   Sep 18 
 
       informati  2017 4:15
 
 [#/volume     on in    PM
 
 ] in      source  
 
 Blood by      data 
 
 Automated     
 
  count     
 
 Monocytes  1.7 - 9.3  %  Normal  No   Sep 18 
 
 /100      informati  2017 4:15
 
 leukocyte     on in    PM
 
 s in      source  
 
 Blood by      data 
 
 Automated     
 
  count     
 
 Platelet   7.4 -   fl  Low  No   Sep 18 
 
 mean   10.4    informati   4:15
 
 volume      on in    PM
 
 [Entitic      source  
 
 volume]      data 
 
 in Blood      
 
 by      
 
 Automated     
 
  count     
 
 Platelets  142 - 424  K/mm3  High  No   Sep 18 
 
       informati   4:15
 
 [#/volume     on in    PM
 
 ] in      source  
 
 Blood     data 
 
 Erythrocy  4.2 - 5.4  M/mm3  Normal  No   Sep 18 
 
 jose r      informati  2017 4:15
 
 [#/volume     on in    PM
 
 ] in      source  
 
 Amniotic      data 
 
 fluid     
 
 Erythrocy  11.5 -   %  Normal  No   Sep 18 
 
 te   17.5    informati  2017 4:15
 
 distribut     on in    PM
 
 ion width     source  
 
  [Entitic     data 
 
  volume]      
 
 by      
 
 Automated     
 
  count     
 
 Leukocyte  4.8 -   K/MM3  High  No   Sep 18 
 
 s   10.8    informati   4:15
 
 [#/volume     on in    PM
 
 ] in      source  
 
 Blood     data 
 
 
 
 
 Differential panel, method unspecified -
 
 
 
 
 Observa  Value  Referen  Units  Interpr  Notes  Date
 
 tion   ce    etation  
 
   Range    
 
 LYMPH  25  10 - 50  %  Normal  No   Sep 18 
 
      informa  2017 
 
      tion in  4:15 PM
 
       source 
 
       data 
 
 
 
 
 Monocytes  2 - 9  %  Normal  No   Sep 18 
 
 /100      informati  2017 4:15
 
 leukocyte     on in    PM
 
 s in      source  
 
 Blood by      data 
 
 Automated     
 
  count     
 
 
 
 
 Platele  NORMAL  No   No   No   No   Sep 18 
 
 ts    informa  informa  informa  informa   
 
 [Presen   tion in  tion in  tion in  tion in  4:15 PM
 
 ce] in     source   source   source   source 
 
 Blood     data   data   data   data 
 
 by       
 
 Light       
 
 microsc      
 
 opy      
 
 
 
 
 Neutrophi  42 - 76  %  Normal  No   Sep 18 
 
 ls      informati   4:15
 
 [#/volume     on in    PM
 
 ] in      source  
 
 Blood by      data 
 
 Automated     
 
  count     
 
 Cells   No   #CELLS  No   No   Sep 18 
 
 Counted   informati   informati  informati   4:15
 
 Total [#]  on in    on in   on in    PM
 
  in Blood  source    source   source  
 
  data   data  data 
 
 
 
 
 Erythrocyte sedimentation rate by Westergren method
 
 
 
 
 Observa  Value  Referen  Units  Interpr  Notes  Date
 
 tion   ce    etation  
 
   Range    
 
 
 
 
 Erythrocy  0 - 20  mm/hr  High  No   Sep 18 
 
 te      informati   4:15
 
 sedimenta     on in    PM
 
 tion rate     source  
 
  by      data 
 
 Westergre     
 
 n method     
 
 
 
 
 Comprehensive metabolic 2000 panel in Serum or Plasma
 
 
 
 
 Observa  Value  Referen  Units  Interpr  Notes  Date
 
 tion   ce    etation  
 
   Range    
 
 
 
 
 Albumin/G  1.1 - 1.8  No   Low  No   Sep 18 
 
 lobulin    informati   informati   4:15
 
 [Mass    on in    on in    PM
 
 ratio] in   source    source  
 
  Serum or   data   data 
 
  Plasma     
 
 Albumin   3.4 - 5.0  gm/dL  Normal  No   Sep 18 
 
 [Mass/vol     informati  2017 4:15
 
 ume] in      on in    PM
 
 Serum or      source  
 
 Plasma     data 
 
 Alkaline   46 - 116  U/L  Normal  No   Sep 18 
 
 phosphata     informati  2017 4:15
 
 se      on in    PM
 
 [Enzymati     source  
 
 c      data 
 
 activity/     
 
 volume]      
 
 in Serum      
 
 or Plasma     
 
 Bilirubin  0.2 - 1.0  mg/dL  Normal  No   Sep 18 
 
 .total      informati  2017 4:15
 
 [Mass/vol     on in    PM
 
 ume] in      source  
 
 Serum or      data 
 
 Plasma     
 
 Urea   7 - 18  mg/dL  Normal  No   Sep 18 
 
 nitrogen      informati  2017 4:15
 
 [Mass/vol     on in    PM
 
 ume] in      source  
 
 Serum or      data 
 
 Plasma     
 
 Calcium   8.5 -   mg/dL  Normal  No   Sep 18 
 
 [Mass/vol  10.1    informati  2017 4:15
 
 ume] in      on in    PM
 
 Serum or      source  
 
 Plasma     data 
 
 Chloride   98 - 107  mmoL/L  Normal  No   Sep 18 
 
 [Moles/vo     informati  2017 4:15
 
 lume] in      on in    PM
 
 Serum or      source  
 
 Plasma     data 
 
 Carbon   21.0 -   mmoL/L  Normal  No   Sep 18 
 
 dioxide,   32.0    informati  2017 4:15
 
 total      on in    PM
 
 [Moles/vo     source  
 
 lume] in      data 
 
 Serum or      
 
 Plasma     
 
 Creatinin  0.55 -   mg/dL  Normal  No   Sep 18 
 
 e   1.02    informati   4:15
 
 [Mass/vol     on in    PM
 
 ume] in      source  
 
 Serum or      data 
 
 Plasma     
 
 Creatinin  50 - 200  ML/MIN  Normal  No   Sep 18 
 
 e renal      informati   4:15
 
 clearance     on in    PM
 
       source  
 
 predicted     data 
 
  by      
 
 Cockcroft     
 
 -Gault      
 
 formula     
 
 Estimated  59-  ML/MIN  No   REFERENCE  Sep 18 
 
      informati   RANGE:   2017 4:15
 
 glomerula    on in   >60    PM
 
 r     source   ML/MIN/1. 
 
 filtratio    data  73 SQUARE 
 
 n rate       METERSIf 
 
 (GF      this  
 
     patient  
 
     is  
 
     -A 
 
     merican,  
 
     then  
 
     multiply  
 
     theresult 
 
      by  
 
     1.210. 
 
 Globulin   1.3 - 3.2  gm/dL  High  No   Sep 18 
 
 [Mass/vol     informati  2017 4:15
 
 ume] in      on in    PM
 
 Serum     source  
 
     data 
 
 Glucose   74 - 106  mg/dL  Normal  No   Sep 18 
 
 [Mass/vol     informati  2017 4:15
 
 ume] in      on in    PM
 
 Serum or      source  
 
 Plasma     data 
 
 Potassium  3.5 - 5.1  mmoL/L  Normal  No   Sep 18 
 
       informati  2017 4:15
 
 [Moles/vo     on in    PM
 
 lume] in      source  
 
 Serum or      data 
 
 Plasma     
 
 Sodium   136 - 145  mmoL/L  Normal  No   Sep 18 
 
 [Moles/vo     informati  2017 4:15
 
 lume] in      on in    PM
 
 Serum or      source  
 
 Plasma     data 
 
 Aspartate  15 - 37  U/L  Low  No   Sep 18 
 
       informati  2017 4:15
 
 aminotran     on in    PM
 
 sferase      source  
 
 [Enzymati     data 
 
 c      
 
 activity/     
 
 volume]      
 
 in Serum      
 
 or Plasma     
 
 Alanine   12 - 78  U/L  Low  No   Sep 18 
 
 aminotran     informati  2017 4:15
 
 sferase      on in    PM
 
 [Enzymati     source  
 
 c      data 
 
 activity/     
 
 volume]      
 
 in Serum      
 
 or Plasma     
 
 Protein   6.4 - 8.2  gm/dL  Normal  No   Sep 18 
 
 [Mass/vol     informati  2017 4:15
 
 ume] in      on in    PM
 
 Serum or      source  
 
 Plasma     data

## 2017-10-11 NOTE — EXTERNAL MEDICAL SUMMARY RPT
Patient Summary
 Created on: 10/06/2017
 
ANTONI NAIR
: 76
Sex: Undifferentiated
 
Demographics
 
 
 
 Preferred Language  English
 
 Marital Status  Unknown
 
 [employer] Aff[employer]  Unknown
 
 Race  Unknown
 
 Ethnic Group  Unknown
 
 
Author
 
 
 
 Author            [facility]
 
 Address  Unknown
 
 Phone  [email or web address]
 
                                                                
 
Immunization
                                    No patient found.

## 2017-10-11 NOTE — EXTERNAL MEDICAL SUMMARY RPT
[facility] Patient Summary
 Created on: 10/06/2017
 
ANTONI NAIR
External Reference #: [id number]
: 76
Sex: Female
 
Demographics
 
 
 
 Address  [address] [place] [address]
 
 Home Phone  [phone]
 
 Preferred Language  Unknown
 
 Marital Status  Unknown
 
 Sikhism Affiliation  Unknown
 
 Race  Unknown
 
 Ethnic Group  Unknown
 
 
Author
 
 
 
 Author  [employer], [facility] [employer] 
 
 Organization  [employer]
 
 Address  Unknown
 
 Phone  Unavailable
 
 
 
Results
 
 
 
 Vancomycin [Mass/volume] in Serum or Plasma --trough
 
 
 
 
 Observa  Value  Referen  Units  Interpr  Notes  Date
 
 tion   ce    etation  
 
   Range    
 
 
 
 
 Vancomyci  10.0 -   mcg/mL  Low  RESULTS   Sep 20 
 
 n   20.0    CALLED TO  2017 
 
 [Mass/vol        12:35 PM
 
 ume] in      PHARMACIS 
 
 Serum or      T:  
 
 Plasma      ANASTASIA  
 
 --trough     L17 
 
      1304  
 
     Eufemia Millre 
 
 
 
 
 Basic metabolic panel in Blood
 
 
 
 
 Observa  Value  Referen  Units  Interpr  Notes  Date
 
 tion   ce    etation  
 
   Range    
 
 
 
 
 Urea   7 - 18  mg/dL  Low  No   Sep 19 
 
 nitrogen      informati  2017 6:05
 
 [Mass/vol     on in    AM
 
 ume] in      source  
 
 Serum or      data 
 
 Plasma     
 
 Calcium   8.5 -   mg/dL  Normal  No   Sep 19 
 
 [Mass/vol  10.1    informati  2017 6:05
 
 ume] in      on in    AM
 
 Serum or      source  
 
 Plasma     data 
 
 Chloride   98 - 107  mmoL/L  High  No   Sep 19 
 
 [Moles/vo     informati  2017 6:05
 
 lume] in      on in    AM
 
 Serum or      source  
 
 Plasma     data 
 
 Carbon   21.0 -   mmoL/L  Normal  No   Sep 19 
 
 dioxide,   32.0    informati  2017 6:05
 
 total      on in    AM
 
 [Moles/vo     source  
 
 lume] in      data 
 
 Serum or      
 
 Plasma     
 
 Creatinin  0.55 -   mg/dL  Normal  No   Sep 19 
 
 e   1.02    informati  2017 6:05
 
 [Mass/vol     on in    AM
 
 ume] in      source  
 
 Serum or      data 
 
 Plasma     
 
 Creatinin  50 - 200  ML/MIN  Normal  No   Sep 19 
 
 e renal      informati  2017 6:05
 
 clearance     on in    AM
 
       source  
 
 predicted     data 
 
  by      
 
 Cockcroft     
 
 -Gault      
 
 formula     
 
 Estimated  59-  ML/MIN  No   REFERENCE  Sep 19 
 
      informati   RANGE:   2017 6:05
 
 glomerula    on in   >60    AM
 
 r     source   ML/MIN/1. 
 
 filtratio    data  73 SQUARE 
 
 n rate       METERSIf 
 
 (GF      this  
 
     patient  
 
     is  
 
     -A 
 
     merican,  
 
     then  
 
     multiply  
 
     theresult 
 
      by  
 
     1.210. 
 
 Glucose   74 - 106  mg/dL  Normal  No   Sep 19 
 
 [Mass/vol     informati  2017 6:05
 
 ume] in      on in    AM
 
 Serum or      source  
 
 Plasma     data 
 
 Potassium  3.5 - 5.1  mmoL/L  Low  No   Sep 19 
 
       informati  2017 6:05
 
 [Moles/vo     on in    AM
 
 lume] in      source  
 
 Serum or      data 
 
 Plasma     
 
 Sodium   136 - 145  mmoL/L  Normal  No   Sep 19 
 
 [Moles/vo     informati  2017 6:05
 
 lume] in      on in    AM
 
 Serum or      source  
 
 Plasma     data 
 
 
 
 
 CBC W Auto Differential panel in Blood
 
 
 
 
 Observa  Value  Referen  Units  Interpr  Notes  Date
 
 tion   ce    etation  
 
   Range    
 
 
 
 
 Basophils  0 - 0.2  K/MM3  Normal  No   Sep 19 
 
       informati  2017 6:05
 
 [#/volume     on in    AM
 
 ] in      source  
 
 Blood by      data 
 
 Automated     
 
  count     
 
 Basophils  0.1 - 2.0  %  Normal  No   Sep 19 
 
 /100      informati  2017 6:05
 
 leukocyte     on in    AM
 
 s in      source  
 
 Blood by      data 
 
 Automated     
 
  count     
 
 Eosinophi  0.0 - 0.4  K/mm3  High  No   Sep 19 
 
 ls      informati  2017 6:05
 
 [#/volume     on in    AM
 
 ] in      source  
 
 Blood by      data 
 
 Automated     
 
  count     
 
 Eosinophi  0.1 -   %  Normal  No   Sep 19 
 
 ls/100   12.0    informati  2017 6:05
 
 leukocyte     on in    AM
 
 s in      source  
 
 Blood by      data 
 
 Automated     
 
  count     
 
 Granulocy  1.8 - 7.8  K/mm3  High  No   Sep 19 
 
 jose r      informati  2017 6:05
 
 [#/volume     on in    AM
 
 ] in      source  
 
 Blood by      data 
 
 Automated     
 
  count     
 
 Granulocy  37.0 -   %  Normal  No   Sep 19 
 
 jose r/100   80.0    informati  2017 6:05
 
 leukocyte     on in    AM
 
 s in      source  
 
 Blood by      data 
 
 Automated     
 
  count     
 
 Hematocri  37.0 -   %  Normal  No   Sep 19 
 
 t [Volume  47.0    informati  2017 6:05
 
       on in    AM
 
 Fraction]     source  
 
  of Blood     data 
 
 Hemoglobi  12.2 -   g/dL  No   No   Sep 19 
 
 n   16.2   informati  informati  2017 6:05
 
 [Mass/vol    on in   on in    AM
 
 ume] in     source   source  
 
 Blood    data  data 
 
 Lymphocyt  0.7 - 4.5  K/mm3  Normal  No   Sep 19 
 
 es      informati  2017 6:05
 
 [#/volume     on in    AM
 
 ] in      source  
 
 Unspecifi     data 
 
 ed      
 
 specimen      
 
 by      
 
 Automated     
 
  count     
 
 Lymphocyt  10 - 50.0  %  Normal  No   Sep 19 
 
 es      informati  2017 6:05
 
 [#/volume     on in    AM
 
 ] in      source  
 
 Unspecifi     data 
 
 ed      
 
 specimen      
 
 by      
 
 Automated     
 
  count     
 
 Erythrocy  27 - 31.2  pg  Normal  No   Sep 19 
 
 te mean      informati  2017 6:05
 
 corpuscul     on in    AM
 
 ar      source  
 
 hemoglobi     data 
 
 n      
 
 [Entitic      
 
 mass]     
 
 Erythrocy  31.8 -   g/dl  Normal  No   Sep 19 
 
 te mean   35.4    informati  2017 6:05
 
 corpuscul     on in    AM
 
 ar      source  
 
 hemoglobi     data 
 
 n      
 
 concentra     
 
 tion      
 
 [Mass/vol     
 
 ume] by      
 
 Automated     
 
  count     
 
 Erythrocy  82.2 -   fl  Normal  No   Sep 19 
 
 te mean   97.8    informati  2017 6:05
 
 corpuscul     on in    AM
 
 ar volume     source  
 
  [Entitic     data 
 
  volume]      
 
 by      
 
 Automated     
 
  count     
 
 Monocytes  0.1 - 1.0  K/mm3  Normal  No   Sep 19 
 
       informati  2017 6:05
 
 [#/volume     on in    AM
 
 ] in      source  
 
 Blood by      data 
 
 Automated     
 
  count     
 
 Monocytes  1.7 - 9.3  %  Normal  No   Sep 19 
 
 /100      informati  2017 6:05
 
 leukocyte     on in    AM
 
 s in      source  
 
 Blood by      data 
 
 Automated     
 
  count     
 
 Platelet   7.4 -   fl  Low  No   Sep 19 
 
 mean   10.4    informati  2017 6:05
 
 volume      on in    AM
 
 [Entitic      source  
 
 volume]      data 
 
 in Blood      
 
 by      
 
 Automated     
 
  count     
 
 Platelets  142 - 424  K/mm3  Normal  No   Sep 19 
 
       informati  2017 6:05
 
 [#/volume     on in    AM
 
 ] in      source  
 
 Blood     data 
 
 Erythrocy  4.2 - 5.4  M/mm3  Normal  No   Sep 19 
 
 jose r      informati  2017 6:05
 
 [#/volume     on in    AM
 
 ] in      source  
 
 Amniotic      data 
 
 fluid     
 
 Erythrocy  11.5 -   %  Normal  No   Sep 19 
 
 te   17.5    informati  2017 6:05
 
 distribut     on in    AM
 
 ion width     source  
 
  [Entitic     data 
 
  volume]      
 
 by      
 
 Automated     
 
  count     
 
 Leukocyte  4.8 -   K/MM3  High  No   Sep 19 
 
 s   10.8    informati  2017 6:05
 
 [#/volume     on in    AM
 
 ] in      source  
 
 Blood     data 
 
 
 
 
 CBC W Auto Differential panel in Blood
 
 
 
 
 Observa  Value  Referen  Units  Interpr  Notes  Date
 
 tion   ce    etation  
 
   Range    
 
 
 
 
 Basophils  0 - 0.2  K/MM3  Normal  No   Sep 18 
 
       informati  2017 4:15
 
 [#/volume     on in    PM
 
 ] in      source  
 
 Blood by      data 
 
 Automated     
 
  count     
 
 Basophils  0.1 - 2.0  %  Normal  No   Sep 18 
 
 /100      informati  2017 4:15
 
 leukocyte     on in    PM
 
 s in      source  
 
 Blood by      data 
 
 Automated     
 
  count     
 
 Eosinophi  0.0 - 0.4  K/mm3  Normal  No   Sep 18 
 
 ls      informati   4:15
 
 [#/volume     on in    PM
 
 ] in      source  
 
 Blood by      data 
 
 Automated     
 
  count     
 
 Eosinophi  0.1 -   %  Normal  No   Sep 18 
 
 ls/100   12.0    informati  2017 4:15
 
 leukocyte     on in    PM
 
 s in      source  
 
 Blood by      data 
 
 Automated     
 
  count     
 
 Granulocy  1.8 - 7.8  K/mm3  High  No   Sep 18 
 
 jose r      informati  2017 4:15
 
 [#/volume     on in    PM
 
 ] in      source  
 
 Blood by      data 
 
 Automated     
 
  count     
 
 Granulocy  37.0 -   %  Normal  No   Sep 18 
 
 jose r/100   80.0    informati  2017 4:15
 
 leukocyte     on in    PM
 
 s in      source  
 
 Blood by      data 
 
 Automated     
 
  count     
 
 Hematocri  37.0 -   %  Normal  No   Sep 18 
 
 t [Volume  47.0    informati  2017 4:15
 
       on in    PM
 
 Fraction]     source  
 
  of Blood     data 
 
 Hemoglobi  12.2 -   g/dL  Normal  No   Sep 18 
 
 n   16.2    informati  2017 4:15
 
 [Mass/vol     on in    PM
 
 ume] in      source  
 
 Blood     data 
 
 Lymphocyt  0.7 - 4.5  K/mm3  Normal  No   Sep 18 
 
 es      informati  2017 4:15
 
 [#/volume     on in    PM
 
 ] in      source  
 
 Unspecifi     data 
 
 ed      
 
 specimen      
 
 by      
 
 Automated     
 
  count     
 
 Lymphocyt  10 - 50.0  %  Normal  No   Sep 18 
 
 es      informati   4:15
 
 [#/volume     on in    PM
 
 ] in      source  
 
 Unspecifi     data 
 
 ed      
 
 specimen      
 
 by      
 
 Automated     
 
  count     
 
 Erythrocy  27 - 31.2  pg  Normal  No   Sep 18 
 
 te mean      informati  2017 4:15
 
 corpuscul     on in    PM
 
 ar      source  
 
 hemoglobi     data 
 
 n      
 
 [Entitic      
 
 mass]     
 
 Erythrocy  31.8 -   g/dl  Normal  No   Sep 18 
 
 te mean   35.4    informati  2017 4:15
 
 corpuscul     on in    PM
 
 ar      source  
 
 hemoglobi     data 
 
 n      
 
 concentra     
 
 tion      
 
 [Mass/vol     
 
 ume] by      
 
 Automated     
 
  count     
 
 Erythrocy  82.2 -   fl  Normal  No   Sep 18 
 
 te mean   97.8    informati   4:15
 
 corpuscul     on in    PM
 
 ar volume     source  
 
  [Entitic     data 
 
  volume]      
 
 by      
 
 Automated     
 
  count     
 
 Monocytes  0.1 - 1.0  K/mm3  Normal  No   Sep 18 
 
       informati  2017 4:15
 
 [#/volume     on in    PM
 
 ] in      source  
 
 Blood by      data 
 
 Automated     
 
  count     
 
 Monocytes  1.7 - 9.3  %  Normal  No   Sep 18 
 
 /100      informati  2017 4:15
 
 leukocyte     on in    PM
 
 s in      source  
 
 Blood by      data 
 
 Automated     
 
  count     
 
 Platelet   7.4 -   fl  Low  No   Sep 18 
 
 mean   10.4    informati   4:15
 
 volume      on in    PM
 
 [Entitic      source  
 
 volume]      data 
 
 in Blood      
 
 by      
 
 Automated     
 
  count     
 
 Platelets  142 - 424  K/mm3  High  No   Sep 18 
 
       informati   4:15
 
 [#/volume     on in    PM
 
 ] in      source  
 
 Blood     data 
 
 Erythrocy  4.2 - 5.4  M/mm3  Normal  No   Sep 18 
 
 jose r      informati  2017 4:15
 
 [#/volume     on in    PM
 
 ] in      source  
 
 Amniotic      data 
 
 fluid     
 
 Erythrocy  11.5 -   %  Normal  No   Sep 18 
 
 te   17.5    informati  2017 4:15
 
 distribut     on in    PM
 
 ion width     source  
 
  [Entitic     data 
 
  volume]      
 
 by      
 
 Automated     
 
  count     
 
 Leukocyte  4.8 -   K/MM3  High  No   Sep 18 
 
 s   10.8    informati   4:15
 
 [#/volume     on in    PM
 
 ] in      source  
 
 Blood     data 
 
 
 
 
 Differential panel, method unspecified -
 
 
 
 
 Observa  Value  Referen  Units  Interpr  Notes  Date
 
 tion   ce    etation  
 
   Range    
 
 LYMPH  25  10 - 50  %  Normal  No   Sep 18 
 
      informa  2017 
 
      tion in  4:15 PM
 
       source 
 
       data 
 
 
 
 
 Monocytes  2 - 9  %  Normal  No   Sep 18 
 
 /100      informati  2017 4:15
 
 leukocyte     on in    PM
 
 s in      source  
 
 Blood by      data 
 
 Automated     
 
  count     
 
 
 
 
 Platele  NORMAL  No   No   No   No   Sep 18 
 
 ts    informa  informa  informa  informa   
 
 [Presen   tion in  tion in  tion in  tion in  4:15 PM
 
 ce] in     source   source   source   source 
 
 Blood     data   data   data   data 
 
 by       
 
 Light       
 
 microsc      
 
 opy      
 
 
 
 
 Neutrophi  42 - 76  %  Normal  No   Sep 18 
 
 ls      informati   4:15
 
 [#/volume     on in    PM
 
 ] in      source  
 
 Blood by      data 
 
 Automated     
 
  count     
 
 Cells   No   #CELLS  No   No   Sep 18 
 
 Counted   informati   informati  informati   4:15
 
 Total [#]  on in    on in   on in    PM
 
  in Blood  source    source   source  
 
  data   data  data 
 
 
 
 
 Erythrocyte sedimentation rate by Westergren method
 
 
 
 
 Observa  Value  Referen  Units  Interpr  Notes  Date
 
 tion   ce    etation  
 
   Range    
 
 
 
 
 Erythrocy  0 - 20  mm/hr  High  No   Sep 18 
 
 te      informati   4:15
 
 sedimenta     on in    PM
 
 tion rate     source  
 
  by      data 
 
 Westergre     
 
 n method     
 
 
 
 
 Comprehensive metabolic 2000 panel in Serum or Plasma
 
 
 
 
 Observa  Value  Referen  Units  Interpr  Notes  Date
 
 tion   ce    etation  
 
   Range    
 
 
 
 
 Albumin/G  1.1 - 1.8  No   Low  No   Sep 18 
 
 lobulin    informati   informati   4:15
 
 [Mass    on in    on in    PM
 
 ratio] in   source    source  
 
  Serum or   data   data 
 
  Plasma     
 
 Albumin   3.4 - 5.0  gm/dL  Normal  No   Sep 18 
 
 [Mass/vol     informati  2017 4:15
 
 ume] in      on in    PM
 
 Serum or      source  
 
 Plasma     data 
 
 Alkaline   46 - 116  U/L  Normal  No   Sep 18 
 
 phosphata     informati  2017 4:15
 
 se      on in    PM
 
 [Enzymati     source  
 
 c      data 
 
 activity/     
 
 volume]      
 
 in Serum      
 
 or Plasma     
 
 Bilirubin  0.2 - 1.0  mg/dL  Normal  No   Sep 18 
 
 .total      informati  2017 4:15
 
 [Mass/vol     on in    PM
 
 ume] in      source  
 
 Serum or      data 
 
 Plasma     
 
 Urea   7 - 18  mg/dL  Normal  No   Sep 18 
 
 nitrogen      informati  2017 4:15
 
 [Mass/vol     on in    PM
 
 ume] in      source  
 
 Serum or      data 
 
 Plasma     
 
 Calcium   8.5 -   mg/dL  Normal  No   Sep 18 
 
 [Mass/vol  10.1    informati  2017 4:15
 
 ume] in      on in    PM
 
 Serum or      source  
 
 Plasma     data 
 
 Chloride   98 - 107  mmoL/L  Normal  No   Sep 18 
 
 [Moles/vo     informati  2017 4:15
 
 lume] in      on in    PM
 
 Serum or      source  
 
 Plasma     data 
 
 Carbon   21.0 -   mmoL/L  Normal  No   Sep 18 
 
 dioxide,   32.0    informati  2017 4:15
 
 total      on in    PM
 
 [Moles/vo     source  
 
 lume] in      data 
 
 Serum or      
 
 Plasma     
 
 Creatinin  0.55 -   mg/dL  Normal  No   Sep 18 
 
 e   1.02    informati   4:15
 
 [Mass/vol     on in    PM
 
 ume] in      source  
 
 Serum or      data 
 
 Plasma     
 
 Creatinin  50 - 200  ML/MIN  Normal  No   Sep 18 
 
 e renal      informati   4:15
 
 clearance     on in    PM
 
       source  
 
 predicted     data 
 
  by      
 
 Cockcroft     
 
 -Gault      
 
 formula     
 
 Estimated  59-  ML/MIN  No   REFERENCE  Sep 18 
 
      informati   RANGE:   2017 4:15
 
 glomerula    on in   >60    PM
 
 r     source   ML/MIN/1. 
 
 filtratio    data  73 SQUARE 
 
 n rate       METERSIf 
 
 (GF      this  
 
     patient  
 
     is  
 
     -A 
 
     merican,  
 
     then  
 
     multiply  
 
     theresult 
 
      by  
 
     1.210. 
 
 Globulin   1.3 - 3.2  gm/dL  High  No   Sep 18 
 
 [Mass/vol     informati  2017 4:15
 
 ume] in      on in    PM
 
 Serum     source  
 
     data 
 
 Glucose   74 - 106  mg/dL  Normal  No   Sep 18 
 
 [Mass/vol     informati  2017 4:15
 
 ume] in      on in    PM
 
 Serum or      source  
 
 Plasma     data 
 
 Potassium  3.5 - 5.1  mmoL/L  Normal  No   Sep 18 
 
       informati  2017 4:15
 
 [Moles/vo     on in    PM
 
 lume] in      source  
 
 Serum or      data 
 
 Plasma     
 
 Sodium   136 - 145  mmoL/L  Normal  No   Sep 18 
 
 [Moles/vo     informati  2017 4:15
 
 lume] in      on in    PM
 
 Serum or      source  
 
 Plasma     data 
 
 Aspartate  15 - 37  U/L  Low  No   Sep 18 
 
       informati  2017 4:15
 
 aminotran     on in    PM
 
 sferase      source  
 
 [Enzymati     data 
 
 c      
 
 activity/     
 
 volume]      
 
 in Serum      
 
 or Plasma     
 
 Alanine   12 - 78  U/L  Low  No   Sep 18 
 
 aminotran     informati  2017 4:15
 
 sferase      on in    PM
 
 [Enzymati     source  
 
 c      data 
 
 activity/     
 
 volume]      
 
 in Serum      
 
 or Plasma     
 
 Protein   6.4 - 8.2  gm/dL  Normal  No   Sep 18 
 
 [Mass/vol     informati  2017 4:15
 
 ume] in      on in    PM
 
 Serum or      source  
 
 Plasma     data

## 2017-10-11 NOTE — EXTERNAL MEDICAL SUMMARY RPT
Patient Summary
 Created on: 10/06/2017
 
ANTONI NAIR
External Reference #: [id number]
: [id number]
Sex: Female
 
Demographics
 
 
 
 Address  [address] [place]  [address]
 
 Preferred Language  English
 
 Marital Status  Unknown
 
 Gnosticism Affiliation  Unknown
 
 Race  Unknown
 
 Ethnic Group  Unknown
 
 
Author
 
 
 
 Author            ,            ALFREDO RUIZROSALVA
 
 Address  Unknown
 
 Phone  [email or web address]
 
 
 
Care Team Providers
 
 
 
 Care Team Member Name  Role  Phone
 
 CITLALLI MOSQUERA,   Unavailable  Unavailable
 
 CITLALLI MOSQUERA JR, CHARLES F,  Unavailable  Unavailable
 
  JEAN-PAUL MUKHERJEE JR  
 
    
 
 [facility]   Unavailable  Unavailable
 
 [facility]   
 
 [facility]  
 
    
 
 [facility]   Unavailable  Unavailable
 
 [facility]   
 
 [facility],   Unavailable  Unavailable
 
 [facility]   
 
 [facility]   Unavailable  Unavailable
 
 [facility], [facility]   
 
 [facility]   
 
 CHAR REYNOSO,   Unavailable  Unavailable
 
 EDGARDO, CHAR B   
 
 [facility],   Unavailable  Unavailable
 
 [facility]   
 
 [facility],   Unavailable  Unavailable
 
 [facility]   
 
 [facility]   Unavailable  Unavailable
 
 [facility] [place]   
 
 JULIANNE PEÑALOZA,   Unavailable  Unavailable
 
 JULIANNE PEÑALOZA GONZALO, ROBERT GONZALO   Unavailable  Unavailable
 
 KANE JR, KANE JR   Unavailable  Unavailable
 
 CLEMENTE, CLEMENTE   Unavailable  Unavailable
 
 CLEMENTE LIDA, CLEMENTE   Unavailable  Unavailable
 
 LIDA   
 
 KARLOS CASTREJON MD,   Unavailable  Unavailable
 
 GLENYS DWYER MD LIDA   Unavailable  Unavailable
 
 HARPEL LUCIANO, HARPEL   Unavailable  Unavailable
 
 LUCIANO   
 
 BRANDOLKARLOS R,   Unavailable  Unavailable
 
 HARPEL, KARLOS R   
 
 [facility]   Unavailable  Unavailable
 
 [facility]   
 
 [facility]   
 
 HARUZAIR QUIROZ,   Unavailable  Unavailable
 
 HARFRED RIOS JR,   Unavailable  Unavailable
 
 [facility] ROY   
 
 [facility],  Unavailable  Unavailable
 
  [facility]  
 
    
 
 BRADLEY HUERTA   Unavailable  Unavailable
 
 BRADLEY MARTINEZ   Unavailable  Unavailable
 
 NAN   
 
 [facility]   Unavailable  Unavailable
 
 [facility]   
 
 [facility]   
 
 [facility],   Unavailable  Unavailable
 
 [facility]   
 
 REHMAN TANIA, REHMAN   Unavailable  Unavailable
 
 TANIA   
 
 SHAHLA KRUEGER,   Unavailable  Unavailable
 
 SHAHLA KRUEGER EMMETT P,   Unavailable  Unavailable
 
 SMILEY BRENNAN   
 
 [facility]   Unavailable  Unavailable
 
 URGENT TREAT,   
 
 [facility]   
 
 [facility]   
 
 [facility]   Unavailable  Unavailable
 
 [facility]   
 
 [facility]   Unavailable  Unavailable
 
 LAB, PATHOLOGY &   
 
 CYTOLOGY LAB   
 
 PETTEY, PETTEY   Unavailable  Unavailable
 
 RADMANESH, RADMANESH   Unavailable  Unavailable
 
 [id number],  Unavailable  Unavailable
 
  [id number]  
 
    
 
 [id number],  Unavailable  Unavailable
 
  [id number]  
 
    
 
 [facility]   Unavailable  Unavailable
 
 [address] [address] 0391, [facility]   
 
 [facility] [address] #   
 
 0391   
 
 ARTIE GLORY, ARTIE   Unavailable  Unavailable
 
 GLORY   
 
 SCALF TANIA, SCALF TANIA   Unavailable  Unavailable
 
 [facility]   Un[facility]  Unavailable
 
 [facility],   
 
 [facility]   
 
 [facility]   
 
 [facility]   Unavailable  Unavailable
 
 [facility],   
 
 [facility]   
 
 EM[facility]   
 
 NILDA SHE,   Unavailable  Unavailable
 
 Callender KATHY   
 
 XIAO GARIBAY,   Unavailable  Unavailable
 
 XIAO GARIBAY   
 
 [facility]   Unavailable  Unavailable
 
 [facility],   
 
 [facility]   
 
 [facility]   
 
 SWINEY PAT, SWINEY   Unavailable  Unavailable
 
 PAT   
 
 YUSUF CALHOUN,   Unavailable  Unavailable
 
 YUSUF CALHOUN   
 
 [facility]   Unavailable  Unavailable
 
 [address], [facility]   
 
 [facility]   
 
 [facility]   Unavailable  Unavailable
 
 [address], [facility]   
 
 [facility]   
 
 [facility] #   Unavailable  Unavailable
 
 [id number], [facility]   
 
 [facility] # [id number]   
 
 FRED FAJARDO,   Unavailable  Unavailable
 
 FRED FAJARDO RYAN B, TANA,   Unavailable  Unavailable
 
 ALEX NATHAN   
 
                                            
 
Purpose
                      Continuity of Care Document - 2008 through 10-06-
2017                                                                            
                     
 
Problems
                      
 
 
 Code         Diagnosis    DOS          Provider     Status     
 
                                                               
 
               
 
         LATERAL   2017   [facility]              
 
              EPICONDYLIT               PHYSICIANS              
 
      IS RIGHT           GROUP                   
 
  ELBOW                       
 
                  
 
      
 
         CARPAL   2017   [facility]              
 
              TUNNEL                PHYSICIANS              
 
      SYNDROME           GROUP                   
 
  RIGHT UPPER                 
 
   LIMB           
 
                
 
      
 
         LESION OF   2017   [facility]              
 
              ULNAR NERVE               PHYSICIANS              
 
       RIGHT           GROUP                   
 
  UPPER LIMB                  
 
                  
 
           
 
 [id number]       PAIN IN   2017   [facility]              
 
              RIGHT ELBOW               PHYSICIANS              
 
                           GROUP                   
 
                          
 
      
 
         MEDIAL   2017   [facility]              
 
              EPICONDYLIT               PHYSICIANS              
 
      IS RIGHT           GROUP                   
 
  ELBOW                       
 
                  
 
      
 
 M545         LOW BACK   2017   [facility]              
 
              PAIN                      PHYSICIANS              
 
                           GROUP                   
 
                  
 
      
 
         OTHER   2017   [facility]              
 
              FATIGUE                   PHYSICIANS              
 
                           GROUP                   
 
                      
 
      
 
         SCIATICA   2017   [facility]              
 
              LEFT SIDE                 HEALTH              
 
                           SOLUTIONS              
 
          IN            
 
              
 
 [id number]       CELLULITIS   2017   [facility]              
 
              OF RIGHT                HEALTH              
 
      LOWER LIMB           SOLUTIONS              
 
                IN            
 
                     
 
         CUTANEOUS   2017   [facility]              
 
              ABSCESS OF                HEALTH              
 
      FACE                 SOLUTIONS              
 
                IN            
 
              
 
         UNSPECIFIED  2017   [facility]             
 
               OTITIS                N EMERGENCY             
 
      EXTERNA            SERVI                  
 
  LEFT EAR                    
 
                   
 
         
 
 I10          ESSENTIAL   2017   [facility]             
 
              PRIMARY                N EMERGENCY             
 
      HYPERTENSIO           SERVI                  
 
  N                           
 
                
 
 [id number]         OTHER   2016   [facility]              
 
      AND           URGENT              
 
  IDIOPATHIC    TREAT         
 
  NEUROPATHIE               
 
  S               
 
             
 
 M542         CERVICALGIA  2016   [facility]              
 
                       URGENT              
 
    TREAT         
 
                
 
      
 
         ACUTE   2016   [facility]              
 
              SINUSITIS                [facility]              
 
      UNSPECIFIED          URGENT              
 
                TREAT         
 
                        
 
      
 
 J028         ACUTE   2016   [facility]              
 
              PHARYNGITIS               [facility]              
 
       DUE TO           URGENT              
 
  OTHER SPEC    TREAT         
 
  ORGANISMS                 
 
                  
 
          
 
 N951         MENOPAUSAL   2016   [facility]              
 
              AND FEMALE                [facility]               
 
      CLIMACTERIC                                  
 
   STATES               
 
                
 
        
 
 [id number]       ENCOUNTER   2016   [facility]              
 
              GYN EXAM                LUCÍA ZAMOR[facility]               
 
      GENERAL RTN                                  
 
   W/O            
 
  ABNORMAL    
 
  FIND          
 
                
 
 [id number]       PAIN IN   2015   [facility]              
 
              RIGHT KNEE                PHYSICIANS              
 
                           GROUP                   
 
                         
 
      
 
         INTERVERTEB  2015   [facility]              
 
              RAL DISC                PHYSICIANS              
 
      D/O           GROUP                   
 
  W/RADICULOP                 
 
  ATHY LUMB      
 
  RGN           
 
               
 
 Z720         TOBACCO USE  2015   [facility]              
 
                                        PHYSICIANS              
 
                       GROUP                   
 
                  
 
      
 
 [id number]        ASTHMA,   2015   [facility]              
 
              UNSPECIFIED               PHYSICIANS              
 
      ,           GROUP                   
 
  UNSPECIFIED                 
 
   STATUS         
 
                
 
        
 
 7242         LUMBAGO      2015   [facility]              
 
                                        PHYSICIANS              
 
                   GROUP                   
 
                  
 
      
 
 V571         OTHER   04-   [facility]              
 
              PHYSICAL                [facility]              
 
      THERAPY              INC                     
 
                             
 
        
 
 220          BENIGN   2015   [facility]              
 
              NEOPLASM OF               PHYSICIANS              
 
       OVARY               GROUP                   
 
                              
 
         
 
 6146         PELVIC   2015   [facility]              
 
              PERITONEAL                LLC                     
 
      ADHESIONS,                                  
 
  FEMALE          
 
                
 
       
 
 6259         UNSPEC   2015   [facility]              
 
              SYMPTOM                PHYSICIANS              
 
      ASSOC           GROUP                   
 
  W/FEMALE                  
 
  GENITAL      
 
  ORGANS        
 
                
 
       
 
 [id number]        ABDOMINAL   2015   [facility]              
 
              PAIN,                ANESTH OF              
 
      UNSPECIFIED          THE BLUE                
 
   SITE                       
 
                     
 
      
 
 31093        UNSPECIFIED  2015   [facility]              
 
               VAGINITIS                [facility]A [facility]               
 
      AND                                   
 
  VULVOVAGINI           
 
  TIS           
 
               
 
 6258         OT SPEC   2015   [facility]              
 
              SYMPTOM                [facility]              
 
      ASSOC           INC                     
 
  W/FEMALE                 
 
  GENITAL    
 
  ORGANS        
 
                
 
       
 
         PRE-PROCEDU  2015   [facility]              
 
              RAL                [facility]              
 
      LABORATORY           INC                     
 
  EXAMINATION                
 
                
 
            
 
 0794         HUMAN   02-   [facility]              
 
              PAPILLOMA                LLC                     
 
      VIRUS IN                                  
 
  CCE & UNS      
 
  SITE          
 
                
 
 40928        MILD   02-   [facility]              
 
              DYSPLASIA                LLC                     
 
      OF CERVIX                                   
 
                  
 
          
 
 6268         OTH D/O   02-   [facility]              
 
              MENSTRUATIO               [facility]              
 
      N&OTH ABN           INC                     
 
  BLEED FE                 
 
  GNT TRACT     
 
                
 
          
 
 6269         UNS D/O   02-   [facility]              
 
              MENSTRUATIO               ANESTH OF              
 
      N&OTH ABN           THE BLUE                
 
  BLEED FE                  
 
  GNT TRACT          
 
                
 
          
 
 6262         EXCESSIVE   2015   [facility]              
 
              OR FREQUENT               [facility]              
 
                 INC                     
 
  MENSTRUATIO                
 
  N             
 
             
 
         OTHER   2015   [facility]              
 
              SPECIFIED                [facility]              
 
      PRE-OPERATI          INC                     
 
  VE                 
 
  EXAMINATION   
 
                
 
            
 
 7862         COUGH        2015   [facility]              
 
                                        [facility]               
 
                                         
 
            
 
 6202         OTHER AND   2015   [facility]              
 
              UNSPECIFIED               [facility]              
 
       OVARIAN           INC                     
 
  CYST                       
 
                
 
         ROUTINE   2015   [facility] CASTREJ[facility] MD               
 
      AL                                   
 
  EXAMINATION           
 
                
 
            
 
 22718        CHEST PAIN   2014   [facility]              
 
              UNSPECIFIED               [facility]              
 
                           INC                     
 
                         
 
 6823         CELLULITIS   2014   [facility]              
 
              AND ABSCESS               PHYSICIANS              
 
       OF UPPER           GROUP                   
 
  ARM AND                  
 
  FOREARM         
 
                
 
        
 
 6826         CELLULITIS   2014   [facility]             
 
              AND ABSCESS               N EMERGENCY             
 
       OF LEG            PHYSI                  
 
  EXCEPT FOOT                 
 
                   
 
            
 
 7802         SYNCOPE AND  2014   [facility]             
 
               COLLAPSE                 N EMERGENCY             
 
                            PHYSI                  
 
                        
 
       
 
 6829         CELLULITIS   10-   [facility]              
 
              AND ABSCESS               PHYSICIANS              
 
       OF           GROUP                   
 
  UNSPECIFIED                 
 
   SITE           
 
                
 
      
 
 3829         UNSPECIFIED  2014   [facility]              
 
               OTITIS                PHYSICIANS              
 
      MEDIA                GROUP                   
 
                              
 
        
 
 24336        OTHER   2014   [facility]              
 
              MALAISE AND               [facility]              
 
       FATIGUE             INC                     
 
                             
 
         
 
 25299        CONTACT   2014   [facility]             
 
              DERMATITIS&               N EMERGENCY             
 
      OTHER            PHYSI                  
 
  ECZEMA DUE                  
 
  TO SUNBURN       
 
                
 
           
 
 3540         CARPAL   2010   [facility]              
 
              TUNNEL                [facility]               
 
    SYNDROME                                     
 
                        
 
         
 
 18600        OTHER   2010   [facility]              
 
              TENOSYNOVIT               [facility]               
 
    IS OF HAND                                   
 
  AND WRIST             
 
                
 
          
 
 4660         ACUTE   2010   [facility]              
 
              BRONCHITIS                B                       
 
                                              
 
             
 
 7821         RASH AND   2010   [facility]              
 
              OTHER                B                       
 
    NONSPECIFIC                               
 
   SKIN      
 
  ERUPTION      
 
                
 
         
 
 7241         PAIN IN   04-   [facility]             
 
              THORACIC                N EMERGENCY             
 
    SPINE                 PHYS INC               
 
                              
 
            
 
 11648        ACUTE   2010   [facility],              
 
              DERMATITIS                FR[facility]               
 
    DUE TO                                   
 
  SOLAR            
 
  RADIATION     
 
                
 
          
 
 6926         CONTACT   2010   [facility]             
 
              DERMATITIS&               N EMERGENCY             
 
    OTHER            PHYS INC               
 
  ECZEMA DUE                  
 
  TO PLANTS           
 
                
 
          
 
 3670         HYPERMETROP  2010   [facility]               
 
                                               
 
            
 
 2724         OTHER AND   2010   [facility]              
 
              UNSPECIFIED               [facility]                
 
  HYPERLIPIDE                 
 
  AJ                
 
               
 
 2859         [facility]SPECIFIED  2009   [facility]              
 
               ANEMIA                   [facility]                
 
                      
 
         
 
 4739         UNSPECIFIED  2008   [facility]             
 
               SINUSITIS                N EMERGENCY             
 
                          PHYS INC               
 
                         
 
          
 
 5990         URINARY   2008   [facility]              
 
              TRACT                [facility]              
 
    INFECTION           INC                     
 
  SITE NOT                 
 
  SPECIFIED     
 
                
 
          
 
 60644        UNSPECIFIED  2008   [facility]                                        
 
                     
 
      
 
 95318        SWELLING OF  2008   [facility]              
 
               LIMB                     RADIOLOGY              
 
                         ASSOCIATES              
 
      PSC           
 
               
 
 5693         HEMORRHAGE   2008   [facility]              
 
              OF RECTUM                [facility]              
 
    AND ANUS             INC                     
 
                             
 
         
 
 84804        CALCU   2008   PATHOLOGY &             
 
              GALLBLADD                 CYTOLOGY              
 
    W/OTH           LAB                     
 
  CHOLECYST                 
 
  W/O MENTION   
 
   OBST         
 
                
 
      
 
 23920        CALCU   2008   NAUN MUKHERJEE JR                JEAN-PAUL F               
 
    W/O MENTION                                  
 
              
 
  CHOLECYST/O   
 
  BST           
 
               
 
 90300        ACUTE AND   2008   [facility] CA[facility] MD               
 
    CHOLECYSTIT                                  
 
  IS                    
 
              
 
 5753         HYDROPS OF   2008   [facility]              
 
              GALLBLADDER               [facility]              
 
                         INC                     
 
                         
 
 6141         CHRONIC   2008   [facility]              
 
              SALPINGITIS               [facility]              
 
     AND           INC                     
 
  OOPHORITIS                 
 
                
 
           
 
 6142         SALPINGITIS  2008   PATHOLOGY &             
 
              &OOPHORITIS                CYTOLOGY              
 
     NOT ACUT           LAB                     
 
  SUBACUT/CHR                
 
  N             
 
             
 
 6200         FOLLICULAR   2008   [facility] &             
 
              CYST OF                 CYTOLOGY              
 
    OVARY                LAB                     
 
                             
 
      
 
 6201         CORPUS   2008   PATHOLOG[facility] &             
 
              LUTEUM CYST                CYTOLOGY              
 
     OR           LAB                     
 
  HEMATOMA                   
 
                
 
         
 
 6210         POLYP OF   2008   [facility]              
 
              CORPUS                [facility]              
 
    UTERI                INC                     
 
                             
 
      
 
 56595        HEMORRHAGE   2008   ALLAN MUKHERJEE JR                                   
 
  PROCEDURE            
 
  NEC           
 
               
 
         LAPAROSCOPI  2008   [facility] JR               
 
    PROC                                   
 
  COVERTED            
 
  OPEN PROC     
 
                
 
          
 
         SPECIAL SCR  2008   [facility]              
 
                              [place] [facility]                  
 
    EXAMINATION                                  
 
   OTH SPEC         
 
  CHLAMYDIAL    
 
  DZ            
 
              
 
 V745         SCREENING   2008   [facility]              
 
              EXAMINATION               [facility]                  
 
     FOR                                   
 
  VENEREAL         
 
  DISEASE       
 
                
 
        
 
 V762         SCREENING   2008   [facility]              
 
              FOR                [place] [facility]                  
 
    MALIGNANT                                   
 
  NEOPLASM OF        
 
   THE CERVIX   
 
                
 
            
 
 V780         SCREENING   2008   [facility]              
 
              FOR IRON                [facility]               
 
    DEFICIENCY                                   
 
  ANEMIA                
 
                
 
       
 
                                                                                
                                                                                
                                                                                
                                                                                
                                                                                
                                                                                
                                                                                
                                                                                
                                                                                
                                                                                
                                                          
 
Medications
                      
 
 
 Na  ND  Rx  Da  Fi  Fi  Am  Da  Di  Ph  RX  Ph  St
 
 me  C   No  te  ll  ll  ou  ys  ag  ar   #  ys  at
 
         rm        s   nt      no  ma      ic  us
 
             Or  Da              si  cy      ia    
 
             de  te              s           n     
 
             re                                    
 
             d                                     
 
                                                   
 
                                                   
 
                                                   
 
                                                  
 
                                   
 
                           
 
                      
 
               
 
              
 
 TI  60      09  09      30  30          00  CA  Ac
 
 ZA  50      -0  -2      .0              00  RL  ti
 
 NI  50      5-  9-      00              00  IS  ve
 
 DI  25      20  20                      77  LE    
 
 NE  20      17  17                      86       
 
    2                                   69  DR    
 
 HC                                          UG    
 
 L                                           S     
 
 4                                                 
 
 MG                                               
 
                                            
 
 TA                                       
 
 BL                                    
 
 ET                                    
 
                                
 
                           
 
                          
 
                        
 
               
 
               
 
               
 
               
 
               
 
 GE  24      08  09      5.  5           00  CA  Ac
 
 NT  20      -2  -2      00              00  RL  ti
 
 AM  80      9-  2-      0               00  IS  ve
 
 IC  58      20  20                      77  LE    
 
 IN  06      17  17                      99       
 
    0                                   88  DR    
 
 0.                                          UG    
 
 3%                                          S     
 
                                                  
 
 EY                                              
 
 E                                           
 
 DR                                      
 
 OP                                    
 
 S                                     
 
                                
 
                           
 
                          
 
                        
 
               
 
               
 
               
 
               
 
              
 
 TI  60      08  08      30  30          00  CA  Ac
 
 ZA  50      -0  -2      .0              00  RL  ti
 
 NI  50      2-  5-      00              00  IS  ve
 
 DI  25      20  20                      77  LE    
 
 NE  20      17  17                      86       
 
    2                                   69  DR LEE                                          UG    
 
 L                                           S     
 
 4                                                 
 
 MG                                               
 
                                            
 
 TA                                       
 
 BL                                    
 
 ET                                    
 
                                
 
                           
 
                          
 
                        
 
               
 
               
 
               
 
               
 
               
 
 TI  57      06  07      60  30          00  CA  Ac
 
 ZA  66      -2  -2      .0              00  RL  ti
 
 NI  40      9-  8-      00              00  IS  ve
 
 DI  50      20  20                      77  LE    
 
 NE  31      17  17                      48       
 
    8                                   46  DR    
 
 HC                                          UG    
 
 L                                           S     
 
 4                                                 
 
 MG                                               
 
                                            
 
 TA                                       
 
 BL                                    
 
 ET                                    
 
                                
 
                           
 
                          
 
                        
 
               
 
               
 
               
 
               
 
               
 
 TI  57      05  06      60  30          00  CA  Ac
 
 ZA  66      -2  -1      .0              00  RL  ti
 
 NI  40      4-  6-      00              00  IS  ve
 
 DI  50      20  20                      77  LE    
 
 NE  31      17  17                      48       
 
    8                                   46  DR    
 
 HC                                          UG    
 
 L                                           S     
 
 4                                                 
 
 MG                                               
 
                                            
 
 TA                                       
 
 BL                                    
 
 ET                                    
 
                                
 
                           
 
                          
 
                        
 
               
 
               
 
               
 
               
 
               
 
 VE  00      05  06      18  16          00  CA  Ac
 
 NT  17      -2  -1      .0              00  RL  ti
 
 OL  30      4-  6-      00              00  IS  ve
 
 IN  68      20  20                      77  LE    
 
    22      17  17                      48       
 
 HF  0                                   47  DR    
 
 A                                           UG    
 
 90                                          S     
 
                                                  
 
 MC                                               
 
 G                                           
 
 IN                                       
 
 HA                                    
 
 LE                                    
 
 R                              
 
                           
 
                          
 
                        
 
               
 
               
 
               
 
               
 
               
 
              
 
 AC  00      05  05      60  30          00  CA  Ac
 
 ET  09      -0  -2      .0              00  RL  ti
 
 AM  30      2-  6-      00              00  IS  ve
 
 IN  15      20  20                      77  LE    
 
 OP  00      17  17                      42       
 
 HE  1                                   47  DR    
 
 N-                                          UG    
 
 CO                                          S     
 
 D                                                 
 
 #3                                               
 
                                            
 
 TA                                       
 
 BL                                    
 
 ET                                    
 
                                
 
                           
 
                          
 
                        
 
               
 
               
 
               
 
               
 
               
 
 NJ  00      04  05      55  10          00  SO  Ac
 
 ED  05      -2  -2      .0              00  PE  ti
 
 NI  44      7-  6-      00              00  RS  ve
 
 SO  72      20  20                      56       
 
 NE  83      17  17                      24  FA    
 
  5  1                                   57  MI    
 
                                            LY    
 
 MG                                               
 
                                            DR    
 
 TA                                         UG    
 
 BL                                          
 
 ET                                       
 
                                       
 
                                       
 
                                
 
                           
 
                           
 
                           
 
                  
 
               
 
               
 
 TI  57      04  05      60  30          00  SO  Ac
 
 ZA  66      -2  -1      .0              00  PE  ti
 
 NI  40      4-  9-      00              00  RS  ve
 
 DI  50      20  20                      55       
 
 NE  31      17  17                      37  FA    
 
    8                                   39  MI    
 
 HC                                          LY    
 
 L                                                
 
 4                                           DR    
 
 MG                                         UG    
 
                                            
 
 TA                                       
 
 BL                                    
 
 ET                                    
 
                                
 
                           
 
                           
 
                           
 
                  
 
               
 
               
 
               
 
               
 
 CE  69      04  05      30  30          00  SO  Ac
 
 LE  09      -1  -1      .0              00  PE  ti
 
 CO  70      9-  2-      00              00  RS  ve
 
 XI  42      20  20                      55       
 
 B   20      17  17                      56  FA    
 
 10  7                                   40  MI    
 
 0                                           LY    
 
 MG                                               
 
                                            DR    
 
 CA                                         UG    
 
 PS                                          
 
 UL                                       
 
 E                                     
 
                                       
 
                                
 
                           
 
                           
 
                           
 
                  
 
               
 
               
 
              
 
 VE  00      03  04      18  20          00  SO  Ac
 
 NT  17      -2  -1      .0              00  PE  ti
 
 OL  30      0-  4-      00              00  RS  ve
 
 IN  68      20  20                      55       
 
    22      17  17                      37  FA    
 
 HF  0                                   40  MI    
 
 A                                           LY    
 
 90                                               
 
                                            DR    
 
 MC                                         UG    
 
 G                                           
 
 IN                                       
 
 HA                                    
 
 LE                                    
 
 R                              
 
                           
 
                           
 
                           
 
                  
 
               
 
               
 
               
 
               
 
              
 
 TI  57      03  04      60  30          00  SO  Ac
 
 ZA  66      -2  -1      .0              00  PE  ti
 
 NI  40      2-  4-      00              00  RS  ve
 
 DI  50      20  20                      55       
 
 NE  31      17  17                      37  FA    
 
    8                                   39  MI    
 
 HC                                          LY    
 
 L                                                
 
 4                                           DR    
 
 MG                                         UG    
 
                                            
 
 TA                                       
 
 BL                                    
 
 ET                                    
 
                                
 
                           
 
                           
 
                           
 
                  
 
               
 
               
 
               
 
               
 
 CI  16      03  04      20  10          00  SO  Ac
 
 NJ  57      -2  -1      .0              00  PE  ti
 
 OF  10      2-  4-      00              00  RS  ve
 
 LO  41      20  20                      55       
 
 XA  25      17  17                      95  FA    
 
 CI  0                                   47  MI    
 
 N                                           LY    
 
 HC                                               
 
 L                                           DR    
 
 50                                         UG    
 
 0                                           
 
 MG                                       
 
                                      
 
 TA                                    
 
 B                              
 
                           
 
                           
 
                           
 
                  
 
               
 
               
 
               
 
               
 
              
 
 CE  69      03  04      30  30          00  SO  Ac
 
 LE  09      -1  -0      .0              00  PE  ti
 
 CO  70      5-  7-      00              00  RS  ve
 
 XI  42      20  20                      55       
 
 B   20      17  17                      56  FA    
 
 10  7                                   40  MI    
 
 0                                           LY    
 
 MG                                               
 
                                            DR    
 
 CA                                         UG    
 
 PS                                          
 
 UL                                       
 
 E                                     
 
                                       
 
                                
 
                           
 
                           
 
                           
 
                  
 
               
 
               
 
              
 
 CL  63      03  03      40  10          00  SO  Ac
 
 IN  30      -0  -3      .0              00  PE  ti
 
 DA  40      8-  1-      00              00  RS  ve
 
 MY  69      20  20                      55       
 
 CI  20      17  17                      82  FA    
 
 N   1                                   70  MI    
 
 HC                                          LY    
 
 L                                                
 
 15                                          DR    
 
 0                                          UG    
 
 MG                                          
 
                                         
 
 CA                                    
 
 PS                                    
 
 UL                             
 
 E                         
 
                           
 
                           
 
                  
 
               
 
               
 
               
 
               
 
               
 
              
 
 TI  57      02  03      60  30          00  SO  Ac
 
 ZA  66      -2  -1      .0              00  PE  ti
 
 NI  40      0-  7-      00              00  RS  ve
 
 DI  50      20  20                      55       
 
 NE  31      17  17                      37  FA    
 
    8                                   39  MI    
 
 HC                                          LY    
 
 L                                                
 
 4                                           DR    
 
 MG                                         UG    
 
                                            
 
 TA                                       
 
 BL                                    
 
 ET                                    
 
                                
 
                           
 
                           
 
                           
 
                  
 
               
 
               
 
               
 
               
 
 PA  00      02  03      30  30          00  SO  Ac
 
 RO  37      -0  -0      .0              00  PE  ti
 
 XE  87      8-  3-      00              00  RS  ve
 
 TI  00      20  20                      55       
 
 NE  29      17  17                      56  FA    
 
    3                                   39  MI    
 
 HC                                          LY    
 
 L                                                
 
 20                                          DR    
 
                                           UG    
 
 MG                                          
 
                                         
 
 TA                                    
 
 BL                                    
 
 ET                             
 
                           
 
                           
 
                           
 
                  
 
               
 
               
 
               
 
               
 
               
 
 CE  69      02  03      30  30          00  SO  Ac
 
 LE  09      -0  -0      .0              00  PE  ti
 
 CO  70      8-  3-      00              00  RS  ve
 
 XI  42      20  20                      55       
 
 B   20      17  17                      56  FA    
 
 10  7                                   40  MI    
 
 0                                           LY    
 
 MG                                               
 
                                            DR    
 
 CA                                         UG    
 
 PS                                          
 
 UL                                       
 
 E                                     
 
                                       
 
                                
 
                           
 
                           
 
                           
 
                  
 
               
 
               
 
              
 
 HY  00      02  03      60  30          00  SO  Ac
 
 DR  18      -0  -0      .0              00  PE  ti
 
 OX  50      8-  3-      00              00  RS  ve
 
 YZ  67      20  20                      55       
 
 IN  40      17  17                      56  FA    
 
 E   5                                   41  MI    
 
 PA                                          LY    
 
 M                                                
 
 25                                          DR    
 
                                           UG    
 
 MG                                          
 
                                         
 
 CA                                    
 
 P                                     
 
                                
 
                           
 
                           
 
                           
 
                  
 
               
 
               
 
               
 
              
 
 TI  57      01  02      60  30          00  SO  Ac
 
 ZA  66      -1  -1      .0              00  PE  ti
 
 NI  40      6-  0-      00              00  RS  ve
 
 DI  50      20  20                      55       
 
 NE  31      17  17                      37  FA    
 
    8                                   39  MI    
 
 HC                                          LY    
 
 L                                                
 
 4                                           DR    
 
 MG                                         UG    
 
                                            
 
 TA                                       
 
 BL                                    
 
 ET                                    
 
                                
 
                           
 
                           
 
                           
 
                  
 
               
 
               
 
               
 
               
 
 VE  00      01  02      18  20          00  SO  Ac
 
 NT  17      -1  -1      .0              00  PE  ti
 
 OL  30      6-  0-      00              00  RS  ve
 
 IN  68      20  20                      55       
 
    22      17  17                      37  FA    
 
 HF  0                                   40  MI    
 
 A                                           LY    
 
 90                                               
 
                                            DR    
 
 MC                                         UG    
 
 G                                           
 
 IN                                       
 
 HA                                    
 
 LE                                    
 
 R                              
 
                           
 
                           
 
                           
 
                  
 
               
 
               
 
               
 
               
 
              
 
 DI  16      01  02      60  30          00  SO  Ac
 
 CL  57      -1  -1      .0              00  PE  ti
 
 OF  10      6-  0-      00              00  RS  ve
 
 EN  20      20  20                      55       
 
 AC  11      17  17                      37  FA    
 
    1                                   41  MI    
 
 SO                                          LY    
 
 D                                                
 
 EC                                          DR    
 
                                           UG    
 
 75                                          
 
                                         
 
 MG                                    
 
                                      
 
 TA                             
 
 B                         
 
                           
 
                           
 
                  
 
               
 
               
 
               
 
               
 
               
 
              
 
 AM  16      01  02      30  30          00  SO  Ac
 
 IT  72      -1  -1      .0              00  PE  ti
 
 RI  90      6-  0-      00              00  RS  ve
 
 PT  17      20  20                      55       
 
 YL  30      17  17                      37  FA    
 
 IN  1                                   42  MI    
 
 E                                           LY    
 
 HC                                               
 
 L                                           DR    
 
 50                                         UG    
 
                                            
 
 MG                                       
 
                                      
 
 TA                                    
 
 B                              
 
                           
 
                           
 
                           
 
                  
 
               
 
               
 
               
 
               
 
              
 
 ES  00      01  02      30  30          00  SO  Ac
 
 TR  55      -1  -1      .0              00  PE  ti
 
 AD  50      8-  0-      00              00  RS  ve
 
 IO  88      20  20                      55       
 
 L   60      17  17                      39  FA    
 
 1   2                                   06  MI    
 
 MG                                          LY    
 
                                                 
 
 TA                                          DR    
 
 BL                                         UG    
 
 ET                                          
 
                                          
 
                                       
 
                                       
 
                                
 
                           
 
                           
 
                           
 
                  
 
               
 
 ME  00      07  07  0   21  6       WA  70  WE  Ac
 
 TH  60      -2  -2      .0          L-  41  ST  ti
 
 YL  34      8-  8-      00          MA  64     ve
 
 NJ  59      20  20                  RT  7   RI    
 
 ED  31      10  10                         CH    
 
 NI  5                               PH      AR    
 
 SO                                  AR      D     
 
 LO                                  MA      M     
 
 NE                                  CY            
 
  4                                #            
 
                                        
 
 MG                         10         
 
                        04        
 
 DO                      93      
 
 SE                       
 
 PK                     
 
                       
 
                     
 
                  
 
                  
 
                  
 
                  
 
                  
 
               
 
               
 
     00      07  07  0   20  6       CA  67  WE  Ac
 
     59      -0  -0      .0          RL  06  ST  ti
 
     10      9-  9          IS  75     ve
 
     34      20  20                  LE      RI    
 
     90      10  10                         CH    
 
     5                               DR      AR    
 
                                     UG      D     
 
                                            M     
 
                                     CO            
 
                                  MP            
 
                            AN            
 
                            Y          
 
                                 
 
                                 
 
                          
 
                        
 
                       
 
                     
 
               
 
               
 
              
 
 ME  00      07  07  0   21  6       CA  67  WE  Ac
 
 TH  78      -0  -0      .0          RL  06  ST  ti
 
 YL  15        9      00          IS  76     ve
 
 NJ  02      20  20                  LE      RI    
 
 ED  20      10  10                         CH    
 
 NI  7                               DR      AR    
 
 SO                                  UG      D     
 
 LO                                         M     
 
 NE                                  CO            
 
  4                               MP            
 
                           AN            
 
 MG                         Y          
 
                                
 
 DO                              
 
 SE                       
 
 PK                     
 
                       
 
                     
 
                  
 
                  
 
                 
 
               
 
               
 
               
 
               
 
 NA  00      07  07  1   60  30      CA  67  WE  Ac
 
 NJ  09      -0  -0      .0          RL  06  ST  ti
 
 OX  30      9  9      00          IS  77     ve
 
 EN  14      20  20                  LE      RI    
 
    90      10  10                         CH    
 
 50  5                               DR      AR    
 
 0                                   UG      D     
 
 MG                                         M     
 
                                    CO            
 
 TA                                MP            
 
 BL                         AN            
 
 ET                         Y          
 
                                 
 
                                 
 
                          
 
                        
 
                       
 
                     
 
                  
 
                  
 
                 
 
 AZ  59      04  04      6.  5       RI  39  WE  Ac
 
 IT  76      -3  -3      00          TE  97  ST  ti
 
 HR  23      0-  0-      0              97     ve
 
 OM  06      20  20                  AI      RY    
 
 YC  00      10  10                  D       AN    
 
 IN  1                               PH       B    
 
                                    AR            
 
 25                                  MA            
 
 0                                   CY            
 
 MG                                             
 
                              03            
 
 TA                           91         
 
 BL                         4       
 
 ET                         #       
 
                     03      
 
                   91   
 
                        
 
                        
 
                  
 
                  
 
                  
 
                  
 
                  
 
               
 
               
 
 MU  00      04  04      22  10      RI  39  WE  Ac
 
 PI  09      -3  -3      .0          TE  97  ST  ti
 
 RO  31      0-  0-      00             98     ve
 
 CI  01      20  20                  AI      RY    
 
 N   04      10  10                  D       AN    
 
 2%  2                               PH       B    
 
                                    AR            
 
 OI                                  MA            
 
 NT                                  CY            
 
 ME                                              
 
 NT                            03            
 
                               91         
 
                            4       
 
                            #       
 
                     03      
 
                   91   
 
                        
 
                        
 
                  
 
                  
 
               
 
               
 
               
 
               
 
               
 
 LO  00      04  04      10  10      RI  39  AH  Ac
 
 RA  78      -0  -0      .0          TE  72  ME  ti
 
 TA  15      5-  6-      00             56  D   ve
 
 DI  07      20  20                  AI      MU    
 
 NE  70      10  10                  D       HA    
 
    1                               PH      MM    
 
 10                                  AR      AD    
 
                                    MA       A    
 
 MG                                  CY            
 
                                                
 
 TA                            03            
 
 BL                            91         
 
 ET                         4       
 
                            #       
 
                     03      
 
                   91      
 
                           
 
                        
 
                  
 
                  
 
                  
 
                  
 
               
 
               
 
               
 
 NJ  00      04  04      42  12      RI  39  AH  Ac
 
 ED  60      -0  -0      .0          TE  72  ME  ti
 
 NI  35      6-  6-      00             57  D   ve
 
 SO  33      20  20                  AI      MU    
 
 NE  82      10  10                  D       HA    
 
    1                               PH      MM    
 
 10                                  AR      AD    
 
                                    MA       A    
 
 MG                                  CY            
 
                                                
 
 TA                            03            
 
 BL                            91         
 
 ET                         4       
 
                            #       
 
                     03      
 
                   91      
 
                           
 
                        
 
                  
 
                  
 
                  
 
                  
 
               
 
               
 
               
 
 HY  68      04  04      24  6       RI  39  AH  Ac
 
 DR  46      -0  -0      .0          TE  72  ME  ti
 
 OX  20      5-  6-      00             59  D   ve
 
 YZ  36      20  20                  AI      MU    
 
 IN  10      10  10                  D       SWANN    
 
 E   1                               PH      MM    
 
 HC                                  AR      AD    
 
 L                                   MA       A    
 
 25                                  CY            
 
                                               
 
 MG                            03            
 
                              91         
 
 TA                         4       
 
 BL                         #       
 
 ET                  03      
 
                   91      
 
                           
 
                        
 
                  
 
                  
 
                  
 
                  
 
                  
 
                  
 
               
 
 FA  00      04  04      30  15      RI  39  AH  Ac
 
 MO  17      -0  -0      .0          TE  72  ME  ti
 
 TI  25      5-  6-      00             61  D   ve
 
 DI  72      20  20                  AI      MU    
 
 NE  86      10  10                  D       SWANN    
 
    0                               PH      MM    
 
 20                                  AR      AD    
 
                                    MA       A    
 
 MG                                  CY            
 
                                                
 
 TA                            03            
 
 BL                            91         
 
 ET                         4       
 
                            #       
 
                     03      
 
                   91      
 
                           
 
                        
 
                  
 
                  
 
                  
 
                  
 
               
 
               
 
               
 
 PE  00      03  03      59  2       RI  39  WE  Ac
 
 RM  47      -1  -1      .0          TE  50  ST  ti
 
 ET  25      6-  6-      00             39     ve
 
 HR  24      20  20                  AI      RI    
 
 IN  26      10  10                  D       CH    
 
    7                               PH      AR    
 
 1%                                  AR      D     
 
                                    MA      M     
 
 LO                                  CY            
 
 TI                                             
 
 ON                            03            
 
                               91         
 
                            4       
 
                            #       
 
                     03      
 
                   91      
 
                          
 
                        
 
                  
 
                  
 
               
 
               
 
               
 
               
 
               
 
 OX  00      02  02  00  15  3       RI  82  RU  Ac
 
 YC  40      -1  -2      .0          TE  15  SH  ti
 
 OD  60      6-  6-      00             74     ve
 
 ON  52      20  20                  AI      NE    
 
 -A  20      10  10                  D       IL    
 
 CE  1                               PH       C    
 
 TA                                  AR            
 
 MI                                  M             
 
 NO                                  #3            
 
 PH                                93            
 
 EN                         8             
 
                                      
 
 7.                              
 
 5-                              
 
 32                       
 
 5                   
 
                     
 
                     
 
                  
 
                 
 
               
 
               
 
               
 
               
 
              
 
     00      01  01  00  12  3       RI  38  RU  Ac
 
     40      -1  -2      .0          TE  86  SH  ti
 
     60      6-  8-      00             05     ve
 
     35      20  20                  AI      NE    
 
     70      10  10                  D       IL    
 
     5                               PH       C    
 
                                     AR            
 
                                     M             
 
                                     #3            
 
                                   91            
 
                            4             
 
                                       
 
                                 
 
                                 
 
                          
 
                     
 
                     
 
                     
 
               
 
              
 
     00      01  01  00  18  3       RI  81  RU  Ac
 
     40      -1  -2      .0          TE  69  SH  ti
 
     60      3-  8-      00             58     ve
 
     35      20  20                  AI      NE    
 
     70      10  10                  D       IL    
 
     5                               PH       C    
 
                                     AR            
 
                                     M             
 
                                     #3            
 
                                   93            
 
                            8             
 
                                       
 
                                 
 
                                 
 
                          
 
                     
 
                     
 
                     
 
               
 
              
 
 PE  00      01  01  00  40  10      RI  38  RU  Ac
 
 NI  09      -1  -2      .0          TE  86  SH  ti
 
 CI  31      5-  8-      00             02     ve
 
 LL  17      20  20                  AI      NE    
 
 IN  20      10  10                  D       IL    
 
    1                               PH       C    
 
 VK                                  AR            
 
                                    M             
 
 25                                  #3            
 
 0                                  91            
 
 MG                         4             
 
                                      
 
 TA                              
 
 BL                              
 
 ET                       
 
                     
 
                     
 
                     
 
                  
 
                 
 
               
 
               
 
               
 
               
 
     00      12  12  00  15  3       RI  81  RU  Ac
 
     40      -0  -1      .0          TE  12  SH  ti
 
     60      2-  7-      00             93     ve
 
     35      20  20                  AI      NE    
 
     70      09  09                  D       IL    
 
     5                               PH       C    
 
                                     AR            
 
                                     M             
 
                                     #3            
 
                                   93            
 
                            8             
 
                                       
 
                                 
 
                                 
 
                          
 
                     
 
                     
 
                     
 
               
 
              
 
     00      12  12  00  10  3       RI  38  WE  Ac
 
     40      -0  -1      .0          TE  46  ST  ti
 
     60      8-  7-      00             10     ve
 
     35      20  20                  AI      RI    
 
     70      09  09                  D       CH    
 
     5                               PH      AR    
 
                                     AR      D     
 
                                     M       M     
 
                                     #3            
 
                                   91            
 
                            4             
 
                                       
 
                                 
 
                                 
 
                          
 
                        
 
                       
 
                     
 
               
 
              
 
 PE  00      12  12  00  59  2       RI  38  WE  Ac
 
 RM  47      -0  -1      .0          TE  46  ST  ti
 
 ET  25      9-  7-      00             41     ve
 
 HR  24      20  20                  AI      RI    
 
 IN  26      09  09                  D       CH    
 
    7                               PH      AR    
 
 1%                                  AR      D     
 
                                    M       M     
 
 LO                                  #3            
 
 TI                                91            
 
 ON                         4             
 
                                       
 
                                 
 
                                 
 
                          
 
                        
 
                       
 
                     
 
                  
 
                 
 
 CY  00      12  12  00  20  6       RI  38  WE  Ac
 
 CL  37      -0  -1      .0          TE  46  ST  ti
 
 OB  80      8-  7-      00             11     ve
 
 EN  75      20  20                  AI      RI    
 
 ZA  11      09  09                  D       CH    
 
 NJ  0                               PH      AR    
 
 IN                                  AR      D     
 
 E                                   M       M     
 
 10                                  #3            
 
                                  91            
 
 MG                         4             
 
                                      
 
 TA                              
 
 BL                              
 
 ET                       
 
                        
 
                       
 
                     
 
                  
 
                 
 
               
 
               
 
               
 
               
 
 PE  00      11  12  00  59  5       RI  38  WE  Ac
 
 RM  47      -1  -0      .0          TE  22  ST  ti
 
 ET  25      7-  3-      00             77     ve
 
 HR  24      20  20                  AI      RI    
 
 IN  26      09  09                  D       CH    
 
    7                               PH      AR    
 
 1%                                  AR      D     
 
                                    M       M     
 
 LO                                  #3            
 
 TI                                91            
 
 ON                         4             
 
                                       
 
                                 
 
                                 
 
                          
 
                        
 
                       
 
                     
 
                  
 
                 
 
 PE  00      11  11  00  40  10      RI  38  RU  Ac
 
 NI  09      -0  -1      .0          TE  05  SH  ti
 
 CI  31      3-  9-      00             63     ve
 
 LL  17      20  20                  AI      NE    
 
 IN  20      09  09                  D       IL    
 
    1                               PH       C    
 
 VK                                  AR            
 
                                    M             
 
 25                                  #3            
 
 0                                  91            
 
 MG                         4             
 
                                      
 
 TA                              
 
 BL                              
 
 ET                       
 
                     
 
                     
 
                     
 
                  
 
                 
 
               
 
               
 
               
 
               
 
     00      11  11  00  16  4       RI  80  RU  Ac
 
     40      -1  -1      .0          TE  79  SH  ti
 
     60      0-  9-      00             37     ve
 
     35      20  20                  AI      NE    
 
     70      09  09                  D       IL    
 
     5                               PH       C    
 
                                     AR            
 
                                     M             
 
                                     #3            
 
                                   93            
 
                            8             
 
                                       
 
                                 
 
                                 
 
                          
 
                     
 
                     
 
                     
 
               
 
              
 
     00      11  11  00  15  3       RI  38  RU  Ac
 
     40      -0  -1      .0          TE  05  SH  ti
 
     60      3-  9-      00             64     ve
 
     35      20  20                  AI      NE    
 
     70      09  09                  D       IL    
 
     5                               PH       C    
 
                                     AR            
 
                                     M             
 
                                     #3            
 
                                   91            
 
                            4             
 
                                       
 
                                 
 
                                 
 
                          
 
                     
 
                     
 
                     
 
               
 
              
 
 OX  00      10  11  00  15  3       RI  80  RU  Ac
 
 YC  40      -2  -0      .0          TE  62  SH  ti
 
 OD  60      9-  5-      00             84     ve
 
 ON  51      20  20                  AI      NE    
 
 E-  20      09  09                  D       IL    
 
 AC  1                               PH       C    
 
 ET                                  AR            
 
 AM                                  M             
 
 IN                                  #3            
 
 OP                                93            
 
 HE                         8             
 
 N                                     
 
 5-                              
 
 32                              
 
 5                        
 
                     
 
                     
 
                     
 
                  
 
                 
 
               
 
               
 
               
 
              
 
     00      09  10  00  18  4       WA  44  RU  Ac
 
     40      -2  -0      .0          L-  76  SH  ti
 
     60      5-  8-      00          MA  47     ve
 
     35      20  20                  RT  6   NE    
 
     70      09  09                         IL    
 
     5                               PH       C    
 
                                     AR            
 
                                     MA            
 
                                     CY            
 
                                                
 
                            #4            
 
                            93         
 
                                   
 
                                 
 
                          
 
                     
 
                     
 
                     
 
               
 
               
 
               
 
 OX  00      09  10  00  12  3       RI  37  RU  Ac
 
 YC  40      -2  -0      .0          TE  68  SH  ti
 
 OD  60      8-  8-      00             32     ve
 
 ON  51      20  20                  AI      NE    
 
 E-  20      09  09                  D       IL    
 
 AC  1                               PH       C    
 
 ET                                  AR            
 
 AM                                  M             
 
 IN                                  #3            
 
 OP                                91            
 
 HE                         4             
 
 N                                     
 
 5-                              
 
 32                              
 
 5                        
 
                     
 
                     
 
                     
 
                  
 
                 
 
               
 
               
 
               
 
              
 
 OX  00      09  09  00  12  3       RI  79  RU  Ac
 
 YC  40      -0  -2      .0          TE  90  SH  ti
 
 OD  60      9-  4-      00             93     ve
 
 ON  51      20  20                  AI      NE    
 
 E-  20      09  09                  D       IL    
 
 AC  1                               PH       C    
 
 ET                                  AR            
 
 AM                                  M             
 
 IN                                  #3            
 
 OP                                93            
 
 HE                         8             
 
 N                                     
 
 5-                              
 
 32                              
 
 5                        
 
                     
 
                     
 
                     
 
                  
 
                 
 
               
 
               
 
               
 
              
 
 PE  00      09  09  00  40  10      RI  37  RU  Ac
 
 NI  09      -1  -2      .0          TE  49  SH  ti
 
 CI  31      1-  4-      00             20     ve
 
 LL  17      20  20                  AI      NE    
 
 IN  20      09  09                  D       IL    
 
    1                               PH       C    
 
 VK                                  AR            
 
                                    M             
 
 25                                  #3            
 
 0                                  91            
 
 MG                         4             
 
                                      
 
 TA                              
 
 BL                              
 
 ET                       
 
                     
 
                     
 
                     
 
                  
 
                 
 
               
 
               
 
               
 
               
 
 NJ  60      09  09  00  30  30      RI  37  WE  Ac
 
 EN  25      -1  -2      .0          TE  49  ST  ti
 
 AT  80      1-  4-      00             19     ve
 
 AL  19      20  20                  AI      RI    
 
    60      09  09                  D       CH    
 
 19  1                               PH      AR    
 
                                    AR      D     
 
 TA                                  M       M     
 
 BL                                  #3            
 
 ET                                 91            
 
                            4             
 
                                       
 
                                 
 
                                 
 
                          
 
                        
 
                       
 
                     
 
                  
 
              
 
     63      08  08  00  14  7       WA  69  GA  Ac
 
     30      -0  -2      .0          L-  83  IN  ti
 
     40      8-  8-      00          MA  14  EY  ve
 
     71      20  20                  RT  3        
 
     00      08  08                         MI    
 
     1                               PH      CH    
 
                                     AR      AE    
 
                                     MA      L     
 
                                     CY      S     
 
                                                
 
                            #5            
 
                            91         
 
                                   
 
                                 
 
                          
 
                        
 
                        
 
                       
 
               
 
               
 
               
 
 CE  00      07  07  00  28  7       CA  64  LIBBY  Ac
 
 PH  09      -0  -1      .0          RL  52  SE  ti
 
 AL  33      3-  7-      00          IS  42     ve
 
 EX  14      20  20                  LE      T.    
 
 IN  70      08  08                              
 
    5                               DR      LO    
 
 50                                  UG      RE    
 
 0                                          NZ    
 
 MG                                  IN      O     
 
                                  C       MD    
 
 CA                                      
 
 PS                                 LIBBY 
 
 UL                           SE 
 
 E                               
 
                          
 
                        
 
                        
 
                        
 
                    
 
                  
 
                  
 
                  
 
              
 
     00      05  06  00  20  5       CL  17  No  Ac
 
     05      -1  -0      .0          IN  09  t   ti
 
     44      9-  5-      00          IC  62  Av  ve
 
     65      20  20                         ai    
 
     02      08  08                  PH      la    
 
     9                               AR      bl    
 
                                     MA      e     
 
                                     CY            
 
                                                   
 
                                                 
 
                                          
 
                                       
 
                                 
 
                                 
 
                          
 
                       
 
                     
 
                  
 
     00      05  05  00  30  7       CL  17  No  Ac
 
     05      -0  -2      .0          IN  02  t   ti
 
     44      8-  2-      00          IC  90  Av  ve
 
     65      20  20                         ai    
 
     02      08  08                  PH      la    
 
     9                               AR      bl    
 
                                     MA      e     
 
                                     CY            
 
                                                   
 
                                                 
 
                                          
 
                                       
 
                                 
 
                                 
 
                          
 
                       
 
                     
 
                  
 
 OX  00      05  05  00  30  4       RI  73  No  Ac
 
 YC  40      -0  -2      .0          TE  20  t   ti
 
 OD  60      5-  2-      00             17  Av  ve
 
 ON  51      20  20                  AI      ai    
 
 E-  20      08  08                  D       la    
 
 AC  1                               PH      bl    
 
 ET                                  AR      e     
 
 AM                                  M             
 
 IN                                  #3            
 
 OP                                93            
 
 HE                         8             
 
 N                                     
 
 5-                              
 
 32                              
 
 5                        
 
                       
 
                     
 
                     
 
                  
 
                 
 
               
 
               
 
               
 
              
 
     00      04  05  00  30  8       CL  16  No  Ac
 
     40      -2  -0      .0          IN  94  t   ti
 
     60      5-  8-      00          IC  74  Av  ve
 
     35      20  20                         ai    
 
     70      08  08                  PH      la    
 
     5                               AR      bl    
 
                                     MA      e     
 
                                     CY            
 
                                                   
 
                                                 
 
                                          
 
                                       
 
                                 
 
                                 
 
                          
 
                       
 
                     
 
                  
 
     00      04  04  00  20  5       CL  16  No  Ac
 
     40      -1  -2      .0          IN  90  t   ti
 
     60      8-  4-      00          IC  64  Av  ve
 
     35      20  20                         ai    
 
     70      08  08                  PH      la    
 
     5                               AR      bl    
 
                                     MA      e     
 
                                     CY            
 
                                                   
 
                                                 
 
                                          
 
                                       
 
                                 
 
                                 
 
                          
 
                       
 
                     
 
                  
 
                                                                                
                                                                                
                                                                                
                                                                                
                                                                                
                                                                                
                                                                                
                                                                                
                                                  
 
Procedures
                      
 
 
 Procedure  DOS        Code       Location   Performer  Comment  
 
                                                                 
 
                                                 
 
 NEEDLE     12525      [facility]            
 
 EMG EA   7                     [facility]           
 
 EXTREMTY                     INC        INC       
 
 W/PARASPI                           
 
 NL AREA              
 
 COMPLETE      
 
               
 
              
 
 NERVE     30281      [facility]            
 
 CONDUCTIO  7                     [facility]           
 
 N STUDIES                    INC        INC       
 
  3-4                            
 
 STUDIES               
 
               
 
             
 
 BLOOD     72294      [facility]            
 
 COUNT   7                     [facility]           
 
 COMPLETE                     INC        INC       
 
 AUTO&AUTO                           
 
  DIFRNTL              
 
 WBC           
 
               
 
         
 
 HEPATITIS    38775      [facility]            
 
  A   7                     [facility]           
 
 ANTIBODY                     INC        INC       
 
 HAAB                                
 
                       
 
          
 
 COMPREHEN    33050      [facility]            
 
 SIVE   7                     [facility]           
 
 METABOLIC                    INC        INC       
 
  PANEL                              
 
                       
 
            
 
 [place]     [address]      [facility]            
 
 THYROID   7                     [facility]           
 
 STIMULATI                    INC        INC       
 
 NG                            
 
 HORMONE              
 
 TSH           
 
               
 
         
 
 ANTINUCLE    24141      [facility]            
 
 AR   7                     [facility]           
 
 ANTIBODIE                    INC        INC       
 
 S GARETT                               
 
                       
 
           
 
 [place]     [address]      [facility]            
 
 THYROXINE  7                     [facility]           
 
  TOTAL                       INC        INC       
 
                                     
 
                    
 
 [place]     [address]      [facility]            
 
 BLOOD/URI  7                     [facility]           
 
 C ACID                       INC        INC       
 
                                     
 
                    
 
 RHEUMATOI    32458      [facility]            
 
 D FACTOR   7                     [facility]           
 
 QUANTITAT                    INC        INC       
 
 CLARISSE                                 
 
                       
 
         
 
 HEPATITIS    75549      [facility]            
 
  C   7                     [facility]           
 
 ANTIBODY                     INC        INC       
 
                                     
 
                      
 
 HEPATITIS    12993      [facility]            
 
  B CORE   7                     [facility]           
 
 ANTIBODY                     INC        INC       
 
 HBCAB                            
 
 TOTAL                 
 
               
 
           
 
 [place]     [address]      [facility]            
 
 HEPATITIS  7                     [facility]           
 
  B                     INC        INC       
 
 SURFACE                            
 
 ANTIGEN               
 
               
 
             
 
 SEDIMENTA    08417      [facility]            
 
 TION RATE  7                     [facility]           
 
  RBC                     INC        INC       
 
 NON-AUTOM                           
 
 ATED                  
 
               
 
          
 
 HEMOGLOBI    18391      [facility]            
 
 N   7                     [facility]           
 
 GLYCOSYLA                    INC        INC       
 
 LESLYE A1C                             
 
                       
 
             
 
 SUSCEPTIB    52356      LAB AMEE   LAB AMEE            
 
 LTY STDY   7                     [facility]           
 
 ANTIMICRB                    HOLDINGS   HOLDINGS  
 
 IAL                            
 
 MICRO/AGA                       
 
 R DILUTJ      
 
               
 
              
 
 CUL BACT     44615      LAB AMEE   LAB AMEE            
 
 XCPT   7                     [facility]           
 
 URINE                     HOLDINGS   HOLDINGS  
 
 BLOOD/STO                           
 
 OL                        
 
 AEROBIC      
 
 ISOL          
 
               
 
          
 
 CUL BACT     97577      LAB AMEE   LAB AMEE            
 
 AEROBIC   7                     [facility]           
 
 ADDL                     HOLDINGS   HOLDINGS  
 
 METHS                            
 
 DEFINITIV                       
 
 E EA ISOL     
 
               
 
               
 
 RADEX     06883      [facility] [place]   RADMANESH           
 
 ELBOW   7                     RADIOLOGY                     
 
 COMPLETE                                          
 
 MINIMUM 3                 
 
  VIEWS        
 
               
 
            
 
 IADNA     99447      [facility] ALVA            
 
 NEISSER[place]  6                     LUCÍA ZAMORA  GLORY                
 
                                           
 
 GONORRHOE                     
 
 AE DIRECT     
 
  PROBE TQ     
 
               
 
               
 
 IADNA     38917      BIO   BIO            
 
 [place]  6                     REFERNCE   REFERNCE           
 
                      LABORATOR  LABORATOR 
 
 GONORRHOE      IES        IES       
 
 AE                          
 
 AMPLIFIED             
 
  PROBE TQ     
 
               
 
               
 
 IADNA NOS    89939      BIO   BIO            
 
    6                     REFERNCE   REFERNCE           
 
 AMPLIFIED                    LABORATOR  LABORATOR 
 
  PROBE TQ      IES        IES       
 
  EACH                          
 
 ORGANISM              
 
               
 
              
 
 IADNA     63322      BIO   BIO            
 
 CHLAMYD[place]  6                     REFERNCE   REFERNCE           
 
                      LABORATOR  LABORATOR 
 
 TRACHOMAT      IES        IES       
 
 IS                          
 
 AMPLIFIED             
 
  PROBE TQ     
 
               
 
               
 
 IADNA     12001      BIO   BIO            
 
 TRICHOMON  6                     REFERNCE   REFERNCE           
 
 AS                     LABORATOR  LABORATOR 
 
 VAGINALIS      IES        IES       
 
                           
 
 AMPLIFIED             
 
  PROBE      
 
 TECH          
 
               
 
          
 
 CYTP C/V     02264      BIO   BIO            
 
 AUTO THIN  6                     REFERNCE   REFERNCE           
 
  LYR                     LABORATOR  LABORATOR 
 
 PREPJ SCR      IES        IES       
 
  MNL                          
 
 RESCR              
 
 PHYS          
 
               
 
          
 
 CULTURE     50028      [facility] CASTREJON            
 
 CHLAMYDIA  6                     LUCÍA ZAMORA  LUCIANO                
 
  ANY                                          
 
 SOURCE                        
 
               
 
            
 
 PHYSICAL   04-  34351      [facility]            
 
 THERAPY   5                     [facility]           
 
 EVALUATIO                    INC        INC       
 
 N                                   
 
                       
 
       
 
 LEVEL IV     90546      [facility]  [facility]           
 
 SURG   5                      LLC                          
 
 PATHOLOGY                                         
 
              
 
 GROSS&LIDA     
 
 ROSCOPIC      
 
 EXAM          
 
               
 
          
 
 ANESTHESI    03770      [facility]  [facility] G[facility]ZAL[facility]           
 
 A   5                      ANESTH                      
 
 INTRAPERI                    OF THE             
 
 TONEAL       BLUE       
 
 LOWER ABD               
 
  W/LAPS           
 
 NOS           
 
               
 
         
 
 SALPINGO-    35273      [facility] CASTREJON            
 
 OOPHORECT  5                     LUCÍA WOLF                
 
 KARLA                                          
 
 COMPL/PRT                     
 
 L UNI/BI      
 
 SPX           
 
               
 
         
 
 OTHER     6562       [facility]            
 
 REMOVAL   5                     [facility]           
 
 OF                       INC        INC       
 
 REMAINING                           
 
  OVARY              
 
 AND TUBE      
 
               
 
              
 
 OTHER     5459       [facility]            
 
 LYSIS OF   5                     [facility]           
 
 PERITONEA                      INC        INC       
 
 L                            
 
 ADHESIONS             
 
               
 
               
 
 SMR PRIM     89615      [facility] ADAMS            
 
 SRC WET   5                     LUCÍA CASTREJON MD          
 
 [facility]                                          
 
 NFCT AGT                            
 
               
 
              
 
 URNLS DIP    53975      [facility]            
 
    5                     [facility]           
 
 STICK/TAB                    INC        INC       
 
 LET                            
 
 REAGENT              
 
 AUTO      
 
 MICROSCOP     
 
 Y             
 
               
 
       
 
 US     57530      [facility]            
 
 TRANSVAGI  5                     [facility]           
 
 NAL                          INC        INC       
 
                                     
 
                 
 
 BLOOD     09606      [facility]            
 
 COUNT   5                     [facility]           
 
 COMPLETE                     INC        INC       
 
 AUTO&AUTO                           
 
  DIFRNTL              
 
 WBC           
 
               
 
         
 
 [place]     50141      [facility]            
 
 TRANSVAGI  5                     [facility]           
 
 NAL                          INC        INC       
 
                                     
 
                 
 
 THERAPEUT    37259      [facility] CASTREJON            
 
 IC   5                     [facility]A WOLF                
 
 PROPHYLAC                                         
 
 TIC/DX                      
 
 INJECTION     
 
  SUBQ/IM      
 
               
 
              
 
 INJECTION          [facility] CASTREJ[facility]            
 
    5                     [facility] WOL[facility]                
 
 PROGESTER                                         
 
 ONE PER                      
 
 50 MG         
 
               
 
           
 
 BLOOD     40596      [facility]            
 
 COUNT   5                     [facility]           
 
 COMPLETE                     INC        INC       
 
 AUTO&AUTO                           
 
  DIFRNTL              
 
 WBC           
 
               
 
         
 
 COLLECTIO    65027      [facility]            
 
 N VENOUS   5                     [facility]           
 
 BLOOD                     INC        INC       
 
 VENIPUNCT                           
 
 URE                   
 
               
 
         
 
 US PELVIC    59719      [facility]            
 
    5                     [facility]           
 
 NONOBSTET                    INC        INC       
 
 [place]                            
 
 REAL-TIME             
 
  IMAGE      
 
 COMPLETE      
 
               
 
              
 
 HOSPITAL           [facility]            
 
 OBSERVATI  5                     [facility]           
 
 ON                     INC        INC       
 
 SERVICE                            
 
 PER HOUR              
 
               
 
              
 
 BLOOD   02-  73282      [facility]            
 
 COUNT   5                     [facility]           
 
 HEMATOCRI                    INC        INC       
 
 T                                   
 
                       
 
       
 
 HOSPITAL   02-        [facility]            
 
 OBSERVATI  5                     [facility]           
 
 ON                     INC        INC       
 
 SERVICE                            
 
 PER HOUR              
 
               
 
              
 
 THERAPEUT  02-  94610      [facility]            
 
 IC   5                     [facility]           
 
 PROPHYLAC                    INC        INC       
 
 TIC/DX                            
 
 INJECTION             
 
  SUBQ/IM      
 
               
 
              
 
 INJECTION  02-        [facility]            
 
    5                     [facility]           
 
 ONDANSETR                    INC        INC       
 
 ON HCL                            
 
 PER 1 MG              
 
               
 
              
 
 IV   02-  91767      [facility]            
 
 INFUSION   5                     [facility]           
 
 THERAPY/P                    INC        INC       
 
 ROPHYLAXI                           
 
 S /DX 1ST             
 
  TO 1 HR      
 
               
 
              
 
 THERAPEUT  02-  14396      [facility]            
 
 IC   5                     [facility]           
 
 INJECTION                    INC        INC       
 
  IV PUSH                            
 
 EACH NEW              
 
 DRUG          
 
               
 
          
 
 INJECTION  02-        [facility]            
 
    5                     [facility]           
 
 ACETAMINO                    INC        INC       
 
 PHEN 10                            
 
 MG                    
 
               
 
        
 
 LEVEL V   02-  16020      [facility]  [facility]            
 
 SURG   5                      LLC       [place]                
 
 PATHOLOGY                                         
 
                  
 
 GROSS&LIDA     
 
 ROSCOPIC      
 
 EXAM          
 
               
 
          
 
 COLLECTIO  02-  63036      [facility]            
 
 N VENOUS   5                     [facility]           
 
 BLOOD                     INC        INC       
 
 VENIPUNCT                           
 
 URE                   
 
               
 
         
 
 VAGINAL   02-  25363      [facility] VAZQUEZON            
 
 HYSTERECT  5                     [facility]           
 
 KARLA                     INC        INC       
 
 UTERUS                            
 
 250 GM/<              
 
               
 
              
 
 UNLISTED   02-  24383      [facility]            
 
 PX FEMALE  5                     [facility]           
 
  GENITAL                     INC        INC       
 
 SYSTEM                            
 
 NONOBSTET             
 
 RICAL         
 
               
 
           
 
 BLOOD   02-  89039      [facility]   [facility]            
 
 COUNT   5                     [facility]           
 
 HEMOGLOBI                    INC        INC       
 
 N                                   
 
                       
 
       
 
 ANESTHESI  02-  12153      [facility] VAGINAL  5                      ANESTH   SHE                
 
                      OF THE             
 
 HYSTERECT      BLUE           
 
 KARLA INCL                
 
 BIOPSY             
 
               
 
            
 
 URNLS DIP  02-  47527      [facility]            
 
    5                     [facility]           
 
 STICK/TAB                    INC        INC       
 
 LET                            
 
 REAGENT              
 
 AUTO      
 
 MICROSCOP     
 
 Y             
 
               
 
       
 
 URNLS DIP    27435      [facility]            
 
    5                     [facility]           
 
 STICK/TAB                    INC        INC       
 
 LET                            
 
 REAGENT              
 
 AUTO      
 
 MICROSCOP     
 
 Y             
 
               
 
       
 
 URINE     29548      [facility]            
 
 PREGNANCY  5                     [facility]           
 
  TEST                     INC        INC       
 
 VISUAL                            
 
 COLOR              
 
 CMPRSN      
 
 METHS         
 
               
 
           
 
 COLLECTIO    51250      [facility]            
 
 N VENOUS   5                     [facility]           
 
 BLOOD                     INC        INC       
 
 VENIPUNCT                           
 
 URE                   
 
               
 
         
 
 BLOOD     94552      [facility]            
 
 COUNT   5                     [facility]           
 
 COMPLETE                     INC        INC       
 
 AUTO&AUTO                           
 
  DIFRNTL              
 
 WBC           
 
               
 
         
 
 SMR PRIM     45422      KARLOS CASTREJON            
 
 SRC WET   5                     LUCÍA RODRIGES                                          
 
 NFCT AGT                      
 
               
 
              
 
 RADIOLOGI    97979      [facility] [place]   SCALF TANIA           
 
 C EXAM   5                     RADIOLOGY                     
 
 CHEST 2                                          
 
 VIEWS                  
 
 FRONTAL&L     
 
 ATERAL        
 
               
 
            
 
 COLLECTIO    58330      [facility] N[facility]            
 
 N VENOUS   5                     [facility]           
 
 BLOOD                     INC        INC       
 
 VENIPUNCT                           
 
 URE                   
 
               
 
         
 
 IMMUNOASS    63717      [facility]UN[facility]            
 
 AY TUMOR   5                     [facility]           
 
 ANTIGEN                     INC        INC       
 
 QUANTITAT                           
 
 CLARISSE                   
 
               
 
         
 
 US   01-  92511      [facility] NUNEZ            
 
 TRANSVAGI  5                     [facility]           
 
 NAL                          INC        INC       
 
                                     
 
                 
 
 HANDLG&/O    15669      KARLOS ROSASPEL            
 
 R CONVEY   5                     LUCÍA ZAMORA  LUCIANO                
 
 OF SPEC                                          
 
 FOR TR                      
 
 OFFICE TO     
 
  LAB          
 
               
 
          
 
 URINLS     44843      KARLOS CASTREJON            
 
 DIP   5                     LUCÍA WOLF                
 
 STICK/TAB                                         
 
 LET                      
 
 REAGNT      
 
 NON-AUTO      
 
 MICRSCPY      
 
               
 
              
 
 CULTURE     23902      KARLOS CASTREJON            
 
 CHLAMYDIA  5                     LUCÍA WOLF                
 
  ANY                                          
 
 SOURCE                        
 
               
 
            
 
 IADNA     69430      KARLOS CASTREJON            
 
 NEISSERIA  5                     LUCÍA WOLF                
 
                                           
 
 GONORRHOE                     
 
 AE DIRECT     
 
  PROBE TQ     
 
               
 
               
 
 LOCM           [facility]            
 
 [phone]   4                     [facility]           
 
 MG/ML                     INC        INC       
 
 IODINE                            
 
 CONCENTRA             
 
 TION PER      
 
 ML            
 
               
 
        
 
 CT THORAX    81389      [facility] N[facility]            
 
    4                     [facility]           
 
 W/CONTRAS                    INC        INC       
 
 T                            
 
 MATERIAL              
 
               
 
              
 
 RADIOLOGI    67199      [facility] N[facility]            
 
 C EXAM   4                     [facility]           
 
 CHEST 2                     INC        INC       
 
 VIEWS                            
 
 FRONTAL&L             
 
 ATERAL        
 
               
 
            
 
 ECG     50396      [facility]            
 
 ROUTINE   4                     [facility]   [place]                
 
 ECG                     EMERGENCY            
 
 W/LEAST        PHYSI         
 
 12 [facility]                
 
 I&R [facility]           ENRIQUE NUNEZ            
 
 OUTPATIEN  4                     [facility]           
 
 T CLIN                     INC        INC       
 
 VISIT                            
 
 ASSESS &              
 
 MGMT PT       
 
               
 
             
 
 IV   10-  72535      [facility] NUNEZ            
 
 INFUSION   4                     [facility]           
 
 THERAPY/P                    INC        INC       
 
 ROPHYLAXI                           
 
 S /DX 1ST             
 
  TO 1 HR      
 
               
 
              
 
 IV   10-  16943      EN[facility] NUNEZ            
 
 INFUSION   4                     [facility]           
 
 THERAPY/P                    INC        INC       
 
 ROPHYLAXI                           
 
 S /DX 1ST             
 
  TO 1 HR      
 
               
 
              
 
 DRUG   10-  11869      [facility] NUNEZ            
 
 SCREEN   4                     [facility]           
 
 QUANTITAT                    INC        INC       
 
 CLARISSE                            
 
 VANCOMYCI             
 
 N             
 
               
 
       
 
 CREATININ  10-  65509      EN[facility] NUNEZ            
 
 E BLOOD    4                     [facility]           
 
                              INC        INC       
 
                                   
 
             
 
 IV   10-  76729      [facility] NUNEZ            
 
 INFUSION   4                     [facility]           
 
 THERAPY/P                    INC        INC       
 
 ROPHYLAXI                           
 
 S /DX 1ST             
 
  TO 1 HR      
 
               
 
              
 
 INSJ PRPH  10-  53216      [facility] NUNEZ            
 
  CVC W/O   4                     [facility]           
 
 SUBQ                     INC        INC       
 
 PORT/                            
 
 AGE 3              
 
 YR/>          
 
               
 
          
 
 INCISION   10-  05728      [facility]   CLEMENTE            
 
 &   4                     PHYSICIAN  [place]                
 
 DRAINAGE                     S GROUP              
 
 ABSCESS                      
 
 SIMPLE/SI             
 
 NGLE          
 
               
 
          
 
 IV   10-  67581      ENRIQUE NUNEZ            
 
 INFUSION   4                     [facility]           
 
 THERAPY/P                    INC        INC       
 
 ROPHYLAXI                           
 
 S /DX 1ST             
 
  TO 1 HR      
 
               
 
              
 
 RADIOLOGI  10-  60099      ENRIQUE NASH            
 
 C EXAM   4                     [facility]   JR GABRIELLA             
 
 CHEST 2                     INC                  
 
 VIEWS                         
 
 FRONTAL&L         
 
 ATERAL        
 
               
 
            
 
 IV   10-  29083      ENRIQUE NUNEZ            
 
 INFUSION   4                     [facility]           
 
 THERAPY                     INC        INC       
 
 PROPHYLAX                           
 
 IS/DX EA              
 
 HOUR          
 
               
 
          
 
 CATHETER   10-        ENRI[facility] NUNEZ            
 
 INFUS   4                     [facility]           
 
 INSRT                     INC        INC       
 
 PERIPHERA                           
 
 LLY              
 
 CNTRLLY/M     
 
 IDLN          
 
               
 
          
 
 IV   10-  66677      ENRI[facility] NUNEZ            
 
 INFUSION   4                     [facility]           
 
 THERAPY/P                    INC        INC       
 
 ROPHYLAXI                           
 
 S /DX 1ST             
 
  TO 1 HR      
 
               
 
              
 
 IV   10-  46000      ENRI[facility] NUNEZ            
 
 INFUSION   4                     [facility]           
 
 THERAPY/P                    INC        INC       
 
 ROPHYLAXI                           
 
 S /DX 1ST             
 
  TO 1 HR      
 
               
 
              
 
 IV   10-  35896      EN[facility]QUE NUNEZ            
 
 INFUSION   4                     [facility]           
 
 THERAPY                     INC        INC       
 
 PROPHYLAX                           
 
 IS/DX EA              
 
 HOUR          
 
               
 
          
 
 DRUG   10-  62218      ENRI[facility] NUNEZ            
 
 SCREEN   4                     [facility]           
 
 QUANTITAT                    INC        INC       
 
 CLARISSE                            
 
 VANCOMYCI             
 
 N             
 
               
 
       
 
 IV   10-  49580      ENRIQUE NUNEZ            
 
 INFUSION   4                     [facility]           
 
 THERAPY                     INC        INC       
 
 PROPHYLAX                           
 
 IS/DX EA              
 
 HOUR          
 
               
 
          
 
 IV   10-  32887      ENRIQUE NUNEZ            
 
 INFUSION   4                     [facility]           
 
 THERAPY/P                    INC        INC       
 
 ROPHYLAXI                           
 
 S /DX 1ST             
 
  TO 1 HR      
 
               
 
              
 
 IV   10-  23580      ENRIQUE NUNEZ            
 
 INFUSION   4                     [facility]           
 
 THERAPY/P                    INC        INC       
 
 ROPHYLAXI                           
 
 S /DX 1ST             
 
  TO 1 HR      
 
               
 
              
 
 IV   10-  29789      ENRIQUE VAZQUEZON            
 
 INFUSION   4                     [facility]           
 
 THERAPY                     INC        INC       
 
 PROPHYLAX                           
 
 IS/DX EA              
 
 HOUR          
 
               
 
          
 
 [place]   10-  33226      ENRI[facility] NUNEZ            
 
 UREA   4                     [facility]           
 
 NITROGEN                     INC        INC       
 
 QUANTITAT                           
 
 CLARISSE                   
 
               
 
         
 
 CREATININ  10-  14802      ENRIQUE NUNEZ            
 
 E BLOOD    4                     [facility]           
 
                              INC        INC       
 
                                   
 
             
 
 SUSCEPTIB  10-  78352      EN[facility] NUNEZ            
 
 LTY STDY   4                     [facility]           
 
 ANTIMICRB                    INC        INC       
 
 IAL                            
 
 MICRO/AGA             
 
 R DILUTJ      
 
               
 
              
 
 CUL BACT   10-  17870      [facility] NUNEZ            
 
 AEROBIC   4                     [facility]           
 
 ADDL                     INC        INC       
 
 METHS                            
 
 DEFINITIV             
 
 E EA [place]OL     
 
               
 
               
 
 [place]     [address]      [facility] NUNEZ            
 
 THYROXINE  4                     [facility]           
 
  TOTAL                       INC        INC       
 
                                     
 
                    
 
 GENERAL     25318      [facility] NUNEZ            
 
 HEALTH   4                     [facility]           
 
 PANEL                        INC        INC       
 
                                     
 
                   
 
 HEPATITIS    44712      [facility] NUNEZ            
 
  C   4                     [facility]           
 
 ANTIBODY                     INC        INC       
 
                                     
 
                      
 
 HEPATITIS    98911      [facility] NUNEZ            
 
  B CORE   4                     [facility]           
 
 ANTIBODY                     INC        INC       
 
 HBCAB                            
 
 TOTAL                 
 
               
 
           
 
 HEPATITIS    28029      ENRIQUE NATHAN SURF   4                     [facility]           
 
 ANTIBODY                     INC        INC       
 
 HBSAB                               
 
                       
 
           
 
 CYANOCOBA    38508      [facility] NUNEZ            
 
 CHELSEA   4                     [facility]           
 
 VITAMIN                     INC        INC       
 
 B-12                                
 
                       
 
          
 
 [place]     [address]      EN[facility] NUNEZ            
 
 HEPATITIS  4                     [facility]           
 
  B                     INC        INC       
 
 SURFACE                            
 
 ANTIGEN               
 
               
 
             
 
 LIPID     59920      [facility] NUNEZ            
 
 PANEL      4                     [facility]           
 
                              INC        INC       
 
                                 
 
             
 
 HEMOGLOBI    83976      [facility] NUNEZ            
 
 N   4                     [facility]           
 
 GLYCOSYLA                    INC        INC       
 
 LESLYE A1C                             
 
                       
 
             
 
 [place]     [address]      [facility] NUNEZ            
 
 VITAMIN A  4                     [facility]           
 
                              INC        INC       
 
                                     
 
             
 
 HEPATITIS    34494      EN[facility]QUE NUNEZ            
 
  A   4                     [facility]           
 
 ANTIBODY                     INC        INC       
 
 HAAB                                
 
                       
 
          
 
 RADEX     74396      [facility] NUNEZ            
 
 SPINE   4                     [facility]           
 
 LUMBOSACR                    INC        INC       
 
 AL                            
 
 MINIMUM 4             
 
  VIEWS        
 
               
 
            
 
 OPHTH     98927      PATI KRUEGER,           
 
 MEDICAL   0                      SHAHLA GALE           
 
 XM&EVAL                     JORGE ROGERS                            
 
 NEW PT          
 
 1/> VST       
 
               
 
             
 
 DETERMINA    56329      PATI KRUEGER,           
 
 TION   0                      SHAHLA GALE           
 
 REFRACTIV                    W          W         
 
 E STATE                             
 
                   
 
             
 
 COLLECTIO    87094      CHUY VERA            
 
 N VENOUS   0                     [facility] 
 
 VENIPUNCT                           
 
 URE                               
 
               
 
         
 
 LIPID     [id number]      CHUY VERA            
 
 PANEL      0                     [facility] 
 
                                 
 
                         
 
 SERVICES     [id number]      [facility],            
 
 [facility]   9                     [place]          
 
 OFFICE                                          
 
 OT/N                            
 
 REG SCHED     
 
  HOURS        
 
               
 
            
 
 COLLECTIO    70126      CHUY VERA            
 
 N VENOUS   9                     [facility] 
 
 VENIPUNCT                           
 
 URE                               
 
               
 
         
 
 LIPID     [id number]      [facility]      [facility]                     [facility] 
 
                                 
 
                         
 
 GENERAL     [id number]      [facility]   9                     [facility]   HOSPITAL 
 
                                     
 
                               
 
 IV NFS     58949      ENRIQUE NUNEZ            
 
 THER   8                     [facility]           
 
 PROPH/DX                     INC        INC       
 
 1ST >1 HR                           
 
                       
 
               
 
 CULTURE     26900      EN[facility] NUNEZ            
 
 BACTERIAL  8                     [facility]           
 
                      INC        INC       
 
 QUANTTATI                           
 
 VE COLONY             
 
  COUNT      
 
 URINE         
 
               
 
           
 
 [place]     [address]      ENRIQUE NUNEZ            
 
 LIPASE     8                     [facility]           
 
                              INC        INC       
 
                                  
 
             
 
 URNLS DIP    90092      ENRIQUE   ENRIQUE            
 
    8                     [facility]           
 
 STICK/TAB                    INC        INC       
 
 LET                            
 
 REAGENT              
 
 AUTO      
 
 MICROSCOP     
 
 Y             
 
               
 
       
 
 URINE     66403      [facility] NUNEZ            
 
 PREGNANCY  8                     [facility]           
 
  TEST                     INC        INC       
 
 VISUAL                            
 
 COLOR              
 
 CMPRSN      
 
 METHS         
 
               
 
           
 
 COMPREHEN    62083      [facility] NUNEZ            
 
 SIVE   8                     [facility]           
 
 METABOLIC                    INC        INC       
 
  PANEL                              
 
                       
 
            
 
 BLOOD     68468      ENRIQUE NUNEZ            
 
 COUNT   8                     [facility]           
 
 COMPLETE                     INC        INC       
 
 AUTO&AUTO                           
 
  DIFRNTL              
 
 WBC           
 
               
 
         
 
 [place]     [address]      ENRIQUE NUNEZ            
 
 AMYLASE    8                     [facility]           
 
                              INC        INC       
 
                                   
 
             
 
 RADEX     11893      [facility]   8                        [facility] S          
 
 MINIMUM 3                    RADIOLOGY            
 
  VIEWS                      
 
              ASSOCIATE  
 
           S PSC      
 
               
 
           
 
 COMPREHEN    77767      EN[facility]QUE NUNEZ            
 
 SIVE   8                     [facility]           
 
 METABOLIC                    INC        INC       
 
  PANEL                              
 
                       
 
            
 
 [place]     [address]      ENRIQUE NUNEZ            
 
 AMYLASE    8                     [facility]           
 
                              INC        INC       
 
                                   
 
             
 
 URNLS DIP    98257      ENRIQUE   ENRIQUE            
 
    8                     [facility]           
 
 STICK/TAB                    INC        INC       
 
 LET                            
 
 REAGENT              
 
 AUTO      
 
 MICROSCOP     
 
 Y             
 
               
 
       
 
 [place]     [address]      EN[facility]QUE NUNEZ            
 
 LIPASE     8                     [facility]           
 
                              INC        INC       
 
                                  
 
             
 
 BLOOD     26264      ENRIQUEDIANELYS NUNEZ            
 
 COUNT   8                     [facility]           
 
 COMPLETE                     INC        INC       
 
 AUTO&AUTO                           
 
  DIFRNTL              
 
 WBC           
 
               
 
         
 
 URINE     63499      [facility]QUE NUNEZ            
 
 PREGNANCY  8                     [facility]           
 
  TEST                     INC        INC       
 
 VISUAL                            
 
 COLOR              
 
 CMPRSN      
 
 METHS         
 
               
 
           
 
 RADEX ABD    37637      [facility] NUNEZ            
 
  COMPL   8                     [facility]           
 
 AQT ABD                     INC        INC       
 
 W/S/E/D                            
 
 VIEWS 1              
 
 VIEW CH       
 
               
 
             
 
 CHOLECYST    53462      [facility]   [facility]            
 
 ECTOMY     8                     [facility] J[facility]ANN,           
 
                              JEAN-PAUL WINSLOW 
 
                                  
 
                         
 
 INITIAL     40951      [facility]   [facility]            
 
 INPATIENT  8                     [facility] J[facility]ANN,           
 
  CONSULT                     JEAN-PAUL WINSLOW 
 
 NEW/ESTAB                           
 
  PT 80                          
 
 MIN           
 
               
 
         
 
 ANESTHESI    33377      [facility]  RAN CALHOUN   8                      ANESTH   YUSUF A             
 
 INTRAPERI                    OF THE             
 
 TONEAL       [place]         
 
 LOWER ABD               
 
  W/LAPS                
 
 NOS           
 
               
 
         
 
 LEVEL III    71623      PATHOLOGY  PATHOLOGY           
 
  SURG   8                      &    &           
 
 PATHOLOGY                    CYTOLOGY   CYTOLOGY  
 
        LAB        LAB       
 
 GROSS&LIDA                         
 
 ROSCOPIC              
 
 EXAM          
 
               
 
          
 
 LEVEL IV     00022      PATHOLOGY  PATHOLOGY           
 
 SURG   8                      &    &           
 
 PATHOLOGY                    CYTOLOGY   CYTOLOGY  
 
        LAB        LAB       
 
 GROSS&L[facility]                         
 
 ROSCOPIC              
 
 EXAM          
 
               
 
          
 
 HYSTEROSC    6812       [facility]            
 
 OPY        8                     [facility]           
 
                             INC        INC       
 
                               
 
             
 
 OTHER     6829       [facility]            
 
 EXCISION   8                     [facility]           
 
 OR                    INC        INC       
 
 DESTRUCTI                           
 
 ON LESION             
 
  UTERUS       
 
               
 
             
 
 OTHER     6909       [facility]Z            
 
 DILATION   8                     [facility]           
 
 AND                    INC        INC       
 
 CURETTAGE                           
 
  OF              
 
 UTERUS        
 
               
 
            
 
 CHOLECYST    5122       [facility] NUNEZ            
 
 ECTOMY     8                     [facility]           
 
                             INC        INC       
 
                                  
 
             
 
 BLOOD     22085      EN[facility] NUNEZ            
 
 COUNT   8                     [facility]           
 
 COMPLETE                     INC        INC       
 
 AUTO&AUTO                           
 
  DIFRNTL              
 
 WBC           
 
               
 
         
 
 URNLS DIP    30730      EN[facility]QUE NUNEZ            
 
    8                     [facility]           
 
 STICK/TAB                    INC        INC       
 
 LET                            
 
 REAGENT              
 
 AUTO      
 
 MICROSCOP     
 
 Y             
 
               
 
       
 
 [place]     21223      [place]   [place]            
 
 TRANSVAGI  8                     MEDICAL   [place] P           
 
 NAL                          IMAGING             
 
                ASSOCIATE           
 
        S          
 
               
 
       
 
 IADNA     57631      [facility]  [place],            
 
 CHLAMYDIA  8                      [place]    XIA[place] E            
 
                                           
 
 TRACHOMAT                       
 
 IS      
 
 AMPLIFIED     
 
  PROBE TQ     
 
               
 
               
 
 CYTP     72825      [facility]  [place],            
 
 CERV/VAG   8                      [place] [facility]    [place] E            
 
 AUTO THIN                                         
 
  LAYER                        
 
 PREP MNL      
 
 SCREEN        
 
               
 
            
 
 IADNA     92544      [facility]  [place],            
 
 NEISSERIA  8                      [place] [facility]    XIA[place] E            
 
                                           
 
 GONORRHOE                       
 
 AE      
 
 AMPLIFIED     
 
  PROBE TQ     
 
               
 
               
 
                                                                                
                                                                                
                                                                                
                                                                                
                                                                                
                                                                                
                                                                                
                                                                                
                                                                                
                                                                                
                                                                                
                                                                                
                                                                                
                                                                                
                                                                                
                                                                                
                                                                                
                                                                                
                                                                                
                                       
 
Encounters
                      
 
 
 Encounter  Start   End Date   Code       Location   Performer
 
  Type      Date                                                 
 
                                                        
 
                
 
 OFFICE     87025      [facility]   [place]   
 
 OUTPATIEN  7          7                     PHYSICIAN           
 
 T VISIT                                S GROUP        
 
 10                    
 
 MINUTES               
 
               
 
             
 
 HOSPITAL                [facility]            
 
 -   7          7                     [facility]            
 
 OUTPATIEN                                   INC                 
 
 T                                             
 
                   
 
       
 
 OFFICE     12150      [facility]   [facility]   
 
 OUTPATIEN  7          7                     PHYSICIAN           
 
 T NEW 30                                S GROUP        
 
 MINUTES                     
 
                       
 
             
 
 HOSPITAL                [facility]            
 
 -   7          7                     [facility]            
 
 OUTPATIEN                                   INC                 
 
 T                                             
 
                   
 
       
 
 OFFICE     08672      [facility]   [place]   
 
 OUTPATIEN  7          7                     PHYSICIAN           
 
 T VISIT                                S GROUP        
 
 25                    
 
 MINUTES               
 
               
 
             
 
 OFFICE     24998      [facility]   
 
 OUTPATIEN  7          7                     HEALTH            
 
 T VISIT                                SOLUTIONS      
 
 15           IN       
 
 MINUTES                 
 
                   
 
             
 
 OFFICE     10088      [facility]   
 
 OUTPATIEN  7          7                     HEALTH            
 
 T VISIT                                SOLUTIONS      
 
 15           IN       
 
 MINUTES                 
 
                   
 
             
 
 OFFICE     51572      [facility]   
 
 OUTPATIEN  7          7                     HEALTH            
 
 T VISIT                                SOLUTIONS      
 
 15           IN       
 
 MINUTES                 
 
                   
 
             
 
 EMERGENCY    36761      [facility]  [facility]
 
    7          7                     [facility]            
 
 DEPARTMEN                               EMERGENCY         
 
 T VISIT           [facility]    
 
 MODERATE                
 
 SEVERITY             
 
               
 
              
 
 [facility]                [place]            
 
 -   7          7                     [facility]           
 
 T                                             
 
                         
 
       
 
 OFFICE     06837      [facility]   
 
 [facility]            
 
 T VISIT                                SOLUTIONS      
 
 15           IN       
 
 MINUTES                 
 
                   
 
             
 
 OFFICE     91302      [facility] 
 
 OUTPATIEN  6          6                     [facility]      
 
 T VISIT                                URGENT            
 
 25          TREAT       
 
 MINUTES                 
 
                     
 
             
 
 OFFICE     24333      [facility] 
 
 [facility]                     [facility]      
 
 T VISIT                                URGENT            
 
 25          TREAT       
 
 MINUTES                 
 
                     
 
             
 
 PERIODIC     93867      [facility] 
 
 PREVENTIV  6          6                     [facility]  [facility]      
 
 E MED EST                                                   
 
  PATIENT                      
 
 16-39 YRS     
 
               
 
               
 
 OFFICE     69291      [facility]   [place] 
 
 OUTPATIEN  5          5                     PHYSICIAN  [place]      
 
 T VISIT                                S GROUP             
 
 15                      
 
 MINUTES               
 
               
 
             
 
 OFFICE   10-  10-  22589      [facility]   [place] 
 
 OUTPATIEN  5          5                     PHYSICIAN  [place]      
 
 T VISIT                                S GROUP             
 
 15                      
 
 MINUTES               
 
               
 
             
 
 OFFICE     93043      [facility]   [place] 
 
 OUTPATIEN  5          5                     PHYSICIAN  [place]      
 
 T VISIT                                S GROUP             
 
 10                      
 
 MINUTES               
 
               
 
             
 
 HOSPITAL   04-  04-             [facility]            
 
 -   5          5                     [facility]            
 
 OUTPATIEN                                   INC                 
 
 T                                             
 
                   
 
       
 
 HOSPITAL   03-  03-             [facility]            
 
 -   5          5                     [facility]            
 
 INPATIENT                                   INC                 
 
                                               
 
                   
 
 HOSPITAL                [facility]            
 
 -   5          5                     [facility]            
 
 OUTPATIEN                                   INC                 
 
 T                                             
 
                   
 
       
 
 OFFICE     39657      [facility] 
 
 OUTPATIEN  5          5                     [facility]  [place]      
 
 T VISIT                                                    
 
 25                      
 
 MINUTES       
 
               
 
             
 
 HOSPITAL                [facility]            
 
 -   5          5                     [facility]            
 
 OUTPATIEN                                   INC                 
 
 T                                             
 
                   
 
       
 
 OFFICE     05077      [facility] 
 
 OUTPATIEN  5          5                     [facility]      
 
 T VISIT                                                    
 
 25                      
 
 MINUTES       
 
               
 
             
 
 HOSPITAL                [facility]            
 
 -   5          5                     [facility]            
 
 OUTPATIEN                                   INC                 
 
                                              
 
                   
 
       
 
 OFFICE     94195      [facility] 
 
 OUTPATIEN  5          5                     [facility]      
 
 T VISIT                                                    
 
 15                      
 
 MINUTES       
 
               
 
             
 
 HOSPITAL   02-  02-             [facility]            
 
 -   5          5                     [facility]            
 
 OUTPATIEN                                   INC                 
 
 T                                             
 
                   
 
       
 
 HOSPITAL                [facility]            
 
 -   5          5                     [facility]            
 
 OUTPATIEN                                   INC                 
 
 T                                             
 
                   
 
       
 
 OFFICE     55011      [facility] 
 
 OUTPATIEN  5          5                     [facility]      
 
 T VISIT                                                    
 
 15                      
 
 MINUTES       
 
               
 
             
 
 OFFICE     00339      [facility] 
 
 OUTPATIEN  5          5                     PHYSICIAN  [place]      
 
 T VISIT                                S GROUP             
 
 10                      
 
 MINUTES               
 
               
 
             
 
 OFFICE     40012      [facility] 
 
 OUTPATIEN  5          5                     [facility]      
 
 T VISIT                                                    
 
 15                      
 
 MINUTES       
 
               
 
             
 
 HOSPITAL                [facility]            
 
 -   5          5                     [facility]            
 
 OUTPATIEN                                   INC                 
 
 T                                             
 
                   
 
       
 
 HOSPITAL   01-  01-             [facility]            
 
 -   5          5                     [facility]            
 
 OUTPATIEN                                   INC                 
 
 T                                             
 
                   
 
       
 
 INITIAL     55887      [facility] 
 
 PREVENTIV  5          5                     [facility]      
 
 E                                                    
 
 MEDICINE                      
 
 NEW PT      
 
 AGE      
 
 [facility]            
 
 -   4          4                     [facility]            
 
 OUTPATIEN                                   [facility]                [facility]            
 
 -   4          4                     [facility]            
 
 OUTPATIEN                                   INC                 
 
 T                                             
 
                   
 
       
 
 OFFICE     65404      [facility] 
 
 OUTPATIEN  4          4                     PHYSICIAN  [place]      
 
 T VISIT                                S GROUP             
 
 10                      
 
 MINUTES               
 
               
 
             
 
 EMERGENCY    79436      [facility] 
 
  DEPT   4          4                     [facility]   [place]      
 
 VISIT                                EMERGENCY           
 
 HIGH           PHYSI      
 
 SEVERITY&               
 
 THREAT             
 
 [facility]                [facility]            
 
 -   4          4                     [facility]            
 
 OUTPATIEN                                   [facility]   10-  10-             [facility]            
 
 -   4          4                     [facility]            
 
 OUTPATIEN                                   [facility]   10-  10-             [facility]            
 
 -   4          4                     [facility]            
 
 OUTPATIEN                                   [facility]   10-  10-             [facility]            
 
 -   4          4                     [facility]            
 
 OUTPATIEN                                   INC                 
 
 T                                             
 
                   
 
       
 
 HOSPITAL   10-  10-             [facility]            
 
 -   4          4                     [facility]            
 
 OUTPATIEN                                   INC                 
 
 T                                             
 
                   
 
       
 
 HOSPITAL   10-  10-             [facility]            
 
 -   4          4                     [facility]            
 
 OUTPATIEN                                   INC                 
 
 T                                             
 
                   
 
       
 
 HOSPITAL   10-  10-             [facility]            
 
 -   4          4                     [facility]            
 
 OUTPATIEN                                   INC                 
 
 T                                             
 
                   
 
       
 
 HOSPITAL   10-  10-             [facility]            
 
 -   4          4                     [facility]            
 
 OUTPATIEN                                   INC                 
 
 T                                             
 
                   
 
       
 
 HOSPITAL   10-  10-             [facility]            
 
 -   4          4                     [facility]            
 
 OUTPATIEN                                   INC                 
 
 T                                             
 
                   
 
       
 
 HOSPITAL   10-  10-             [facility]            
 
 -   4          4                     [facility]            
 
 OUTPATIEN                                   INC                 
 
 T                                             
 
                   
 
       
 
 EMERGENCY  10-  10-  49144      [facility] 
 
  DEPT   4          4                     [facility]   [place]      
 
 VISIT                                EMERGENCY           
 
 HIGH           PHYSI      
 
 SEVERITY&               
 
 THREAT             
 
 FUN         
 
               
 
           
 
 OFFICE   10-  10-  55725      [facility] 
 
 OUTPATIEN  4          4                     PHYSICIAN  [place]      
 
 T VISIT                                S GROUP             
 
 10                      
 
 MINUTES               
 
               
 
             
 
 OFFICE     54082      [facility] 
 
 OUTPATIEN  4          4                     PHYSICIAN  [place]      
 
 T VISIT                                S GROUP             
 
 15                      
 
 MINUTES               
 
               
 
             
 
 [facility]     48186      [facility] 
 
 [facility]  4          4                     [facility]  [place]      
 
 T VISIT                                S GROUP             
 
 10                      
 
 MINUTES               
 
               
 
             
 
 HOSPITAL                [facility]            
 
 -   4          4                     [facility]            
 
 OUTPATIEN                                   INC                 
 
 T                                             
 
                   
 
       
 
 EMERGENCY    34652      [facility] 
 
    4          4                     [facility]   PAT      
 
 DEPARTMEN                               EMERGENCY           
 
 T VISIT           PHYSI      
 
 MODERATE                
 
 SEVERITY             
 
               
 
              
 
 OFFICE     58761      [facility]  0          0                     FRED M  FRED M
 
 T VISIT                                                    
 
 15                            
 
 MINUTES       
 
               
 
             
 
 [facility]     90355      [facility]                     [facility]     ALEX B   
 
 T NEW 30                                                    
 
 MINUTES                       
 
               
 
             
 
 EMERGENCY  04-  04-  74158      [facility]  [place], 
 
    0          0                     [facility]
 
 DEPARTMEN                               EMERGENCY           
 
 T VISIT           PHYS INC         
 
 HIGH/URGE               
 
 NT                
 
 SEVERITY      
 
               
 
              
 
 [facility]     75452      [facility]                     [facility]  FRED M
 
 T VISIT                                                    
 
 15                            
 
 MINUTES       
 
               
 
             
 
 EMERGENCY    75356      [facility]  [place], 
 
    0          0                     [facility] [place] 
 
 DEPARTMEN                               EMERGENCY  A        
 
 T VISIT           PHYS INC          
 
 HIGH/URGE               
 
 NT                
 
 SEVERITY      
 
               
 
              
 
 HOSPITAL                [place]            
 
 -   0          0                     [facility]           
 
 T                                             
 
                         
 
       
 
 HOSPITAL                [place]            
 
 -   9          9                     [facility]           
 
 T                                             
 
                         
 
       
 
 EMERGENCY    98431      [facility], 
 
    8          8                     [facility]                               EMERGENCY           
 
 T VISIT           PHYS INC         
 
 MODERATE                
 
 SEVERITY                
 
               
 
              
 
 OFFICE     75479      [facility]  8          8                     [facility] 
 
 T VISIT                                                    
 
 15                           
 
 MINUTES       
 
               
 
             
 
 HOSPITAL                [facility]            
 
 -   8          8                     [facility]            
 
 OUTPATIEN                                   INC                 
 
 T                                             
 
                   
 
       
 
 EMERGENCY    55730      [facility]            
 
    8          8                     [facility]            
 
 [facility]                 
 
 T VISIT                            
 
 HIGH/URGE         
 
 NT      
 
 SEVERITY      
 
               
 
              
 
 HOSPITAL                [place]            
 
 -   8          8                     [facility]            
 
 T                                             
 
                        
 
       
 
 OFFICE     43583      [facility] 
 
 [facility]  8          8                     [facility] T   
 
 T VISIT                                                    
 
 15                      
 
 MINUTES       
 
               
 
             
 
 HOSPITAL                [facility]            
 
 -   8          8                     [facility]            
 
 OUTPATIEN                                   INC                 
 
 T                                             
 
                   
 
       
 
 EMERGENCY    15026      [facility]            
 
    8          8                     [facility]            
 
 [facility]                 
 
 T VISIT                            
 
 MODERATE          
 
 SEVERITY      
 
               
 
              
 
 HOSPITAL                [facility]            
 
 -   8          8                     [facility]            
 
 INPATIENT                                   INC                 
 
                                               
 
                   
 
 OFFICE     34701      [facility]  8          8                     [facility] 
 
 T VISIT                                                    
 
 15                           
 
 MINUTES       
 
               
 
             
 
 HOSPITAL                [facility]            
 
 -   8          8                     [facility]            
 
 OUTPATIEN                                   INC                 
 
 T                                             
 
                   
 
       
 
 HOSPITAL                [facility]            
 
 -   8          8                     [facility]            
 
 OUTPATIEN                                   INC                 
 
 T

## 2017-10-11 NOTE — EXTERNAL MEDICAL SUMMARY RPT
Patient Summary
 Created on: 10/06/2017
 
ANTONI NAIR
External Reference #: [id number]
: [id number]
Sex: Female
 
Demographics
 
 
 
 Address  [address] [place] [address]
 
 Home Phone  [phone]
 
 Preferred Language  English
 
 Marital Status  Unknown
 
 Buddhist Affiliation  Unknown
 
 Race  White
 
 Ethnic Group  Unknown
 
 
Author
 
 
 
 Author            ,            ALFREDO FUENTES
 
 Address  Unknown
 
 Phone  [email or web address]
 
 
 
Care Team Providers
 
 
 
 Care Team Member Name  Role  Phone
 
 CITLALLI MOSQUERA,   Unavailable  Unavailable
 
 CITLALLI MOSQUERA JR, CHARLES F,  Unavailable  Unavailable
 
  JEAN-PAUL MUKHERJEE JR  
 
    
 
 [facility]   Unavailable  Unavailable
 
 [facility]   
 
 [facility]  
 
    
 
 [facility]   Unavailable  Unavailable
 
 [facility]   
 
 [facility],   Unavailable  Unavailable
 
 [facility]   
 
 [facility]   Unavailable  Unavailable
 
 [facility], [facility]   
 
 [facility]   
 
 [facility],   Unavailable  Unavailable
 
 [facility]   
 
 [facility],   [facility]available  Unavailable
 
 [facility]   
 
 [facility]   [facility]available  Unavailable
 
 [facility]   
 
 [facility]   Unavailable  Unavailable
 
 [facility] [place]   
 
 JULIANNE PEÑALOZA,   Unavailable  Unavailable
 
 JULIANNE PEÑALOZA   
 
 ROBERT GONZALO, ROBERT GONZALO   Unavailable  Unavailable
 
 KANE JR, KANE JR   Unavailable  Unavailable
 
 CLEMENTE, CLEMENTE   Unavailable  Unavailable
 
 CLEMENTE LIDA, CLEMENTE   Unavailable  Unavailable
 
 LIDA   
 
 KARLOS CASTREJON MD,   Unavailable  Unavailable
 
 GLENYS DWYER MD LIDA   Unavailable  Unavailable
 
 HARPEL LUCIANO, HARPEL   Unavailable  Unavailable
 
 KARLOS LAWSON R,   Unavailable  Unavailable
 
 HARPEL, KARLOS R   
 
 [facility]   Unavailable  Unavailable
 
 [facility]   
 
 [facility]   
 
 HARUZAIR WILLS GABRIELLA,   Unavailable  Unavailable
 
 HARSTON FRED MARSHALL,   Unavailable  Unavailable
 
 FRED ROY   
 
 [facility],  Unavailable  Unavailable
 
  [facility]  
 
    
 
 BRADLEY HUERTA   Unavailable  Unavailable
 
 BRADLEY LEGGETT, BRADLEY   Unavailable  Unavailable
 
 NAN   
 
 [facility]   Unavailable  Unavailable
 
 [facility]   
 
 [facility]   
 
 [facility],   Unavailable  Unavailable
 
 [facility]   
 
 REHMAN TANIA, REHMAN   Unavailable  Unavailable
 
 TANIA   
 
 SHAHLA KRUEGER,   Unavailable  Unavailable
 
 SHAHLA KRUEGER EMMETT P,   Unavailable  Unavailable
 
 SMILEY BRENNAN   
 
 [facility],  Unavailable  Unavailable
 
  [facility]   Unavailable  Unavailable
 
 URGENT TREAT,   
 
 [facility]   
 
 [facility]RG[facility]   
 
 [facility]   Unavailable  Unavailable
 
 [facility]   
 
 [facility]   Unavailable  Unavailable
 
 LAB, PATHOLOGY &   
 
 CYTOLOGY LAB   
 
 PAVEZ, PAVEZ   Unavailable  Unavailable
 
 PETTEY, PETTEY   Unavailable  Unavailable
 
 MAHOGANY TOD, MAHOGANY TOD   Unavailable  Unavailable
 
 [id number],  Unavailable  Unavailable
 
  [id number]  
 
    
 
 [id number],  Unavailable  Unavailable
 
  [id number]  
 
    
 
 [facility]   Unavailable  Unavailable
 
 [address] [address] 0391, [facility]   
 
 [facility] [address] #   
 
 0391   
 
 ARTIE GLORY, ARTIE   Unavailable  Unavailable
 
 GLORY   
 
 SCALF TANIA, SCALF TANIA   Unavailable  Unavailable
 
 [facility]   Unavailable  Unavailable
 
 [facility],   
 
 [facility]   
 
 [facility]   
 
 [facility]   Unavailable  Unavailable
 
 [facility],   
 
 [facility]   
 
 EMERG[facility]Y SERV[facility]   
 
 NILDA SHE,   Unavailable  Unavailable
 
 NILDA SHE   
 
 PHOENIX, XIAO E,   Unavailable  Unavailable
 
 [place]EN[place], XIAO E   
 
 [facility]   Unavailable  Unavailable
 
 [facility],   
 
 [facility]   
 
 [facility]   
 
 SWINEY PAT, SWINEY   Unavailable  Unavailable
 
 PAT   
 
 YUSUF CALHOUN,   Unavailable  Unavailable
 
 YUSUF CALHOUN   
 
 [facility]   Unavailable  Unavailable
 
 [address], [facility]   
 
 PHARMACY [address]   
 
 [facility]   Unavailable  Unavailable
 
 [address], [facility]   
 
 [facility]Y [facility]591   
 
 [facility] #   Unavailable  Unavailable
 
 [id number], [facility]   
 
 PHARMACY # [id number]   
 
 FRED FAJARDO,   Unavailable  Unavailable
 
 FRED FAJARDO RYAN B, TANA,   Unavailable  Unavailable
 
 ALEX NATHAN   
 
                                            
 
Purpose
                      Continuity of Care Document - 2008 through 10-06-
2017                                                                            
                     
 
Problems
                      
 
 
 Code         Diagnosis    DOS          Provider     Status     
 
                                                               
 
               
 
         LATERAL   2017   [facility]              
 
              EPICONDYLIT               PHYSICIANS              
 
      IS RIGHT           GROUP                   
 
  ELBOW                       
 
                  
 
      
 
         CARPAL   2017   [facility]              
 
              TUNNEL                PHYSICIANS              
 
      SYNDROME           GROUP                   
 
  RIGHT UPPER                 
 
   LIMB           
 
                
 
      
 
         LESION OF   2017   [facility]              
 
              ULNAR NERVE               PHYSICIANS              
 
       RIGHT           GROUP                   
 
  UPPER LIMB                  
 
                  
 
           
 
 [id number]       PAIN IN   2017   [facility]              
 
              RIGHT ELBOW               PHYSICIANS              
 
                           GROUP                   
 
                          
 
      
 
         MEDIAL   2017   [facility]              
 
              EPICONDYLIT               PHYSICIANS              
 
      IS RIGHT           GROUP                   
 
  ELBOW                       
 
                  
 
      
 
 [id number]         LOW BACK   2017   [facility]              
 
              PAIN                      PHYSICIANS              
 
                           GROUP                   
 
                  
 
      
 
         OTHER   2017   [facility]              
 
              FATIGUE                   PHYSICIANS              
 
                           GROUP                   
 
                      
 
      
 
         SCIATICA   2017   [facility]              
 
              LEFT SIDE                 HEALTH              
 
                           SOLUTIONS              
 
          IN            
 
              
 
 [id number]       CELLULITIS   2017   [facility]              
 
              OF RIGHT                HEALTH              
 
      LOWER LIMB           SOLUTIONS              
 
                IN            
 
                     
 
         CUTANEOUS   2017   [facility]              
 
              ABSCESS OF                HEALTH              
 
      FACE                 SOLUTIONS              
 
                IN            
 
              
 
         UNSPECIFIED  2017   [facility]             
 
               OTITIS                N EMERGENCY             
 
      EXTERNA            SERVI                  
 
  LEFT EAR                    
 
                   
 
         
 
 I10          ESSENTIAL   2017   [facility]             
 
              PRIMARY                N EMERGENCY             
 
      HYPERTENSIO           SERVI                  
 
  N                           
 
                
 
 G608         OTHER   2016   [facility]              
 
      AND           URGENT              
 
  IDIOPATHIC    TREAT         
 
  NEUROPATHIE               
 
  S               
 
             
 
 M542         CERVICALGIA  2016   [facility]              
 
                       URGENT              
 
    TREAT         
 
                
 
      
 
         ACUTE   2016   [facility]              
 
              SINUSITIS                [facility]              
 
      UNSPECIFIED          URGENT              
 
                TREAT         
 
                        
 
      
 
 J028         ACUTE   2016   [facility]              
 
              PHARYNGITIS               [facility]              
 
       DUE TO           URGENT              
 
  OTHER SPEC    TREAT         
 
  ORGANISMS                 
 
                  
 
          
 
 N951         MENOPAUSAL   2016   [facility]              
 
              AND FEMALE                [facility]A [facility]               
 
      CLIMACTERIC                                  
 
   STATES               
 
                
 
        
 
 [id number]       ENCOUNTER   2016   [facility]              
 
              GYN EXAM                [facility]               
 
      GENERAL RTN                                  
 
   W/O            
 
  ABNORMAL    
 
  FIND          
 
                
 
 [id number]       PAIN IN   2015   [facility]              
 
              RIGHT KNEE                PHYSICIANS              
 
                           GROUP                   
 
                         
 
      
 
         INTERVERTEB  2015   [facility]              
 
              RAL DISC                PHYSICIANS              
 
      D/O           GROUP                   
 
  W/RADICULOP                 
 
  ATHY LUMB      
 
  RGN           
 
               
 
 Z720         TOBACCO USE  2015   [facility]              
 
                                        PHYSICIANS              
 
                       GROUP                   
 
                  
 
      
 
 53095        ASTHMA,   2015   [facility]              
 
              UNSPECIFIED               PHYSICIANS              
 
      ,           GROUP                   
 
  UNSPECIFIED                 
 
   STATUS         
 
                
 
        
 
 7242         LUMBAGO      2015   [facility]              
 
                                        PHYSICIANS              
 
                   GROUP                   
 
                  
 
      
 
 V571         OTHER   04-   [facility]              
 
              PHYSICAL                [facility]              
 
      THERAPY              INC                     
 
                             
 
        
 
 220          BENIGN   2015   [facility]              
 
              NEOPLASM OF               PHYSICIANS              
 
       OVARY               GROUP                   
 
                              
 
         
 
 6146         PELVIC   2015   [facility]              
 
              PERITONEAL                LLC                     
 
      ADHESIONS,                                  
 
  FEMALE          
 
                
 
       
 
 6259         UNSPEC   2015   [facility]              
 
              SYMPTOM                PHYSICIANS              
 
      ASSOC           GROUP                   
 
  W/FEMALE                  
 
  GENITAL      
 
  ORGANS        
 
                
 
       
 
 24021        ABDOMINAL   2015   [facility]              
 
              PAIN,                ANESTH OF              
 
      UNSPECIFIED          THE BLUE                
 
   SITE                       
 
                     
 
      
 
 40391        UNSPECIFIED  2015   [facility]              
 
               VAGINITIS                [facility]               
 
      AND                                   
 
  VULVOVAGINI           
 
  TIS           
 
               
 
 6258         OT SPEC   2015   [facility]              
 
              SYMPTOM                [facility]              
 
      ASSOC           INC                     
 
  W/FEMALE                 
 
  GENITAL    
 
  ORGANS        
 
                
 
       
 
         PRE-PROCEDU  2015   [facility]              
 
              RAL                [facility]              
 
      LABORATORY           INC                     
 
  EXAMINATION                
 
                
 
            
 
 0794         HUMAN   02-   [facility]              
 
              PAPILLOMA                LLC                     
 
      VIRUS IN                                  
 
  CCE & UNS      
 
  SITE          
 
                
 
 60023        MILD   02-   [facility]              
 
              DYSPLASIA                LLC                     
 
      OF CERVIX                                   
 
                  
 
          
 
 6268         OTH D/O   02-   [facility]              
 
              MENSTRUATIO               [facility]              
 
      N&OTH ABN           INC                     
 
  BLEED FE                 
 
  GNT TRACT     
 
                
 
          
 
 6269         UNS D/O   02-   [facility]              
 
              MENSTRUATIO               ANEST[facility] OF              
 
      N&OTH ABN           THE BLUE                
 
  BLEED FE                  
 
  GNT TRACT          
 
                
 
          
 
 6262         EXCESSIVE   2015   [facility]              
 
              OR FREQUENT               [facility]              
 
                 INC                     
 
  MENSTRUATIO                
 
  N             
 
             
 
         OTHER   2015   [facility]              
 
              SPECIFIED                [facility]              
 
      PRE-OPERATI          INC                     
 
  VE                 
 
  EXAMINATION   
 
                
 
            
 
 7862         COUGH        2015   [facility]              
 
                                        RADIOLOGY               
 
                                         
 
            
 
 6202         OTHER AND   2015   [facility]              
 
              UNSPECIFIED               [facility]              
 
       OVARIAN           INC                     
 
  CYST                       
 
                
 
         ROUTINE   2015   [facility] CASTREJON MD               
 
      AL                                   
 
  EXAMINATION           
 
                
 
            
 
 73740        CHEST PAIN   2014   [facility]              
 
              UNSPECIFIED               [facility]              
 
                           INC                     
 
                         
 
 6823         CELLULITIS   2014   [facility]              
 
              AND ABSCESS               PHYSICIANS              
 
       OF UPPER           GROUP                   
 
  ARM AND                  
 
  FOREARM         
 
                
 
        
 
 6826         CELLULITIS   2014   [facility]             
 
              AND ABSCESS               N EMERGENCY             
 
       OF LEG            PHYSI                  
 
  EXCEPT FOOT                 
 
                   
 
            
 
 7802         SYNCOPE AND  2014   [facility]             
 
               COLLAPSE                 N EMERGENCY             
 
                            PHYSI                  
 
                        
 
       
 
 6829         CELLULITIS   10-   [facility]              
 
              AND ABSCESS               PHYSICIANS              
 
       OF           GROUP                   
 
  UNSPECIFIED                 
 
   SITE           
 
                
 
      
 
 3829         UNSPECIFIED  2014   [facility]              
 
               OTITIS                PHYSICIANS              
 
      MEDIA                GROUP                   
 
                              
 
        
 
 08080        OTHER   2014   [facility]              
 
              MALAISE AND               [facility]              
 
       FATIGUE             INC                     
 
                             
 
         
 
 04524        CONTACT   2014   [facility]             
 
              DERMATITIS&               N EMERGENCY             
 
      OTHER            PHYSI                  
 
  ECZEMA DUE                  
 
  TO [place]       
 
                
 
           
 
 3540         CARPAL   2010   [facility]              
 
              TUNNEL                [facility]               
 
    SYNDROME                                     
 
                        
 
         
 
 27437        OTHER   2010   [facility]              
 
              TENOSYNOVIT               [facility] L[facility]               
 
    IS OF HAND                                   
 
  AND WRIST             
 
                
 
          
 
 4660         ACUTE   2010   [facility]              
 
              BRONCHITIS                B                       
 
                                              
 
             
 
 7821         RASH AND   2010   [facility]              
 
              OTHER                B                       
 
    NONSPECIFIC                               
 
   SKIN      
 
  ERUPTION      
 
                
 
         
 
 7241         PAIN IN   04-   [facility]             
 
              THORACIC                N EMERGENCY             
 
    SPINE                 PHYS INC               
 
                              
 
            
 
 37320        ACUTE   2010   [facility]              
 
              DERMATITIS                FRED M               
 
    DUE TO                                   
 
  SOLAR            
 
  RADIATION     
 
                
 
          
 
 6926         CONTACT   2010   [facility]             
 
              DERMATITIS&               N EMERGENCY             
 
    OTHER            PHYS INC               
 
  ECZEMA DUE                  
 
  TO PLANTS           
 
                
 
          
 
 3670         HYPERMETROP  2010   [facility] KRUE[facility] W               
 
                                               
 
            
 
 2724         OTHER AND   2010   [place]              
 
              UNSPECIFIED               [facility]              
 
               HOSPITAL                
 
  HYPERLIPIDE                 
 
  AJ                
 
               
 
 2859         UNSPECIFIED  2009   [place]              
 
               ANEMIA                   [facility]                
 
                      
 
         
 
 4739         UNSPECIFIED  2008   [facility]             
 
               SINUSITIS                N EMERGENCY             
 
                          PHYS INC               
 
                         
 
          
 
 5990         URINARY   2008   [facility]              
 
              TRACT                [facility]              
 
    INFECTION           INC                     
 
  SITE NOT                 
 
  SPECIFIED     
 
                
 
          
 
 39263        UNSPECIFIED  2008   [facility]                                        
 
                     
 
      
 
 81321        SWELLING OF  2008   [place]              
 
               LIMB                     RADIOLOGY              
 
                         ASSOCIATES              
 
      PSC           
 
               
 
 5662         HEMORRHAGE   2008   [facility]              
 
              OF RECTUM                [facility]              
 
    AND ANUS             INC                     
 
                             
 
         
 
 57944        CALCU   2008   PATHOLOGY &             
 
              [place]                 CYTOLOGY              
 
    W/OTH           LAB                     
 
  CHOLECYST                 
 
  W/O MENTION   
 
   OBST         
 
                
 
      
 
 25492        CALCU   2008   [facility]              
 
              [place]                JEAN-PAUL F               
 
    W/O MENTION                                  
 
              
 
  CHOLECYST/O   
 
  BST           
 
               
 
 95978        ACUTE AND   2008   [facility] CASTREJON MD               
 
    CHOLECYSTIT                                  
 
  IS                    
 
              
 
 5753         HYDROPS OF   2008   [facility]              
 
              GALLBLADDER               [facility]              
 
                         INC                     
 
                         
 
 6141         CHRONIC   2008   [facility]              
 
              SALPINGITIS               [facility]              
 
     AND           INC                     
 
  OOPHORITIS                 
 
                
 
           
 
 6142         SALPINGITIS  2008   PATHOLOGY &             
 
              &OOPHORITIS                CYTOLOGY              
 
     NOT ACUT           LAB                     
 
  SUBACUT/CHR                
 
  N             
 
             
 
 6200         FOLLICULAR   2008   [facility] &             
 
              CYST OF                 CYTOLOGY              
 
    OVARY                LAB                     
 
                             
 
      
 
 6201         CORPUS   2008   PATHOLOGY &             
 
              LUTEUM CYST                CYTOLOGY              
 
     OR           LAB                     
 
  HEMATOMA                   
 
                
 
         
 
 6210         POLYP OF   2008   [facility]              
 
              CORPUS                [facility]              
 
    UTERI                INC                     
 
                             
 
      
 
 25727        HEMORRHAGE   2008   [facility]              
 
              COMPLICATIN               [facility]               
 
    G A                                   
 
  PROCEDURE            
 
  NEC           
 
               
 
         LAPAROSCOPI  2008   [facility] MUKHERJEE JR SURGICAL                [facility]               
 
    PROC                                   
 
  COVERTED            
 
  OPEN PROC     
 
                
 
          
 
         SPECIAL SCR  2008   [facility]              
 
                              [place] [facility]                  
 
    EXAMINATION                                  
 
   OTH SPEC         
 
  CHLAMYDIAL    
 
  DZ            
 
              
 
 V745         SCREENING   2008   [facility]              
 
              EXAMINATION               Subblime                  
 
     FOR                                   
 
  VENEREAL         
 
  DISEASE       
 
                
 
        
 
 V762         SCREENING   2008   [facility]              
 
              FOR                [place] [facility]                  
 
    MALIGNANT                                   
 
  NEOPLASM OF        
 
   THE CERVIX   
 
                
 
            
 
 V780         SCREENING   2008   [facility]              
 
              FOR IRON                [facility]               
 
    DEFICIENCY                                   
 
  ANEMIA                
 
                
 
       
 
                                                                                
                                                                                
                                                                                
                                                                                
                                                                                
                                                                                
                                                                                
                                                                                
                                                                                
                                                                                
                                                          
 
Allergies, Adverse Reactions, Alerts
                      Clinical Alert Notifications            
 
 
 Alert                
 
 Asthma: absence of controller with h/o SA beta agonist                
 
 Asthma: no influenza vaccine in the last 365 days                
 
                                                                                
                           
 
Medications
                      
 
 
 Na  ND  Rx  Da  Fi  Fi  Am  Da  Di  Ph  RX  Ph  St
 
 me  C   No  te  ll  ll  ou  ys  ag  ar   #  ys  at
 
         rm        s   nt      no  ma      ic  us
 
             Or  Da              si  cy      ia    
 
             de  te              s           n     
 
             re                                    
 
             d                                     
 
                                                   
 
                                                   
 
                                                   
 
                                                  
 
                                   
 
                           
 
                      
 
               
 
              
 
 TI  60      09  09      30  30          00  CA  Ac
 
 ZA  50      -0  -2      .0              00  RL  ti
 
 NI  50      5-  9-      00              00  IS  ve
 
 DI  25      20  20                      77  LE    
 
 NE  20      17  17                      86       
 
    2                                   69  DR    
 
 HC                                          UG    
 
 L                                           S     
 
 4                                                 
 
 MG                                               
 
                                            
 
 TA                                       
 
 BL                                    
 
 ET                                    
 
                                
 
                           
 
                          
 
                        
 
               
 
               
 
               
 
               
 
               
 
 GE  24      08  09      5.  5           00  CA  Ac
 
 NT  20      -2  -2      00              00  RL  ti
 
 AM  80      9-  2-      0               00  IS  ve
 
 IC  58      20  20                      77  LE    
 
 IN  06      17  17                      99       
 
    0                                   88  DR    
 
 0.                                          UG    
 
 3%                                          S     
 
                                                  
 
 EY                                              
 
 E                                           
 
 DR                                      
 
 OP                                    
 
 S                                     
 
                                
 
                           
 
                          
 
                        
 
               
 
               
 
               
 
               
 
              
 
 TI  60      08  08      30  30          00  CA  Ac
 
 ZA  50      -0  -2      .0              00  RL  ti
 
 NI  50      2-  5-      00              00  IS  ve
 
 DI  25      20  20                      77  LE    
 
 NE  20      17  17                      86       
 
    2                                   69  DR    
 
 HC                                          UG    
 
 L                                           S     
 
 4                                                 
 
 MG                                               
 
                                            
 
 TA                                       
 
 BL                                    
 
 ET                                    
 
                                
 
                           
 
                          
 
                        
 
               
 
               
 
               
 
               
 
               
 
 TI  57      06  07      60  30          00  CA  Ac
 
 ZA  66      -2  -2      .0              00  RL  ti
 
 NI  40      9-  8-      00              00  IS  ve
 
 DI  50      20  20                      77  LE    
 
 NE  31      17  17                      48       
 
    8                                   46  DR    
 
 HC                                          UG    
 
 L                                           S     
 
 4                                                 
 
 MG                                               
 
                                            
 
 TA                                       
 
 BL                                    
 
 ET                                    
 
                                
 
                           
 
                          
 
                        
 
               
 
               
 
               
 
               
 
               
 
 TI  57      05  06      60  30          00  CA  Ac
 
 ZA  66      -2  -1      .0              00  RL  ti
 
 NI  40      4-  6-      00              00  IS  ve
 
 DI  50      20  20                      77  LE    
 
 NE  31      17  17                      48       
 
    8                                   46  DR    
 
 HC                                          UG    
 
 L                                           S     
 
 4                                                 
 
 MG                                               
 
                                            
 
 TA                                       
 
 BL                                    
 
 ET                                    
 
                                
 
                           
 
                          
 
                        
 
               
 
               
 
               
 
               
 
               
 
 VE  00      05  06      18  16          00  CA  Ac
 
 NT  17      -2  -1      .0              00  RL  ti
 
 OL  30      4-  6-      00              00  IS  ve
 
 IN  68      20  20                      77  LE    
 
    22      17  17                      48       
 
 HF  0                                   47  DR    
 
 A                                           UG    
 
 90                                          S     
 
                                                  
 
 MC                                               
 
 G                                           
 
 IN                                       
 
 HA                                    
 
 LE                                    
 
 R                              
 
                           
 
                          
 
                        
 
               
 
               
 
               
 
               
 
               
 
              
 
 AC  00      05  05      60  30          00  CA  Ac
 
 ET  09      -0  -2      .0              00  RL  ti
 
 AM  30      2-  6-      00              00  IS  ve
 
 IN  15      20  20                      77  LE    
 
 OP  00      17  17                      42       
 
 HE  1                                   47  DR    
 
 N-                                          UG    
 
 CO                                          S     
 
 D                                                 
 
 #3                                               
 
                                            
 
 TA                                       
 
 BL                                    
 
 ET                                    
 
                                
 
                           
 
                          
 
                        
 
               
 
               
 
               
 
               
 
               
 
 IA  00      04  05      55  10          00  SO  Ac
 
 ED  05      -2  -2      .0              00  PE  ti
 
 NI  44      7-  6-      00              00  RS  ve
 
 SO  72      20  20                      56       
 
 NE  83      17  17                      24  FA    
 
  5  1                                   57  MI    
 
                                            LY    
 
 MG                                               
 
                                            DR    
 
 TA                                         UG    
 
 BL                                          
 
 ET                                       
 
                                       
 
                                       
 
                                
 
                           
 
                           
 
                           
 
                  
 
               
 
               
 
 TI  57      04  05      60  30          00  SO  Ac
 
 ZA  66      -2  -1      .0              00  PE  ti
 
 NI  40      4-  9-      00              00  RS  ve
 
 DI  50      20  20                      55       
 
 NE  31      17  17                      37  FA    
 
    8                                   39  MI    
 
 HC                                          LY    
 
 L                                                
 
 4                                           DR    
 
 MG                                         UG    
 
                                            
 
 TA                                       
 
 BL                                    
 
 ET                                    
 
                                
 
                           
 
                           
 
                           
 
                  
 
               
 
               
 
               
 
               
 
 CE  69      04  05      30  30          00  SO  Ac
 
 LE  09      -1  -1      .0              00  PE  ti
 
 CO  70      9-  2-      00              00  RS  ve
 
 XI  42      20  20                      55       
 
 B   20      17  17                      56  FA    
 
 10  7                                   40  MI    
 
 0                                           LY    
 
 MG                                               
 
                                            DR    
 
 CA                                         UG    
 
 PS                                          
 
 UL                                       
 
 E                                     
 
                                       
 
                                
 
                           
 
                           
 
                           
 
                  
 
               
 
               
 
              
 
 VE  00      03  04      18  20          00  SO  Ac
 
 NT  17      -2  -1      .0              00  PE  ti
 
 OL  30      0-  4-      00              00  RS  ve
 
 IN  68      20  20                      55       
 
    22      17  17                      37  FA    
 
 HF  0                                   40  MI    
 
 A                                           LY    
 
 90                                               
 
                                            DR    
 
 MC                                         UG    
 
 G                                           
 
 IN                                       
 
 HA                                    
 
 LE                                    
 
 R                              
 
                           
 
                           
 
                           
 
                  
 
               
 
               
 
               
 
               
 
              
 
 TI  57      03  04      60  30          00  SO  Ac
 
 ZA  66      -2  -1      .0              00  PE  ti
 
 NI  40      2-  4-      00              00  RS  ve
 
 DI  50      20  20                      55       
 
 NE  31      17  17                      37  FA    
 
    8                                   39  MI    
 
 HC                                          LY    
 
 L                                                
 
 4                                           DR    
 
 MG                                         UG    
 
                                            
 
 TA                                       
 
 BL                                    
 
 ET                                    
 
                                
 
                           
 
                           
 
                           
 
                  
 
               
 
               
 
               
 
               
 
 CI  16      03  04      20  10          00  SO  Ac
 
 IA  57      -2  -1      .0              00  PE  ti
 
 OF  10      2-  4-      00              00  RS  ve
 
 LO  41      20  20                      55       
 
 XA  25      17  17                      95  FA    
 
 CI  0                                   47  MI    
 
 N                                           LY    
 
 HC                                               
 
 L                                           DR    
 
 50                                         UG    
 
 0                                           
 
 MG                                       
 
                                      
 
 TA                                    
 
 B                              
 
                           
 
                           
 
                           
 
                  
 
               
 
               
 
               
 
               
 
              
 
 CE  69      03  04      30  30          00  SO  Ac
 
 LE  09      -1  -0      .0              00  PE  ti
 
 CO  70      5-  7-      00              00  RS  ve
 
 XI  42      20  20                      55       
 
 B   20      17  17                      56  FA    
 
 10  7                                   40  MI    
 
 0                                           LY    
 
 MG                                               
 
                                            DR    
 
 CA                                         UG    
 
 PS                                          
 
 UL                                       
 
 E                                     
 
                                       
 
                                
 
                           
 
                           
 
                           
 
                  
 
               
 
               
 
              
 
 CL  63      03  03      40  10          00  SO  Ac
 
 IN  30      -0  -3      .0              00  PE  ti
 
 DA  40      8-  1-      00              00  RS  ve
 
 MY  69      20  20                      55       
 
 CI  20      17  17                      82  FA    
 
 N   1                                   70  MI    
 
 HC                                          LY    
 
 L                                                
 
 15                                          DR    
 
 0                                          UG    
 
 MG                                          
 
                                         
 
 CA                                    
 
 PS                                    
 
 UL                             
 
 E                         
 
                           
 
                           
 
                  
 
               
 
               
 
               
 
               
 
               
 
              
 
 TI  57      02  03      60  30          00  SO  Ac
 
 ZA  66      -2  -1      .0              00  PE  ti
 
 NI  40      0-  7-      00              00  RS  ve
 
 DI  50      20  20                      55       
 
 NE  31      17  17                      37  FA    
 
    8                                   39  MI    
 
 HC                                          LY    
 
 L                                                
 
 4                                           DR    
 
 MG                                         UG    
 
                                            
 
 TA                                       
 
 BL                                    
 
 ET                                    
 
                                
 
                           
 
                           
 
                           
 
                  
 
               
 
               
 
               
 
               
 
 PA  00      02  03      30  30          00  SO  Ac
 
 RO  37      -0  -0      .0              00  PE  ti
 
 XE  87      8-  3-      00              00  RS  ve
 
 TI  00      20  20                      55       
 
 NE  29      17  17                      56  FA    
 
    3                                   39  MI    
 
 HC                                          LY    
 
 L                                                
 
 20                                          DR    
 
                                           UG    
 
 MG                                          
 
                                         
 
 TA                                    
 
 BL                                    
 
 ET                             
 
                           
 
                           
 
                           
 
                  
 
               
 
               
 
               
 
               
 
               
 
 CE  69      02  03      30  30          00  SO  Ac
 
 LE  09      -0  -0      .0              00  PE  ti
 
 CO  70      8-  3-      00              00  RS  ve
 
 XI  42      20  20                      55       
 
 B   20      17  17                      56  FA    
 
 10  7                                   40  MI    
 
 0                                           LY    
 
 MG                                               
 
                                            DR    
 
 CA                                         UG    
 
 PS                                          
 
 UL                                       
 
 E                                     
 
                                       
 
                                
 
                           
 
                           
 
                           
 
                  
 
               
 
               
 
              
 
 HY  00      02  03      60  30          00  SO  Ac
 
 DR  18      -0  -0      .0              00  PE  ti
 
 OX  50      8-  3-      00              00  RS  ve
 
 YZ  67      20  20                      55       
 
 IN  40      17  17                      56  FA    
 
 E   5                                   41  MI    
 
 PA                                          LY    
 
 M                                                
 
 25                                          DR    
 
                                           UG    
 
 MG                                          
 
                                         
 
 CA                                    
 
 P                                     
 
                                
 
                           
 
                           
 
                           
 
                  
 
               
 
               
 
               
 
              
 
 ES  00      01  02      30  30          00  SO  Ac
 
 TR  55      -1  -1      .0              00  PE  ti
 
 AD  50      8-  0-      00              00  RS  ve
 
 IO  88      20  20                      55       
 
 L   60      17  17                      39  FA    
 
 1   2                                   06  MI    
 
 MG                                          LY    
 
                                                 
 
 TA                                          DR    
 
 BL                                         UG    
 
 ET                                          
 
                                          
 
                                       
 
                                       
 
                                
 
                           
 
                           
 
                           
 
                  
 
               
 
 TI  57      01  02      60  30          00  SO  Ac
 
 ZA  66      -1  -1      .0              00  PE  ti
 
 NI  40      6-  0-      00              00  RS  ve
 
 DI  50      20  20                      55       
 
 NE  31      17  17                      37  FA    
 
    8                                   39  MI    
 
 HC                                          LY    
 
 L                                                
 
 4                                           DR    
 
 MG                                         UG    
 
                                            
 
 TA                                       
 
 BL                                    
 
 ET                                    
 
                                
 
                           
 
                           
 
                           
 
                  
 
               
 
               
 
               
 
               
 
 VE  00      01  02      18  20          00  SO  Ac
 
 NT  17      -1  -1      .0              00  PE  ti
 
 OL  30      6-  0-      00              00  RS  ve
 
 IN  68      20  20                      55       
 
    22      17  17                      37  FA    
 
 HF  0                                   40  MI    
 
 A                                           LY    
 
 90                                               
 
                                            DR    
 
 MC                                         UG    
 
 G                                           
 
 IN                                       
 
 HA                                    
 
 LE                                    
 
 R                              
 
                           
 
                           
 
                           
 
                  
 
               
 
               
 
               
 
               
 
              
 
 DI  16      01  02      60  30          00  SO  Ac
 
 CL  57      -1  -1      .0              00  PE  ti
 
 OF  10      6-  0-      00              00  RS  ve
 
 EN  20      20  20                      55       
 
 AC  11      17  17                      37  FA    
 
    1                                   41  MI    
 
 SO                                          LY    
 
 D                                                
 
 EC                                          DR    
 
                                           UG    
 
 75                                          
 
                                         
 
 MG                                    
 
                                      
 
 TA                             
 
 B                         
 
                           
 
                           
 
                  
 
               
 
               
 
               
 
               
 
               
 
              
 
 AM  16      01  02      30  30          00  SO  Ac
 
 IT  72      -1  -1      .0              00  PE  ti
 
 RI  90      6-  0-      00              00  RS  ve
 
 PT  17      20  20                      55       
 
 YL  30      17  17                      37  FA    
 
 IN  1                                   42  MI    
 
 E                                           LY    
 
 HC                                               
 
 L                                           DR    
 
 50                                         UG    
 
                                            
 
 MG                                       
 
                                      
 
 TA                                    
 
 B                              
 
                           
 
                           
 
                           
 
                  
 
               
 
               
 
               
 
               
 
              
 
 ME  00      07  07  0   21  6       WA  70  WE  Ac
 
 TH  60      -2  -2      .0          L-  41  ST  ti
 
 YL  34      8-  8-      00          MA  64     ve
 
 IA  59      20  20                  RT  7   RI    
 
 ED  31      10  10                         CH    
 
 NI  5                               PH      AR    
 
 SO                                  AR      D     
 
 LO                                  MA      M     
 
 NE                                  CY            
 
  4                                #            
 
                                        
 
 MG                         10         
 
                        04        
 
 DO                      93      
 
 SE                       
 
 PK                     
 
                       
 
                     
 
                  
 
                  
 
                  
 
                  
 
                  
 
               
 
               
 
     00      07  07  0   20  6       CA  67  WE  Ac
 
     59      -0  -0      .0          RL  06  ST  ti
 
     10      9-  9-      00          IS  75     ve
 
     34      20  20                  LE      RI    
 
     90      10  10                         CH    
 
     5                               DR      AR    
 
                                     UG      D     
 
                                            M     
 
                                     CO            
 
                                  MP            
 
                            AN            
 
                            Y          
 
                                 
 
                                 
 
                          
 
                        
 
                       
 
                     
 
               
 
               
 
              
 
 ME  00      07  07  0   21  6       CA  67  WE  Ac
 
 TH  78      -0  -0      .0          RL  06  ST  ti
 
 YL  15      9-  9-      00          IS  76     ve
 
 IA  02      20  20                  LE      RI    
 
 ED  20      10  10                         CH    
 
 NI  7                               DR      AR    
 
 SO                                  UG      D     
 
 LO                                         M     
 
 NE                                  CO            
 
  4                               MP            
 
                           AN            
 
 MG                         Y          
 
                                
 
 DO                              
 
 SE                       
 
 PK                     
 
                       
 
                     
 
                  
 
                  
 
                 
 
               
 
               
 
               
 
               
 
 NA  00      07  07  1   60  30      CA  67  WE  Ac
 
 IA  09      -0  -0      .0          RL  06  ST  ti
 
 OX  30      9-  9-      00          IS  77     ve
 
 EN  14      20  20                  LE      RI    
 
    90      10  10                         CH    
 
 50  5                               DR      AR    
 
 0                                   UG      D     
 
 MG                                         M     
 
                                    CO            
 
 TA                                MP            
 
 BL                         AN            
 
 ET                         Y          
 
                                 
 
                                 
 
                          
 
                        
 
                       
 
                     
 
                  
 
                  
 
                 
 
 MU  00      04  04      22  10      RI  39  WE  Ac
 
 PI  09      -3  -3      .0          TE  97  ST  ti
 
 RO  31      0-  0-      00             98     ve
 
 CI  01      20  20                  AI      RY    
 
 N   04      10  10                  D       AN    
 
 2%  2                               PH       B    
 
                                    AR            
 
 OI                                  MA            
 
 NT                                  CY            
 
 ME                                              
 
 NT                            03            
 
                               91         
 
                            4       
 
                            #       
 
                     03      
 
                   91   
 
                        
 
                        
 
                  
 
                  
 
               
 
               
 
               
 
               
 
               
 
 AZ  59      04  04      6.  5       RI  39  WE  Ac
 
 IT  76      -3  -3      00          TE  97  ST  ti
 
 HR  23      0-  0-      0              97     ve
 
 OM  06      20  20                  AI      RY    
 
 YC  00      10  10                  D       AN    
 
 IN  1                               PH       B    
 
                                    AR            
 
 25                                  MA            
 
 0                                   CY            
 
 MG                                             
 
                              03            
 
 TA                           91         
 
 BL                         4       
 
 ET                         #       
 
                     03      
 
                   91   
 
                        
 
                        
 
                  
 
                  
 
                  
 
                  
 
                  
 
               
 
               
 
 FA  00      04  04      30  15      RI  39  AH  Ac
 
 MO  17      -0  -0      .0          TE  72  ME  ti
 
 TI  25      5-  6-      00             61  D   ve
 
 DI  72      20  20                  AI      MU    
 
 NE  86      10  10                  D       SWANN    
 
    0                               PH      MM    
 
 20                                  AR      AD    
 
                                    MA       A    
 
 MG                                  CY            
 
                                                
 
 TA                            03            
 
 BL                            91         
 
 ET                         4       
 
                            #       
 
                     03      
 
                   91      
 
                           
 
                        
 
                  
 
                  
 
                  
 
                  
 
               
 
               
 
               
 
 HY  68      04  04      24  6       RI  39  AH  Ac
 
 DR  46      -0  -0      .0          TE  72  ME  ti
 
 OX  20      5-  6-      00             59  D   ve
 
 YZ  36      20  20                  AI      MU    
 
 IN  10      10  10                  D       SWANN    
 
 E   1                               PH      MM    
 
 HC                                  AR      AD    
 
 L                                   MA       A    
 
 25                                  CY            
 
                                               
 
 MG                            03            
 
                              91         
 
 TA                         4       
 
 BL                         #       
 
 ET                  03      
 
                   91      
 
                           
 
                        
 
                  
 
                  
 
                  
 
                  
 
                  
 
                  
 
               
 
 IA  00      04  04      42  12      RI  39  AH  Ac
 
 ED  60      -0  -0      .0          TE  72  ME  ti
 
 NI  35      6-  6-      00             57  D   ve
 
 SO  33      20  20                  AI      MU    
 
 NE  82      10  10                  D       HA    
 
    1                               PH      MM    
 
 10                                  AR      AD    
 
                                    MA       A    
 
 MG                                  CY            
 
                                                
 
 TA                            03            
 
 BL                            91         
 
 ET                         4       
 
                            #       
 
                     03      
 
                   91      
 
                           
 
                        
 
                  
 
                  
 
                  
 
                  
 
               
 
               
 
               
 
 LO  00      04  04      10  10      RI  39  AH  Ac
 
 RA  78      -0  -0      .0          TE  72  ME  ti
 
 TA  15      5-  6-      00             56  D   ve
 
 DI  07      20  20                  AI      MU    
 
 NE  70      10  10                  D       HA    
 
    1                               PH      MM    
 
 10                                  AR      AD    
 
                                    MA       A    
 
 MG                                  CY            
 
                                                
 
 TA                            03            
 
 BL                            91         
 
 ET                         4       
 
                            #       
 
                     03      
 
                   91      
 
                           
 
                        
 
                  
 
                  
 
                  
 
                  
 
               
 
               
 
               
 
 PE  00      03  03      59  2       RI  39  WE  Ac
 
 RM  47      -1  -1      .0          TE  50  ST  ti
 
 ET  25      6-  6-      00             39     ve
 
 HR  24      20  20                  AI      RI    
 
 IN  26      10  10                  D       CH    
 
    7                               PH      AR    
 
 1%                                  AR      D     
 
                                    MA      M     
 
 LO                                  CY            
 
 TI                                             
 
 ON                            03            
 
                               91         
 
                            4       
 
                            #       
 
                     03      
 
                   91      
 
                          
 
                        
 
                  
 
                  
 
               
 
               
 
               
 
               
 
               
 
 OX  00      02  02  00  15  3       RI  82  RU  Ac
 
 YC  40      -1  -2      .0          TE  15  SH  ti
 
 OD  60      6-  6-      00             74     ve
 
 ON  52      20  20                  AI      NE    
 
 -A  20      10  10                  D       IL    
 
 CE  1                               PH       C    
 
 TA                                  AR            
 
 MI                                  M             
 
 NO                                  #3            
 
 PH                                93            
 
 EN                         8             
 
                                      
 
 7.                              
 
 5-                              
 
 32                       
 
 5                   
 
                     
 
                     
 
                  
 
                 
 
               
 
               
 
               
 
               
 
              
 
     00      01  01  00  18  3       RI  81  RU  Ac
 
     40      -1  -2      .0          TE  69  SH  ti
 
     60      3-  8-      00             58     ve
 
     35      20  20                  AI      NE    
 
     70      10  10                  D       IL    
 
     5                               PH       C    
 
                                     AR            
 
                                     M             
 
                                     #3            
 
                                   93            
 
                            8             
 
                                       
 
                                 
 
                                 
 
                          
 
                     
 
                     
 
                     
 
               
 
              
 
 PE  00      01  01  00  40  10      RI  38  RU  Ac
 
 NI  09      -1  -2      .0          TE  86  SH  ti
 
 CI  31      5-  8-      00             02     ve
 
 LL  17      20  20                  AI      NE    
 
 IN  20      10  10                  D       IL    
 
    1                               PH       C    
 
 VK                                  AR            
 
                                    M             
 
 25                                  #3            
 
 0                                  91            
 
 MG                         4             
 
                                      
 
 TA                              
 
 BL                              
 
 ET                       
 
                     
 
                     
 
                     
 
                  
 
                 
 
               
 
               
 
               
 
               
 
     00      01  01  00  12  3       RI  38  RU  Ac
 
     40      -1  -2      .0          TE  86  SH  ti
 
     60      6-  8-      00             05     ve
 
     35      20  20                  AI      NE    
 
     70      10  10                  D       IL    
 
     5                               PH       C    
 
                                     AR            
 
                                     M             
 
                                     #3            
 
                                   91            
 
                            4             
 
                                       
 
                                 
 
                                 
 
                          
 
                     
 
                     
 
                     
 
               
 
              
 
     00      12  12  00  10  3       RI  38  WE  Ac
 
     40      -0  -1      .0          TE  46  ST  ti
 
     60      8-  7-      00             10     ve
 
     35      20  20                  AI      RI    
 
     70      09  09                  D       CH    
 
     5                               PH      AR    
 
                                     AR      D     
 
                                     M       M     
 
                                     #3            
 
                                   91            
 
                            4             
 
                                       
 
                                 
 
                                 
 
                          
 
                        
 
                       
 
                     
 
               
 
              
 
 PE  00      12  12  00  59  2       RI  38  WE  Ac
 
 RM  47      -0  -1      .0          TE  46  ST  ti
 
 ET  25      9-  7-      00             41     ve
 
 HR  24      20  20                  AI      RI    
 
 IN  26      09  09                  D       CH    
 
    7                               PH      AR    
 
 1%                                  AR      D     
 
                                    M       M     
 
 LO                                  #3            
 
 TI                                91            
 
 ON                         4             
 
                                       
 
                                 
 
                                 
 
                          
 
                        
 
                       
 
                     
 
                  
 
                 
 
 CY  00      12  12  00  20  6       RI  38  WE  Ac
 
 CL  37      -0  -1      .0          TE  46  ST  ti
 
 OB  80      8-  7-      00             11     ve
 
 EN  75      20  20                  AI      RI    
 
 ZA  11      09  09                  D       CH    
 
 IA  0                               PH      AR    
 
 IN                                  AR      D     
 
 E                                   M       M     
 
 10                                  #3            
 
                                  91            
 
 MG                         4             
 
                                      
 
 TA                              
 
 BL                              
 
 ET                       
 
                        
 
                       
 
                     
 
                  
 
                 
 
               
 
               
 
               
 
               
 
     00      12  12  00  15  3       RI  81  RU  Ac
 
     40      -0  -1      .0          TE  12  SH  ti
 
     60      2-  7-      00             93     ve
 
     35      20  20                  AI      NE    
 
     70      09  09                  D       IL    
 
     5                               PH       C    
 
                                     AR            
 
                                     M             
 
                                     #3            
 
                                   93            
 
                            8             
 
                                       
 
                                 
 
                                 
 
                          
 
                     
 
                     
 
                     
 
               
 
              
 
 PE  00      11  12  00  59  5       RI  38  WE  Ac
 
 RM  47      -1  -0      .0          TE  22  ST  ti
 
 ET  25      7-  3-      00             77     ve
 
 HR  24      20  20                  AI      RI    
 
 IN        09  09                  D       CH    
 
    7                               PH      AR    
 
 1%                                  AR      D     
 
                                    M       M     
 
 LO                                  #3            
 
 TI                                91            
 
 ON                         4             
 
                                       
 
                                 
 
                                 
 
                          
 
                        
 
                       
 
                     
 
                  
 
                 
 
 PE  00      11  11  00  40  10      RI  38  RU  Ac
 
 NI  09      -0  -1      .0          TE  05  SH  ti
 
 CI  31      3-  9-      00             63     ve
 
 LL  17      20  20                  AI      NE    
 
 IN  20      09  09                  D       IL    
 
    1                               PH       C    
 
 VK                                  AR            
 
                                    M             
 
 25                                  #3            
 
 0                                  91            
 
 MG                         4             
 
                                      
 
 TA                              
 
 BL                              
 
 ET                       
 
                     
 
                     
 
                     
 
                  
 
                 
 
               
 
               
 
               
 
               
 
     00      11  11  00  16  4       RI  80  RU  Ac
 
     40      -1  -1      .0          TE  79  SH  ti
 
     60      0-  9-      00             37     ve
 
     35      20  20                  AI      NE    
 
     70      09  09                  D       IL    
 
     5                               PH       C    
 
                                     AR            
 
                                     M             
 
                                     #3            
 
                                   93            
 
                            8             
 
                                       
 
                                 
 
                                 
 
                          
 
                     
 
                     
 
                     
 
               
 
              
 
     00      11  11  00  15  3       RI  38  RU  Ac
 
     40      -0  -1      .0          TE  05  SH  ti
 
     60      3-  9-      00             64     ve
 
     35      20  20                  AI      NE    
 
     70      09  09                  D       IL    
 
     5                               PH       C    
 
                                     AR            
 
                                     M             
 
                                     #3            
 
                                   91            
 
                            4             
 
                                       
 
                                 
 
                                 
 
                          
 
                     
 
                     
 
                     
 
               
 
              
 
 OX  00      10  11  00  15  3       RI  80  RU  Ac
 
 YC  40      -2  -0      .0          TE  62  SH  ti
 
 OD  60      9-  5-      00             84     ve
 
 ON  51      20  20                  AI      NE    
 
 E-  20      09  09                  D       IL    
 
 AC  1                               PH       C    
 
 ET                                  AR            
 
 AM                                  M             
 
 IN                                  #3            
 
 OP                                93            
 
 HE                         8             
 
 N                                     
 
 5-                              
 
 32                              
 
 5                        
 
                     
 
                     
 
                     
 
                  
 
                 
 
               
 
               
 
               
 
              
 
 OX  00      09  10  00  12  3       RI  37  RU  Ac
 
 YC  40      -2  -0      .0          TE  68  SH  ti
 
 OD  60      8-  8-      00             32     ve
 
 ON  51      20  20                  AI      NE    
 
 E-  20      09  09                  D       IL    
 
 AC  1                               PH       C    
 
 ET                                  AR            
 
 AM                                  M             
 
 IN                                  #3            
 
 OP                                91            
 
 HE                         4             
 
 N                                     
 
 5-                              
 
 32                              
 
 5                        
 
                     
 
                     
 
                     
 
                  
 
                 
 
               
 
               
 
               
 
              
 
     00      09  10  00  18  4       WA  44  RU  Ac
 
     40      -2  -0      .0          L-  76  SH  ti
 
     60      5-  8-      00          MA  47     ve
 
     35      20  20                  RT  6   NE    
 
     70      09  09                         IL    
 
     5                               PH       C    
 
                                     AR            
 
                                     MA            
 
                                     CY            
 
                                                
 
                            #4            
 
                            93         
 
                                   
 
                                 
 
                          
 
                     
 
                     
 
                     
 
               
 
               
 
               
 
 IA  60      09  09  00  30  30      RI  37  WE  Ac
 
 EN  25      -1  -2      .0          TE  49  ST  ti
 
 AT  80      1-  4-      00             19     ve
 
 AL  19      20  20                  AI      RI    
 
    60      09  09                  D       CH    
 
 19  1                               PH      AR    
 
                                    AR      D     
 
 TA                                  M       M     
 
 BL                                  #3            
 
 ET                                 91            
 
                            4             
 
                                       
 
                                 
 
                                 
 
                          
 
                        
 
                       
 
                     
 
                  
 
              
 
 PE  00      09  09  00  40  10      RI  37  RU  Ac
 
 NI  09      -1  -2      .0          TE  49  SH  ti
 
 CI  31      1-  4-      00             20     ve
 
 LL  17      20  20                  AI      NE    
 
 IN  20      09  09                  D       IL    
 
    1                               PH       C    
 
 VK                                  AR            
 
                                    M             
 
 25                                  #3            
 
 0                                  91            
 
 MG                         4             
 
                                      
 
 TA                              
 
 BL                              
 
 ET                       
 
                     
 
                     
 
                     
 
                  
 
                 
 
               
 
               
 
               
 
               
 
 OX  00      09  09  00  12  3       RI  79  RU  Ac
 
 YC  40      -0  -2      .0          TE  90  SH  ti
 
 OD  60      9-  4-      00             93     ve
 
 ON  51      20  20                  AI      NE    
 
 E-  20      09  09                  D       IL    
 
 AC  1                               PH       C    
 
 ET                                  AR            
 
 AM                                  M             
 
 IN                                  #3            
 
 OP                                93            
 
 HE                         8             
 
 N                                     
 
 5-                              
 
 32                              
 
 5                        
 
                     
 
                     
 
                     
 
                  
 
                 
 
               
 
               
 
               
 
              
 
     63      08  08  00  14  7       WA  69  GA  Ac
 
     30      -0  -2      .0          L-  83  IN  ti
 
     40      8-  8-      00          MA  14  EY  ve
 
     71      20  20                  RT  3        
 
     00      08  08                         MI    
 
     1                               PH      CH    
 
                                     AR      AE    
 
                                     MA      L     
 
                                     CY      S     
 
                                                
 
                            #5            
 
                            91         
 
                                   
 
                                 
 
                          
 
                        
 
                        
 
                       
 
               
 
               
 
               
 
 CE  00      07  07  00  28  7       CA  64  LIBBY  Ac
 
 PH  09      -0  -1      .0          RL  52  SE  ti
 
 AL  33      3-  7-      00          IS  42     ve
 
 EX  14      20  20                  LE      T.    
 
 IN  70      08  08                              
 
    5                               DR      LO    
 
 50                                  UG      RE    
 
 0                                          NZ    
 
 MG                                  IN      O     
 
                                  C       MD    
 
 CA                                      
 
 PS                                 LIBBY 
 
 UL                           SE 
 
 E                               
 
                          
 
                        
 
                        
 
                        
 
                    
 
                  
 
                  
 
                  
 
              
 
     00      05  06  00  20  5       CL  17  No  Ac
 
     05      -1  -0      .0          IN  09  t   ti
 
     44      9-  5-      00          IC  62  Av  ve
 
     65      20  20                         ai    
 
     02      08  08                  PH      la    
 
     9                               AR      bl    
 
                                     MA      e     
 
                                     CY            
 
                                                   
 
                                                 
 
                                          
 
                                       
 
                                 
 
                                 
 
                          
 
                       
 
                     
 
                  
 
     00      05  05  00  30  7       CL  17  No  Ac
 
     05      -0  -2      .0          IN  02  t   ti
 
     44      8-  2-      00          IC  90  Av  ve
 
     65      20  20                         ai    
 
     02      08  08                  PH      la    
 
     9                               AR      bl    
 
                                     MA      e     
 
                                     CY            
 
                                                   
 
                                                 
 
                                          
 
                                       
 
                                 
 
                                 
 
                          
 
                       
 
                     
 
                  
 
 OX  00      05  05  00  30  4       RI  73  No  Ac
 
 YC  40      -0  -2      .0          TE  20  t   ti
 
 OD  60      5-  2-      00             17  Av  ve
 
 ON  51      20  20                  AI      ai    
 
 E-  20      08  08                  D       la    
 
 AC  1                               PH      bl    
 
 ET                                  AR      e     
 
 AM                                  M             
 
 IN                                  #3            
 
 OP                                93            
 
 HE                         8             
 
 N                                     
 
 5-                              
 
 32                              
 
 5                        
 
                       
 
                     
 
                     
 
                  
 
                 
 
               
 
               
 
               
 
              
 
     00      04  05  00  30  8       CL  16  No  Ac
 
     40      -2  -0      .0          IN  94  t   ti
 
     60      5-  8-      00          IC  74  Av  ve
 
     35      20  20                         ai    
 
     70      08  08                  PH      la    
 
     5                               AR      bl    
 
                                     MA      e     
 
                                     CY            
 
                                                   
 
                                                 
 
                                          
 
                                       
 
                                 
 
                                 
 
                          
 
                       
 
                     
 
                  
 
     00      04  04  00  20  5       CL  16  No  Ac
 
     40      -1  -2      .0          IN  90  t   ti
 
     60      8-  4-      00          IC  64  Av  ve
 
     35      20  20                         ai    
 
     70      08  08                  PH      la    
 
     5                               AR      bl    
 
                                     MA      e     
 
                                     CY            
 
                                                   
 
                                                 
 
                                          
 
                                       
 
                                 
 
                                 
 
                          
 
                       
 
                     
 
                  
 
                                                                                
                                                                                
                                                                                
                                                                                
                                                                                
                                                                                
                                                                                
                                                                                
                                        
 
Results
                      
 
 
 Labs                
 
 
 
 
 Lab   Lab   Date    Result  Refere  Interp  Status  Commen
 
 Order   Detail                  nces   retati          t     
 
                                 Range   on                    
 
                                                            
 
                                  
 
                
 
 
 
 
 Differential panel, method unspecified - (2017 16:15)
 
                 
 
 
 
 
          LYMPH      25 %     10% -  Normal   complet
 
                   017             50%            ed     
 
                 16:15                                      
 
                                    
 
                  
 
         
 
          Platele    NORMAL                     complet
 
          ts   017                              ed     
 
        [Presen  16:15                                      
 
  ce] in                                       
 
  Blood                              
 
  by             
 
  Light      
 
  microsc     
 
  opy         
 
              
 
              
 
                                                                                
                                     
 
Procedures
                      
 
 
 Procedure  DOS        Code       Location   Performer  Comment  
 
                                                                 
 
                                                 
 
 NEEDLE     46322      [facility]   PAVEZ               
 
 EMG EA   7                     PHYSICIAN                     
 
 EXTREMTY                     S GROUP          
 
 W/PARASPI                 
 
 NL AREA              
 
 COMPLETE      
 
               
 
              
 
 NERVE     97291      [facility]   PAVEZ               
 
 CONDUCTIO  7                     PHYSICIAN                     
 
 N STUDIES                    S GROUP          
 
  3-4                  
 
 STUDIES               
 
               
 
             
 
 [place]     [address]      [facility]            
 
 HEPATITIS  7                     [facility]           
 
  B                     INC        INC       
 
 SURFACE                            
 
 ANTIGEN               
 
               
 
             
 
 SEDIMENTA    78638      [facility]            
 
 TION RATE  7                     [facility]           
 
  RBC                     INC        INC       
 
 NON-AUTOM                           
 
 ATED                  
 
               
 
          
 
 [place]     [address]      [facility]            
 
 THYROID   7                     [facility]           
 
 STIMULATI                    INC        INC       
 
 NG                            
 
 HORMONE              
 
 TSH           
 
               
 
         
 
 [place]     [address]      [facility]            
 
 THYROXINE  7                     [facility]           
 
  TOTAL                       INC        INC       
 
                                     
 
                    
 
 [place]     [address]      [facility]            
 
 BLOOD/URI  7                     [facility]           
 
 C ACID                       INC        INC       
 
                                     
 
                    
 
 RHEUMATOI    72469      [facility]            
 
 D FACTOR   7                     [facility]           
 
 QUANTITAT                    INC        INC       
 
 CLARISSE                                 
 
                       
 
         
 
 COMPREHEN    71014      [facility]            
 
 SIVE   7                     [facility]           
 
 METABOLIC                    INC        INC       
 
  PANEL                              
 
                       
 
            
 
 HEPATITIS    67167      [facility]            
 
  A   7                     [facility]           
 
 ANTIBODY                     INC        INC       
 
 HAAB                                
 
                       
 
          
 
 ANTINUCLE    60324      [facility]            
 
 AR   7                     [facility]           
 
 ANTIBODIE                    INC        INC       
 
 S GARETT                               
 
                       
 
           
 
 HEPATITIS    06171      [facility]            
 
  C   7                     [facility]           
 
 ANTIBODY                     INC        INC       
 
                                     
 
                      
 
 HEMOGLOBI    72096      [facility]            
 
 N   7                     [facility]           
 
 GLYCOSYLA                    INC        INC       
 
 LESLYE A1C                             
 
                       
 
             
 
 BLOOD     07821      [facility]            
 
 COUNT   7                     [facility]           
 
 COMPLETE                     INC        INC       
 
 AUTO&AUTO                           
 
  DIFRNTL              
 
 WBC           
 
               
 
         
 
 HEPATITIS    52478      [facility]            
 
  B CORE   7                     [facility]           
 
 ANTIBODY                     INC        INC       
 
 HBCAB                            
 
 TOTAL                 
 
               
 
           
 
 SUSCEPTIB    59771      [facility]            
 
 LTY STDY   7                     [facility]   [facility]           
 
 ANTIMICRB                    HOLDINGS   HOLDINGS  
 
 IAL                            
 
 MICRO/AGA                       
 
 R DILUTJ      
 
               
 
              
 
 CUL BACT     31368      [facility]   [facility]            
 
 XCPT   7                     [facility]IEL   OTTONIEL           
 
 URINE                     HOLDINGS   HOLDINGS  
 
 BLOOD/STO                           
 
 OL                        
 
 AEROBIC      
 
 ISOL          
 
               
 
          
 
 CUL BACT     73096      [facility]   [facility]            
 
 AEROBIC   7                     OTTONIEL   OTTONIEL           
 
 ADDL                     HOLDINGS   HOLDINGS  
 
 METHS                            
 
 DEFINITIV                       
 
 E EA ISOL     
 
               
 
               
 
 RADEX     38289      [facility]   7                     [facility] 
 
 MINIMUM 3                           
 
  VIEWS                            
 
               
 
            
 
 CYTP C/V     75865      BIO   BIO            
 
 AUTO THIN  6                     REFERNCE   REFERNCE           
 
  LYR                     LABORATOR  LABORATOR 
 
 PREPJ SCR      IES        IES       
 
  MNL                          
 
 RESCR              
 
 PHYS          
 
               
 
          
 
 IADNA     30356      BIO   BIO            
 
 CHLAMYDIA  6                     REFERNCE   REFERNCE           
 
                      LABORATOR  LABORATOR 
 
 TRACHOMAT      IES        IES       
 
 IS                          
 
 AMPLIFIED             
 
  PROBE TQ     
 
               
 
               
 
 IADNA     42908      BIO   BIO            
 
 TRICHOMON  6                     REFERNCE   REFERNCE           
 
 AS                     LABORATOR  LABORATOR 
 
 VAGINALIS      IES        IES       
 
                           
 
 AMPLIFIED             
 
  PROBE      
 
 TECH          
 
               
 
          
 
 CULTURE     92601      [facility]            
 
 CHLAMYDIA  6                     LUCÍA ZAMORA  LUCIANO                
 
  ANY                                          
 
 SOURCE                        
 
               
 
            
 
 IADNA NOS    81326      BIO   BIO            
 
    6                     REFERNCE   REFERNCE           
 
 AMPLIFIED                    LABORATOR  LABORATOR 
 
  PROBE TQ      IES        IES       
 
  EACH                          
 
 ORGANISM              
 
               
 
              
 
 IADNA     25126      [facility] AL[facility]            
 
 [place]ISS[place]  6                     LUCÍA ZAOMRA  GLORY                
 
                                           
 
 GONORRHOE                     
 
 AE DIRECT     
 
  PROBE TQ     
 
               
 
               
 
 IADNA     61439      BIO   BIO            
 
 [place]  6                     REFERNCE   REFERNCE           
 
                      LABORATOR  LABORATOR 
 
 GONORRHOE      IES        IES       
 
 AE                          
 
 AMPLIFIED             
 
  PROBE TQ     
 
               
 
               
 
 PHYSICAL   04-  10068      [facility]            
 
 THERAPY   5                     [facility]           
 
 EVALUATIO                    INC        INC       
 
 N                                   
 
                       
 
       
 
 OTHER     6562       [facility]            
 
 REMOVAL   5                     [facility]           
 
 OF                       INC        INC       
 
 REMAINING                           
 
  OVARY              
 
 AND TUBE      
 
               
 
              
 
 OTHER     5459       [facility]            
 
 LYSIS OF   5                     [facility]           
 
 PERITONEA                      INC        INC       
 
 L                            
 
 ADHESIONS             
 
               
 
               
 
 LEVEL IV     51917      [facility]  [facility]           
 
 SURG   5                      LLC                          
 
 PATHOLOGY                                         
 
              
 
 GROSS&LIDA     
 
 ROSCOPIC      
 
 EXAM          
 
               
 
          
 
 ANESTHESI    97239      [facility]  ROBERT GONZALO           
 
 A   5                      ANESTH                      
 
 INTRAPERI                    OF THE             
 
 TONEAL       BLUE       
 
 LOWER ABD               
 
  W/LAPS           
 
 NOS           
 
               
 
         
 
 SALPINGO-    44230      [facility]   MAHOGANY TOD            
 
 OOPHORECT  5                     PHYSICIAN                     
 
 KARLA                     S GROUP             
 
 COMPL/PRT                 
 
 L UNI/BI              
 
 SPX           
 
               
 
         
 
 BLOOD     49710      [facility]            
 
 COUNT   5                     [facility]           
 
 COMPLETE                     INC        INC       
 
 AUTO&AUTO                           
 
  DIFRNTL              
 
 WBC           
 
               
 
         
 
 [place]     14238      [facility]            
 
 TRANSVAGI  5                     [facility]           
 
 NAL                          INC        INC       
 
                                     
 
                 
 
 URNLS DIP    70371      [facility]            
 
    5                     [facility]           
 
 STICK/TAB                    INC        INC       
 
 LET                            
 
 REAGENT              
 
 AUTO      
 
 MICROSCOP     
 
 Y             
 
               
 
       
 
 SMR PRIM     52010      [facility]            
 
 SRC WET   5                     LUCÍA CASTREJON MD          
 
 [facility] AGT                            
 
               
 
              
 
 [place]     [address]67      [facility]            
 
 TRANSVAGI  5                     [facility]           
 
 NAL                          INC        INC       
 
                                     
 
                 
 
 INJECTION          [facility]STRE[facility]            
 
    5                     LUCÍA ZAMORA  LUCIANO                
 
 PROGESTER                                         
 
 ONE PER                      
 
 50 MG         
 
               
 
           
 
 THERAPEUT    13946      [facility]            
 
 IC   5                     LUCÍA ZAMORA  LUCIANO                
 
 PROPHYLAC                                         
 
 TIC/DX                      
 
 INJECTION     
 
  SUBQ/IM      
 
               
 
              
 
 BLOOD     00133      [facility]            
 
 COUNT   5                     [facility]           
 
 COMPLETE                     INC        INC       
 
 AUTO&AUTO                           
 
  DIFRNTL              
 
 WBC           
 
               
 
         
 
 COLLECTIO    44711      [facility]            
 
 N VENOUS   5                     [facility]           
 
 BLOOD                     INC        INC       
 
 VENIPUNCT                           
 
 URE                   
 
               
 
         
 
 US PELVIC    65992      [facility]            
 
    5                     [facility]           
 
 NONOBSTET                    INC        INC       
 
 [place]                            
 
 REAL-TIME             
 
  IMAGE      
 
 COMPLETE      
 
               
 
              
 
 HOSPITAL           [facility]            
 
 OBSERVATI  5                     [facility]           
 
 ON                     INC        INC       
 
 SERVICE                            
 
 PER HOUR              
 
               
 
              
 
 HOSPITAL   02-        [facility]            
 
 OBSERVATI  5                     [facility]           
 
 ON                     INC        INC       
 
 SERVICE                            
 
 PER HOUR              
 
               
 
              
 
 BLOOD   02-  43664      [facility]            
 
 COUNT   5                     [facility]           
 
 HEMATOCRI                    INC        INC       
 
 T                                   
 
                       
 
       
 
 LEVEL V   02-  15149      [facility]  [facility]            
 
 SURG   5                      LLC       [place]                
 
 PATHOLOGY                                         
 
                  
 
 GROSS&LIDA     
 
 ROSCOPIC      
 
 EXAM          
 
               
 
          
 
 INJECTION  02-        [facility]            
 
    5                     [facility]           
 
 ONDANSETR                    INC        INC       
 
 ON HCL                            
 
 PER 1 MG              
 
               
 
              
 
 THERAPEUT  02-  20525      [facility]            
 
 IC   5                     [facility]           
 
 INJECTION                    INC        INC       
 
  IV PUSH                            
 
 EACH NEW              
 
 DRUG          
 
               
 
          
 
 INJECTION  02-        [facility]            
 
    5                     [facility]           
 
 ACETAMINO                    INC        INC       
 
 PHEN 10                            
 
 MG                    
 
               
 
        
 
 IV   02-  56622      [facility]            
 
 INFUSION   5                     [facility]           
 
 THERAPY/P                    INC        INC       
 
 ROPHYLAXI                           
 
 S /DX 1ST             
 
  TO 1 HR      
 
               
 
              
 
 ANESTHESI  02-  81051      [facility] VAGINAL  5                      ANESTH   SHE                
 
                      OF THE             
 
 HYSTERECT      BLUE           
 
 KARLA INCL                
 
 BIOPSY             
 
               
 
            
 
 URNLS DIP  02-  19861      [facility]            
 
    5                     [facility]           
 
 STICK/TAB                    INC        INC       
 
 LET                            
 
 REAGENT              
 
 AUTO      
 
 MICROSCOP     
 
 Y             
 
               
 
       
 
 COLLECTIO  02-  15798      [facility]            
 
 N VENOUS   5                     [facility]           
 
 BLOOD                     INC        INC       
 
 VENIPUNCT                           
 
 URE                   
 
               
 
         
 
 VAGINAL   02-  58954      [facility]            
 
 HYSTERECT  5                     [facility]           
 
 KARLA                     INC        INC       
 
 UTERUS                            
 
 250 GM/<              
 
               
 
              
 
 UNLISTED   02-  89557      [facility]            
 
 PX FEMALE  5                     [facility]           
 
  GENITAL                     INC        INC       
 
 SYSTEM                            
 
 NONOBSTET             
 
 RICAL         
 
               
 
           
 
 BLOOD   02-  25366      [facility]            
 
 COUNT   5                     [facility]           
 
 HEMOGLOBI                    INC        INC       
 
 N                                   
 
                       
 
       
 
 THERAPEUT  02-  33681      [facility]            
 
 IC   5                     [facility]           
 
 PROPHYLAC                    INC        INC       
 
 TIC/DX                            
 
 INJECTION             
 
  SUBQ/IM      
 
               
 
              
 
 URINE     35793      [facility]            
 
 PREGNANCY  5                     [facility]           
 
  TEST                     INC        INC       
 
 VISUAL                            
 
 COLOR              
 
 CMPRSN      
 
 METHS         
 
               
 
           
 
 BLOOD     48668      [facility]            
 
 COUNT   5                     [facility]           
 
 COMPLETE                     INC        INC       
 
 AUTO&AUTO                           
 
  DIFRNTL              
 
 WBC           
 
               
 
         
 
 COLLECTIO    45877      [facility]            
 
 N VENOUS   5                     [facility]           
 
 BLOOD                     INC        INC       
 
 VENIPUNCT                           
 
 URE                   
 
               
 
         
 
 URNLS DIP    99530      [facility]            
 
    5                     [facility]           
 
 STICK/TAB                    INC        INC       
 
 LET                            
 
 REAGENT              
 
 AUTO      
 
 MICROSCOP     
 
 Y             
 
               
 
       
 
 SMR PRIM     22568      [facility]RE[facility]            
 
 SRC WET   5                     [facility]                
 
 [place]                                          
 
 NFCT AGT                      
 
               
 
              
 
 RADIOLOGI    11922      [facility] [place]   SCALF TANIA           
 
 C EXAM   5                     RADIOLOGY                     
 
 CHEST 2                                          
 
 VIEWS                  
 
 FRONTAL&L     
 
 ATERAL        
 
               
 
            
 
 IMMUNOASS    40126      [facility]   [facility]            
 
 AY TUMOR   5                     [facility]           
 
 ANTIGEN                     INC        INC       
 
 QUANTITAT                           
 
 CLARISSE                   
 
               
 
         
 
 COLLECTIO    83980      [facility]   [facility]            
 
 N VENOUS   5                     [facility]           
 
 BLOOD                     INC        INC       
 
 VENIPUNCT                           
 
 URE                   
 
               
 
         
 
 US   01-  03610      [facility]            
 
 TRANSVAGI  5                     [facility]           
 
 NAL                          INC        INC       
 
                                     
 
                 
 
 HANDLG&/O    04901      [facility]RE[facility]            
 
 R CONVEY   5                     [facility]  [facility]                
 
 OF SPEC                                          
 
 FOR TR                      
 
 OFFICE TO     
 
  LAB          
 
               
 
          
 
 IADNA     78909      [facility] CASTREJ[facility]            
 
 NEISSERIA  5                     [facility]                
 
                                           
 
 GONORRHOE                     
 
 AE DIRECT     
 
  PROBE TQ     
 
               
 
               
 
 URINLS     63681      [facility]RE[facility]            
 
 DIP   5                     [facility]  [facility]                
 
 STICK/TAB                                         
 
 LET                      
 
 REAGNT      
 
 NON-AUTO      
 
 MICRSCPY      
 
               
 
              
 
 CULTURE     03860      [facility] CASTRE[facility]            
 
 CHLAMYDIA  5                     [facility]  [facility]                
 
  ANY                                          
 
 SOURCE                        
 
               
 
            
 
 CT THORAX    32339      [facility]            
 
    4                     [facility]           
 
 W/CONTRAS                    INC        INC       
 
 T                            
 
 MATERIAL              
 
               
 
              
 
 LOCM           [facility]            
 
 [phone]   4                     [facility]           
 
 MG/ML                     INC        INC       
 
 IODINE                            
 
 CONCENTRA             
 
 TION PER      
 
 ML            
 
               
 
        
 
 RADIOLOGI    60431      [facility]            
 
 C EXAM   4                     [facility]           
 
 CHEST 2                     INC        INC       
 
 VIEWS                            
 
 FRONTAL&L             
 
 ATERAL        
 
               
 
            
 
 ECG     38310      [facility]            
 
 ROUTINE   4                     [facility]   [place]                
 
 ECG                     EMERGENCY            
 
 W/LEAST        PHYSI         
 
 12 LDS                
 
 I&R ONLY             
 
               
 
              
 
 HOSPITAL           [facility]            
 
 OUTPATIEN  4                     [facility]           
 
 T CLIN                     INC        INC       
 
 VISIT                            
 
 ASSESS &              
 
 MGMT PT       
 
               
 
             
 
 IV   10-  02230      [facility]            
 
 INFUSION   4                     [facility]           
 
 THERAPY/P                    INC        INC       
 
 ROPHYLAXI                           
 
 S /DX 1ST             
 
  TO 1 HR      
 
               
 
              
 
 CREATININ  10-  65096      [facility]            
 
 E BLOOD    4                     [facility]           
 
                              INC        INC       
 
                                   
 
             
 
 IV   10-  09979      [facility]            
 
 INFUSION   4                     [facility]           
 
 THERAPY/P                    INC        INC       
 
 ROPHYLAXI                           
 
 S /DX 1ST             
 
  TO 1 HR      
 
               
 
              
 
 DRUG   10-  96621      [facility]            
 
 SCREEN   4                     [facility]           
 
 QUANTITAT                    INC        INC       
 
 CLARISSE                            
 
 VANCOMYCI             
 
 N             
 
               
 
       
 
 IV   10-  51849      [facility]            
 
 INFUSION   4                     [facility]           
 
 THERAPY/P                    INC        INC       
 
 ROPHYLAXI                           
 
 S /DX 1ST             
 
  TO 1 HR      
 
               
 
              
 
 IV   10-  59029      [facility]            
 
 INFUSION   4                     [facility]   [facility]           
 
 THERAPY/P                    INC        INC       
 
 ROPHYLAXI                           
 
 S /DX 1ST             
 
  TO 1 HR      
 
               
 
              
 
 IV   10-  77481      [facility]            
 
 INFUSION   4                     [facility]           
 
 THERAPY                     INC        INC       
 
 PROPHYLAX                           
 
 IS/DX EA              
 
 HOUR          
 
               
 
          
 
 RADIOLOGI  10-  99531      [facility] NASH            
 
 C EXAM   4                     [facility]             
 
 CHEST 2                     INC                  
 
 VIEWS                         
 
 FRONTAL&L         
 
 ATERAL        
 
               
 
            
 
 CATHETER   10-        [facility]            
 
 INFUS   4                     [facility]           
 
 INSRT                     INC        INC       
 
 PERIPHERA                           
 
 LLY              
 
 CNTRLLY/M     
 
 IDLN          
 
               
 
          
 
 INSJ PRPH  10-  70777      [facility]            
 
  CVC W/O   4                     [facility]           
 
 SUBQ                     INC        INC       
 
 PORT/                            
 
 AGE 3              
 
 YR/>          
 
               
 
          
 
 INCISION   10-  33394      [facility]   CLEMENTE            
 
 &   4                     PHYSICIAN  [place]                
 
 DRAINAGE                     S GROUP              
 
 ABSCESS                      
 
 SIMPLE/SI             
 
 NGLE          
 
               
 
          
 
 IV   10-  04099      [facility]            
 
 INFUSION   4                     [facility]           
 
 THERAPY/P                    INC        INC       
 
 ROPHYLAXI                           
 
 S /DX 1ST             
 
  TO 1 HR      
 
               
 
              
 
 IV   10-  10542      [facility]            
 
 INFUSION   4                     [facility]           
 
 THERAPY/P                    INC        INC       
 
 ROPHYLAXI                           
 
 S /DX 1ST             
 
  TO 1 HR      
 
               
 
              
 
 IV   10-  68705      [facility] NUNEZ            
 
 INFUSION   4                     [facility]   [facility]           
 
 THERAPY                     INC        INC       
 
 PROPHYLAX                           
 
 IS/DX EA              
 
 HOUR          
 
               
 
          
 
 DRUG   10-  12263      [facility]            
 
 SCREEN   4                     [facility]   [facility]           
 
 QUANTITAT                    INC        INC       
 
 CLARISSE                            
 
 VANCOMYCI             
 
 N             
 
               
 
       
 
 IV   10-  06278      [facility]UNEZ            
 
 INFUSION   4                     [facility]           
 
 THERAPY                     INC        INC       
 
 PROPHYLAX                           
 
 IS/DX EA              
 
 HOUR          
 
               
 
          
 
 IV   10-  24210      [facility] NUNEZ            
 
 INFUSION   4                     [facility]           
 
 THERAPY/P                    INC        INC       
 
 ROPHYLAXI                           
 
 S /DX 1ST             
 
  TO 1 HR      
 
               
 
              
 
 IV   10-  86513      [facility]            
 
 INFUSION   4                     [facility]           
 
 THERAPY/P                    INC        INC       
 
 ROPHYLAXI                           
 
 S /DX 1ST             
 
  TO 1 HR      
 
               
 
              
 
 IV   10-  22755      [facility]            
 
 INFUSION   4                     [facility]           
 
 THERAPY                     INC        INC       
 
 PROPHYLAX                           
 
 IS/DX EA              
 
 HOUR          
 
               
 
          
 
 CREATININ  10-  45492      [facility]            
 
 E BLOOD    4                     [facility]           
 
                              INC        INC       
 
                                   
 
             
 
 [place]   10-  05705      [facility] NUNEZ            
 
 UREA   4                     [facility]           
 
 NITROGEN                     INC        INC       
 
 QUANTITAT                           
 
 CLARISSE                   
 
               
 
         
 
 CUL BACT   10-  85458      [facility]            
 
 AEROBIC   4                     [facility]           
 
 ADDL                     INC        INC       
 
 METHS                            
 
 DEFINITIV             
 
 E EA ISOL     
 
               
 
               
 
 SUSCEPTIB  10-  36862      [facility]            
 
 LTY STDY   4                     [facility]           
 
 ANTIMICRB                    INC        INC       
 
 IAL                            
 
 MICRO/AGA             
 
 R DILUTJ      
 
               
 
              
 
 HEMOGLOBI    08565      [facility]            
 
 N   4                     [facility]           
 
 GLYCOSYLA                    INC        INC       
 
 LESLYE A1C                             
 
                       
 
             
 
 HEPATITIS    11388      [facility]UNEZ            
 
  B CORE   4                     [facility]           
 
 ANTIBODY                     INC        INC       
 
 HBCAB                            
 
 TOTAL                 
 
               
 
           
 
 HEPATITIS    51731      [facility] NATHAN SURF   4                     [facility]           
 
 ANTIBODY                     INC        INC       
 
 HBSAB                               
 
                       
 
           
 
 GENERAL     28034      [facility]            
 
 HEALTH   4                     [facility]           
 
 PANEL                        INC        INC       
 
                                     
 
                   
 
 HEPATITIS    38345      [facility]EZ            
 
  A   4                     [facility]           
 
 ANTIBODY                     INC        INC       
 
 HAAB                                
 
                       
 
          
 
 HEPATITIS    27310      [facility]UNEZ            
 
  C   4                     [facility]           
 
 ANTIBODY                     INC        INC       
 
                                     
 
                      
 
 LIPID     50832      [facility]            
 
 PANEL      4                     [facility]           
 
                              INC        INC       
 
                                 
 
             
 
 RADEX     31927      [facility]            
 
 SPINE   4                     [facility]           
 
 LUMBOSACR                    INC        INC       
 
 AL                            
 
 MINIMUM 4             
 
  VIEWS        
 
               
 
            
 
 [place]     [address]      [facility] NUNEZ            
 
 VITAMIN A  4                     [facility]           
 
                              INC        INC       
 
                                     
 
             
 
 [place]     [address]      [facility] NUNEZ            
 
 THYROXINE  4                     [facility]           
 
  TOTAL                       INC        INC       
 
                                     
 
                    
 
 CYANOCOBA    88637      [facility] NUNEZ            
 
 CHELSEA   4                     [facility]           
 
 VITAMIN                     INC        INC       
 
 B-12                                
 
                       
 
          
 
 [place]     [address]      [facility] NUNEZ            
 
 HEPATITIS  4                     [facility]           
 
  B                     INC        INC       
 
 SURFACE                            
 
 ANTIGEN               
 
               
 
             
 
 OPHTH     05532      PATI KRUEGER,           
 
 MEDICAL   0                      [facility]           
 
 XM&JULIO CESAR                     W          W         
 
 KEN                            
 
 NEW PT          
 
 1/> VST       
 
               
 
             
 
 DETERMINA    69972      PATI KRUEGER TION   0                      [facility]           
 
 REFRACTIV                    W          W         
 
 E STATE                             
 
                   
 
             
 
 LIPID     [id number]      [facility]            
 
 PANEL      0                     [facility] 
 
                                 
 
                         
 
 COLLECTIO    44447      [facility]            
 
 N VENOUS   0                     [facility] 
 
 VENIPUNCT                           
 
 URE                               
 
               
 
         
 
 SERVICES     98427      [facility]            
 
 [facility]   9                     [facility]  [facility]           
 
 OFFICE                                          
 
 OTH/THN                            
 
 REG SCHED     
 
  HOURS        
 
               
 
            
 
 COLLECTIO    18126      [facility]            
 
 N VENOUS   9                     [facility] 
 
 VENIPUNCT                           
 
 URE                               
 
               
 
         
 
 LIPID     [id number]      [facility]            
 
 PANEL      9                     [facility] 
 
                                 
 
                         
 
 GENERAL     74908      [facility]   9                     [facility] 
 
                                     
 
                               
 
 URNLS DIP    39194      [facility]UNEZ            
 
    8                     [facility]           
 
 STICK/TAB                    INC        INC       
 
 LET                            
 
 REAGENT              
 
 AUTO      
 
 MICROSCOP     
 
 Y             
 
               
 
       
 
 COMPREHEN    95878      [facility]EZ            
 
 SIVE   8                     [facility]           
 
 METABOLIC                    INC        INC       
 
  PANEL                              
 
                       
 
            
 
 BLOOD     19825      [facility] NUNEZ            
 
 COUNT   8                     [facility]           
 
 COMPLETE                     INC        INC       
 
 AUTO&AUTO                           
 
  DIFRNTL              
 
 WBC           
 
               
 
         
 
 URINE     23874      [facility]UNEZ            
 
 PREGNANCY  8                     [facility]   [facility]           
 
  TEST                     INC        INC       
 
 VISUAL                            
 
 COLOR              
 
 CMPRSN      
 
 METHS         
 
               
 
           
 
 IV NFS     67733      [facility]            
 
 THER   8                     [facility]           
 
 PROPH/DX                     INC        INC       
 
 1ST >1 HR                           
 
                       
 
               
 
 [place]     [address]      [facility] NUNEZ            
 
 AMYLASE    8                     [facility]           
 
                              INC        INC       
 
                                   
 
             
 
 CULTURE     88935      [facility]            
 
 BACTERIAL  8                     [facility]           
 
                      INC        INC       
 
 QUANTTATI                           
 
 VE COLONY             
 
  COUNT      
 
 URINE         
 
               
 
           
 
 [place]     [address]      [facility]EZ            
 
 LIPASE     8                     [facility]           
 
                              INC        INC       
 
                                  
 
             
 
 RADEX     39338      [facility]            
 
 HAND   8                     CO   CO           
 
 [address]  
 
  VIEWS                              
 
                                 
 
            
 
 [place]     [address]      [facility] NUNEZ            
 
 LIPASE     8                     [facility]           
 
                              INC        INC       
 
                                  
 
             
 
 BLOOD     05127      [facility] NUNEZ            
 
 COUNT   8                     [facility]           
 
 COMPLETE                     INC        INC       
 
 AUTO&AUTO                           
 
  DIFRNTL              
 
 WBC           
 
               
 
         
 
 [place]     [address]      [facility] NUNEZ            
 
 AMYLASE    8                     [facility]           
 
                              INC        INC       
 
                                   
 
             
 
 RADEX ABD    87439      [place],            
 
  COMPL   8                     MEDICAL   [place] [facility]           
 
 AQT ABD                     IMAGING             
 
 W/S/E/D       ASSOCIATE           
 
 VIEWS 1     S          
 
 VIEW CH                 
 
                 
 
             
 
 COMPREHEN    08331      [facility]EZ            
 
 SIVE   8                     [facility]           
 
 METABOLIC                    INC        INC       
 
  PANEL                              
 
                       
 
            
 
 URNLS DIP    84334      [facility]UNEZ            
 
    8                     [facility]           
 
 STICK/TAB                    INC        INC       
 
 LET                            
 
 REAGENT              
 
 AUTO      
 
 MICROSCOP     
 
 Y             
 
               
 
       
 
 URINE     36897      [facility]            
 
 PREGNANCY  8                     [facility]           
 
  TEST                     INC        INC       
 
 VISUAL                            
 
 COLOR              
 
 CMPRSN      
 
 METHS         
 
               
 
           
 
 INITIAL     92728      [facility]   [facility]            
 
 INPATIENT  8                     JR,   JR,           
 
  CONSULT                     JEAN-PAUL F  JEAN-PAUL WINSLOW 
 
 NEW/ESTAB                           
 
  PT 80                          
 
 MIN           
 
               
 
         
 
 LEVEL III    16113      PATHOLOGY  PATHOLOGY           
 
  SURG   8                      &    &           
 
 PATHOLOGY                    CYTOLOGY   CYTOLOGY  
 
        LAB        LAB       
 
 [facility]                         
 
 ROSCOPIC              
 
 EXAM          
 
               
 
          
 
 LEVEL IV     89300      PATHOLOGY  PATHOLOGY           
 
 SURG   8                      &    &           
 
 PATHOLOGY                    CYTOLOGY   CYTOLOGY  
 
        LAB        LAB       
 
 [facility]                         
 
 ROSCOPIC              
 
 EXAM          
 
               
 
          
 
 CHOLECYST    94757      [facility] CASTREJON,            
 
 ECTOMY     8                     [facility]           
 
                                                   
 
                                 
 
 ANESTHESI    28685      [facility]  RAN CALHOUN                         ANESTH   YUSUF RAN             
 
 INTRAPERI                    OF THE             
 
 TONEAL       [place]         
 
 LOWER ABD               
 
  W/LAPS                
 
 NOS           
 
               
 
         
 
 HYSTEROSC    6812       [facility]            
 
 OPY        8                     [facility]           
 
                             INC        INC       
 
                               
 
             
 
 OTHER     6829       [facility]            
 
 EXCISION   8                     [facility]           
 
 OR                    INC        INC       
 
 DESTRUCTI                           
 
 ON LESION             
 
  UTERUS       
 
               
 
             
 
 OTHER     6909       [facility]            
 
 DILATION   8                     [facility]           
 
 AND                    INC        INC       
 
 CURETTAGE                           
 
  OF              
 
 UTERUS        
 
               
 
            
 
 CHOLECYST    5122       [facility]            
 
 ECTOMY     8                     [facility]           
 
                             INC        INC       
 
                                  
 
             
 
 BLOOD     47723      [facility]            
 
 COUNT   8                     [facility]           
 
 COMPLETE                     INC        INC       
 
 AUTO&AUTO                           
 
  DIFRNTL              
 
 WBC           
 
               
 
         
 
 URNLS DIP    16190      [facility]            
 
    8                     [facility]           
 
 STICK/TAB                    INC        INC       
 
 LET                            
 
 REAGENT              
 
 AUTO      
 
 MICROSCOP     
 
 Y             
 
               
 
       
 
 IADNA     28753      [facility]  [place],            
 
 NEISSERIA  8                      [place]    X[facility] E            
 
                                           
 
 GONORRHOE                       
 
 AE      
 
 AMPLIFIED     
 
  PROBE TQ     
 
               
 
               
 
 US     94751      [facility]   [facility]            
 
 TRANSVAGI  8                     [facility]           
 
 NAL                          INC        INC       
 
                                     
 
                 
 
 IADNA     48968      [facility]  [place],            
 
 CHLAMYDIA  8                      [place]    XIAO E            
 
                                           
 
 TRACHOMAT                       
 
 IS      
 
 AMPLIFIED     
 
  PROBE TQ     
 
               
 
               
 
 CYTP     78922      [facility]  [place],            
 
 CERV/VAG   8                      [place] [facility]    [facility] E            
 
 AUTO THIN                                         
 
  LAYER                        
 
 PREP MNL      
 
 SCREEN        
 
               
 
            
 
                                                                                
                                                                                
                                                                                
                                                                                
                                                                                
                                                                                
                                                                                
                                                                                
                                                                                
                                                                                
                                                                                
                                                                                
                                                                                
                                                                                
                                                                                
                                                                                
                                                                                
                                                                                
                                                                                
                                       
 
Encounters
                      
 
 
 Encounter  Start   End Date   Code       Location   Performer
 
  Type      Date                                                 
 
                                                        
 
                
 
 OFFICE     50768      [facility]   [facility]   
 
 OUTPATIEN  7          7                     PHYSICIAN           
 
 T VISIT                                S GROUP        
 
 10                    
 
 MINUTES               
 
               
 
             
 
 HOSPITAL                [facility]            
 
 -   7          7                     [facility]            
 
 OUTPATIEN                                   INC                 
 
 T                                             
 
                   
 
       
 
 OFFICE     01178      [facility]   [facility]   
 
 OUTPATIEN  7          7                     PHYSICIAN           
 
 T NEW 30                                S GROUP        
 
 MINUTES                     
 
                       
 
             
 
 HOSPITAL                [facility]            
 
 -   7          7                     [facility]            
 
 OUTPATIEN                                   INC                 
 
 T                                             
 
                   
 
       
 
 OFFICE     33724      [facility]   [place]   
 
 OUTPATIEN  7          7                     PHYSICIAN           
 
 T VISIT                                S GROUP        
 
 25                    
 
 MINUTES               
 
               
 
             
 
 OFFICE     96652      [facility]  7          7                     HEALTH            
 
 T VISIT                                SOLUTIONS      
 
 15           IN       
 
 MINUTES                 
 
                   
 
             
 
 OFFICE     88488      [facility]  7          7                     HEALTH            
 
 T VISIT                                SOLUTIONS      
 
 15           IN       
 
 MINUTES                 
 
                   
 
             
 
 OFFICE     88462      [facility]  7          7                     HEALTH            
 
 T VISIT                                SOLUTIONS      
 
 15           IN       
 
 MINUTES                 
 
                   
 
             
 
 EMERGENCY    58824      [facility]
 
    7          7                     [facility]                               EMERGENCY         
 
 T VISIT           SERV    
 
 MODERATE                
 
 SEVERITY             
 
               
 
              
 
 [facility]            
 
 -   7          7                     [facility]           
 
 T                                             
 
                         
 
       
 
 OFF[facility]     26026      [facility]                     [facility]            
 
 T VISIT                                SOLUTIONS      
 
 15           IN       
 
 MINUTES                 
 
                   
 
             
 
 OFFICE     63743      [facility] 
 
 OUTPATIEN  6          6                     [facility]      
 
 T VISIT                                URGENT            
 
 25          TREAT       
 
 MINUTES                 
 
                     
 
             
 
 OFFICE     54722      [facility]   [facility] 
 
 [facility]  6          [facility]                     [facility]      
 
 T VISIT                                URGENT            
 
 25          TREAT       
 
 MINUTES                 
 
                     
 
             
 
 PERIODIC     51244      [facility] 
 
 PREVENTIV  6          6                     [facility]  [facility]      
 
 E MED EST                                                   
 
  PATIENT                      
 
 18-39 YRS     
 
               
 
               
 
 OFFICE     90784      [facility]   [place] 
 
 OUTPATIEN  5          5                     [facility]  [place]      
 
 T VISIT                                S GROUP             
 
 15                      
 
 MINUTES               
 
               
 
             
 
 OFFICE   10-  10-  23955      [facility]   [place] 
 
 OUTPATIEN  5          5                     [facility]  [place]      
 
 T VISIT                                S GROUP             
 
 15                      
 
 MINUTES               
 
               
 
             
 
 OFFICE     83854      [facility]   [place] 
 
 OUTPATIEN  5          5                     [facility]  [place]      
 
 T VISIT                                S GROUP             
 
 10                      
 
 MINUTES               
 
               
 
             
 
 HOSPITAL   04-  04-             [facility]            
 
 -   5          5                     [facility]            
 
 OUTPATIEN                                   INC                 
 
                                              
 
                   
 
       
 
 HOSPITAL   03-  03-             [facility]            
 
 -   5          5                     [facility]            
 
 INPATIENT                                   INC                 
 
                                               
 
                   
 
 OFFICE     46521      [facility] 
 
 OUTPATIEN  5          5                     [facility]      
 
 T VISIT                                                    
 
 25                      
 
 MINUTES       
 
               
 
             
 
 HOSPITAL                [facility]            
 
 -   5          5                     [facility]            
 
 OUTPATIEN                                   INC                 
 
                                              
 
                   
 
       
 
 HOSPITAL                [facility]            
 
 -   5          5                     [facility]            
 
 OUTPATIEN                                   INC                 
 
 T                                             
 
                   
 
       
 
 OFFICE     63809      [facility] 
 
 OUTPATIEN  5          5                     [facility]      
 
 T VISIT                                                    
 
 25                      
 
 MINUTES       
 
               
 
             
 
 HOSPITAL                [facility]            
 
 -   5          5                     [facility]            
 
 OUTPATIEN                                   INC                 
 
 T                                             
 
                   
 
       
 
 OFFICE     43484      [facility] 
 
 OUTPATIEN  5          5                     [facility]      
 
 T VISIT                                                    
 
 15                      
 
 MINUTES       
 
               
 
             
 
 HOSPITAL   02-  02-             [facility]            
 
 -   5          5                     [facility]            
 
 OUTPATIEN                                   INC                 
 
 T                                             
 
                   
 
       
 
 HOSPITAL                [facility]            
 
 -   5          5                     [facility]            
 
 OUTPATIEN                                   INC                 
 
 T                                             
 
                   
 
       
 
 OFFICE     06163      [facility] 
 
 OUTPATIEN  5          5                     [facility]      
 
 T VISIT                                                    
 
 15                      
 
 MINUTES       
 
               
 
             
 
 OFFICE     07347      [facility] 
 
 OUTPATIEN  5          5                     PH[facility]ICIAN  [place]      
 
 T VISIT                                S GROUP             
 
 10                      
 
 MINUTES               
 
               
 
             
 
 HOSPITAL                [facility]            
 
 -   5          5                     [facility]            
 
 OUTPATIEN                                   INC                 
 
 T                                             
 
                   
 
       
 
 OFFICE     75945      [facility] 
 
 OUTPATIEN  5          5                     [facility]      
 
 T VISIT                                                    
 
 15                      
 
 MINUTES       
 
               
 
             
 
 HOSPITAL   01-  01-             [facility]            
 
 -   5          5                     [facility]            
 
 OUTPATIEN                                   INC                 
 
 T                                             
 
                   
 
       
 
 INITIAL     89164      [facility] 
 
 PREVENTIV  5          5                     [facility]                                                    
 
 MEDICINE                      
 
 NEW PT      
 
 AGE      
 
 18-37YRS      
 
               
 
              
 
 [facility]                [facility]            
 
 -   4          4                     [facility]            
 
 OUTPATIEN                                   [facility]                [facility]            
 
 -   4          4                     [facility]            
 
 OUTPATIEN                                   INC                 
 
 T                                             
 
                   
 
       
 
 OFFICE     48561      [facility]   [place] 
 
 OUTPATIEN  4          4                     PH[facility]ICIAN  [place]      
 
 T VISIT                                S GROUP             
 
 10                      
 
 MINUTES               
 
               
 
             
 
 EMERGENCY    31011      [facility] 
 
  DEPT   4          4                     [facility]   [place]      
 
 VISIT                                EMERGENCY           
 
 HIGH           PHYSI      
 
 SEVERITY&               
 
 THREAT             
 
 [facility]                [facility]            
 
 -   4          4                     [facility]            
 
 [facility]   10-  10-             [facility]            
 
 -   4          4                     [facility]            
 
 OUTPATIhospitals   10-  10-             [facility]            
 
 -   4          4                     [facility]            
 
 OUTPATIhospitals   10-  10-             [facility]            
 
 -   4          4                     [facility]            
 
 [facility]   10-  10-             [facility]            
 
 -   4          4                     [facility]            
 
 [facility]   10-  10-             [facility]            
 
 -   4          4                     [facility]            
 
 [facility]   10-  10-             [facility]            
 
 -   4          4                     [facility]            
 
 OUT[facility]   10-  10-             [facility]            
 
 -   4          4                     [facility]            
 
 [facility]   10-  10-             [facility]            
 
 -   4          4                     [facility]            
 
 [facility]                                   [facility]                 
 
 T                                             
 
                   
 
       
 
 OFFICE   10-  10-  18326      [facility] 
 
 OUTPATI[facility]  4          4                     [facility]  [place]      
 
 T VISIT                                S GROUP             
 
 10                      
 
 MINUTES               
 
               
 
             
 
 EMERGENCY  10-  10-  31224      [facility] 
 
  DEPT   4          4                     [facility]   [place]      
 
 VISIT                                EMERGENCY           
 
 HIGH           PHYSI      
 
 SEVERITY&               
 
 THREAT             
 
 [facility]   10-  10-             [facility]            
 
 -   4          4                     [facility]            
 
 OUTPATIEN                                   INC                 
 
 T                                             
 
                   
 
       
 
 OFFICE     71707      [facility]   [place] 
 
 OUTPATIEN  4          4                     PH[facility]ICIAN  [place]      
 
 T VISIT                                S GROUP             
 
 15                      
 
 MINUTES               
 
               
 
             
 
 OFFICE     29099      [facility] 
 
 OUTPATI[facility]  4          4                     [facility]  [place]      
 
 T VISIT                                S GROUP             
 
 10                      
 
 MINUTES               
 
               
 
             
 
 HOSPITAL                [facility]            
 
 -   4          4                     [facility]            
 
 OUTPATIEN                                   INC                 
 
 T                                             
 
                   
 
       
 
 EMERGENCY    36840      [facility] 
 
    4          4                     [facility]   PAT      
 
 DEPARTMEN                               EMERGENCY           
 
 T VISIT           PHYSI      
 
 MODERATE                
 
 SEVERITY             
 
               
 
              
 
 OFFICE     39146      [facility]   [facility], 
 
 OUTPATIEN  0          0                     [facility]
 
 T VISIT                                                    
 
 15                            
 
 MINUTES       
 
               
 
             
 
 OFFICE     51616      [facility] 
 
 OUTPATIEN  0          0                     [facility]EX [facility]   
 
 T NEW 30                                                    
 
 MINUTES                       
 
               
 
             
 
 EMERGENCY  04-  04-  83897      [facility][1], 
 
    0          0                     [facility]
 
 DEPARTMEN                               EMERGENCY           
 
 T VISIT           PHYS INC         
 
 HIGH/URGE               
 
 NT                
 
 SEVERITY      
 
               
 
              
 
 OFFICE     35571      [facility] 
 
 OUTPATIEN  0          0                     [facility]
 
 T VISIT                                                    
 
 15                            
 
 MINUTES       
 
               
 
             
 
 EMERGENCY    29501      [facility]  [place], 
 
    0          0                     [facility]   [place] 
 
 DEPARTMEN                               EMERGENCY  A        
 
 T VISIT           PHYS INC          
 
 HIGH/URGE               
 
 NT                
 
 SEVERITY      
 
               
 
              
 
 HOSPITAL                [place]            
 
 -   0          0                     [facility]           
 
 T                                             
 
                         
 
       
 
 HOSPITAL                [place]            
 
 -   9          9                     [facility]           
 
 T                                             
 
                         
 
       
 
 EMERGENCY    72214      [facility], 
 
    8          8                     [facility]
 
 DEPARTMEN                               EMERGENCY           
 
 T VISIT           PHYS INC         
 
 MODERATE                
 
 SEVERITY                
 
               
 
              
 
 OFFICE     80889      [facility]  8          8                     [facility] 
 
 T VISIT                                                    
 
 15                           
 
 MINUTES       
 
               
 
             
 
 HOSPITAL                [facility]            
 
 -   8          8                     [facility]            
 
 OUTPATIEN                                   INC                 
 
 T                                             
 
                   
 
       
 
 EMERGENCY    68577      [facility]            
 
    8          8                     [facility]            
 
 DEPARTMEN                               INC                 
 
 T VISIT                            
 
 HIGH/URGE         
 
 NT      
 
 SEVERITY      
 
               
 
              
 
 HOSPITAL                [place]            
 
 -   8          8                     [facility]            
 
 T                                             
 
                        
 
       
 
 OFFICE     87613      [facility] OUTPATIEN  8          8                     [facility]   
 
 T VISIT                                                    
 
 15                      
 
 MINUTES       
 
               
 
             
 
 HOSPITAL                [facility]            
 
 -   8          8                     [facility]            
 
 OUTPATIEN                                   INC                 
 
 T                                             
 
                   
 
       
 
 EMERGENCY    41555      [facility]            
 
    8          8                     [facility]            
 
 DEPARTMEN                               INC                 
 
 T VISIT                            
 
 MODERATE          
 
 SEVERITY      
 
               
 
              
 
 HOSPITAL                [facility]            
 
 -   8          8                     [facility]            
 
 INPATIENT                                   INC                 
 
                                               
 
                   
 
 HOSPITAL                [facility]            
 
 -   8          8                     [facility]            
 
 OUTPATIEN                                   INC                 
 
 T                                             
 
                   
 
       
 
 OFFICE     91238      [facility] K[facility]  8          8                     [facility] 
 
 T VISIT                                                    
 
 15                           
 
 MINUTES       
 
               
 
             
 
 HOSPITAL                [facility]            
 
 -   8          8                     [facility]            
 
 [facility]                 
 
 T

## 2017-10-11 NOTE — EXTERNAL MEDICAL SUMMARY RPT
Patient Summary
 Created on: 10/06/2017
 
ANTONI NAIR
External Reference #: [id number]
: 76
Sex: Female
 
Demographics
 
 
 
 Address  [address] [place] [address]
 
 Home Phone  [phone]
 
 Preferred Language  English
 
 Marital Status  Unknown
 
 Latter-day Affiliation  Unknown
 
 Race  White
 
 Ethnic Group  Unknown
 
 
Author
 
 
 
 Author            ,            ALFREDO FUENTES
 
 Address  Unknown
 
 Phone  [email or web address]
 
 
 
Care Team Providers
 
 
 
 Care Team Member Name  Role  Phone
 
 CITLALLI MOSQUERA,   Unavailable  Unavailable
 
 CITLALLI MOSQUERA JR, CHARLES F,  Unavailable  Unavailable
 
  JEAN-PAUL MUKHERJEE JR  
 
    
 
 [facility]   Unavailable  Unavailable
 
 [facility]   
 
 [facility]  
 
    
 
 [facility]   Unavailable  Unavailable
 
 [facility]   
 
 [facility],   Unavailable  Unavailable
 
 [facility]   
 
 [facility]   Unavailable  Unavailable
 
 [facility], [facility]   
 
 [facility]   
 
 [facility],   Unavailable  Unavailable
 
 [facility]   
 
 [facility],   Unavailable  Unavailable
 
 [facility]   
 
 [facility]   Unavailable  Unavailable
 
 [facility]   
 
 [facility]   Unavailable  Unavailable
 
 [facility] [place]   
 
 JULIANNE PEÑALOZA,   Unavailable  Unavailable
 
 JULIANNE PEÑALOZA   
 
 ROBERT GONZALO, ROBERT GONZALO   Unavailable  Unavailable
 
 KANE JR, KANE JR   Unavailable  Unavailable
 
 CLEMENTE, CLEMENTE   Unavailable  Unavailable
 
 CLEMENTE LIDA, CLEMENTE   Unavailable  Unavailable
 
 LIDA   
 
 KARLOS CASTREJON MD,   Unavailable  Unavailable
 
 GLENYS DWYER MD LIDA   Unavailable  Unavailable
 
 HARPEL LUCIANO, HARPEL   Unavailable  Unavailable
 
 KARLOS LAWSON R,   Unavailable  Unavailable
 
 HARPEL, KARLOS R   
 
 [facility]   Unavailable  Unavailable
 
 [facility]   
 
 [facility]   
 
 HARUZAIR WILLS GABRIELLA,   Unavailable  Unavailable
 
 HARSTON FRED MARSHALL,   Unavailable  Unavailable
 
 FRED ROY   
 
 [facility],  Unavailable  Unavailable
 
  [facility]  
 
    
 
 BRADLEY HUERTA   Unavailable  Unavailable
 
 BRADLEY LEGGETT, BRADLEY   Unavailable  Unavailable
 
 NAN   
 
 [facility]   Unavailable  Unavailable
 
 [facility]   
 
 [facility]   
 
 [facility],   Unavailable  Unavailable
 
 [facility]   
 
 REHMAN TANIA, REHMAN   Unavailable  Unavailable
 
 TANIA   
 
 SHAHLA RKUEGER,   Unavailable  Unavailable
 
 SHAHLA KRUEGER EMMETT P,   Unavailable  Unavailable
 
 SMILEY BRENNAN   
 
 [facility],  Unavailable  Unavailable
 
  [facility]   Unavailable  Unavailable
 
 URGENT TREAT,   
 
 [facility]   
 
 URG[facility]   
 
 [facility]   Unavailable  Unavailable
 
 [facility]   
 
 [facility]   Unavailable  Unavailable
 
 LAB, PATHOLOGY &   
 
 [facility] LAB   
 
 PAVEZ, PAVEZ   Unavailable  Unavailable
 
 PETTEY, PETTEY   Unavailable  Unavailable
 
 MAHOGANY TOD, MAHOGANY TOD   Unavailable  Unavailable
 
 [id number],  Unavailable  Unavailable
 
  [id number]  
 
    
 
 [id number],  Unavailable  Unavailable
 
  [id number]  
 
    
 
 [facility]   Unavailable  Unavailable
 
 [address] [id number], [facility]   
 
 [facility] [id number] #   
 
 [id number]   
 
 ARTIE GLORY, ARTIE   Unavailable  Unavailable
 
 GLORY   
 
 SCALF TANIA, SCALF TANIA   Unavailable  Unavailable
 
 [facility]   Un[facility]  Unavailable
 
 [facility],   
 
 [facility]   
 
 [facility]   
 
 [facility]   Unavailable  Unavailable
 
 [facility],   
 
 [facility]   
 
 [facility]   
 
 NILDA SHE,   Unavailable  Unavailable
 
 NILDA SHE   
 
 PHOENIX, XIAO E,   Unavailable  Unavailable
 
 [place], XIAO E   
 
 [facility]   Unavailable  Unavailable
 
 [facility],   
 
 [facility]   
 
 [facility]   
 
 SWINEY PAT, SWINEY   Unavailable  Unavailable
 
 PAT   
 
 YUSUF CALHOUN,   Unavailable  Unavailable
 
 YUSUF CALHOUN   
 
 [facility]   Unavailable  Unavailable
 
 [address], [facility]   
 
 [facility]ACY [address]   
 
 [facility]   Unavailable  Unavailable
 
 [address], [facility]   
 
 [facility] [address]   
 
 [facility] #   Unavailable  Unavailable
 
 [id number], [facility]   
 
 [facility] # [id number]   
 
 FRED FAJARDO,   Unavailable  Unavailable
 
 FRED FAJARDO RYAN B, TANA,   Unavailable  Unavailable
 
 ALEX NATHAN   
 
                                            
 
Purpose
                      Continuity of Care Document - 2008 through 10-06-
2017                                                                            
                     
 
Problems
                      
 
 
 Code         Diagnosis    DOS          Provider     Status     
 
                                                               
 
               
 
         LATERAL   2017   [facility]              
 
              EPICONDYLIT               PHYSICIANS              
 
      IS RIGHT           GROUP                   
 
  ELBOW                       
 
                  
 
      
 
         CARPAL   2017   [facility]              
 
              TUNNEL                PHYSICIANS              
 
      SYNDROME           GROUP                   
 
  RIGHT UPPER                 
 
   LIMB           
 
                
 
      
 
         LESION OF   2017   [facility]              
 
              ULNAR NERVE               PHYSICIANS              
 
       RIGHT           GROUP                   
 
  UPPER LIMB                  
 
                  
 
           
 
 [id number]       PAIN IN   2017   [facility]              
 
              RIGHT ELBOW               PHYSICIANS              
 
                           GROUP                   
 
                          
 
      
 
         MEDIAL   2017   [facility]              
 
              EPICONDYLIT               PHYSICIANS              
 
      IS RIGHT           GROUP                   
 
  ELBOW                       
 
                  
 
      
 
 [id number]         LOW BACK   2017   [facility]              
 
              PAIN                      PHYSICIANS              
 
                           GROUP                   
 
                  
 
      
 
         OTHER   2017   [facility]              
 
              FATIGUE                   PHYSICIANS              
 
                           GROUP                   
 
                      
 
      
 
         SCIATICA   2017   [facility]              
 
              LEFT SIDE                 [facility]              
 
                           SOLUTIONS              
 
          IN            
 
              
 
 [id number]       CELLULITIS   2017   [facility]              
 
              OF RIGHT                HEALTH              
 
      LOWER LIMB           SOLUTIONS              
 
                IN            
 
                     
 
         CUTANEOUS   2017   [facility]              
 
              ABSCESS OF                HEALTH              
 
      FACE                 SOLUTIONS              
 
                IN            
 
              
 
         UNSPECIFIED  2017   [facility]             
 
               OTITIS                N EMERGENCY             
 
      EXTERNA            SERVI                  
 
  LEFT EAR                    
 
                   
 
         
 
 I10          ESSENTIAL   2017   [facility]             
 
              PRIMARY                N EMERGENCY             
 
      HYPERTENSIO           SERVI                  
 
  N                           
 
                
 
 [id number]         OTHER   2016   [facility]              
 
      AND           URGENT              
 
  IDIOPATHIC    TREAT         
 
  NEUROPATHIE               
 
  S               
 
             
 
 M542         CERVICALGIA  2016   [facility]              
 
                       URGENT              
 
    TREAT         
 
                
 
      
 
         ACUTE   2016   [facility]              
 
              SINUSITIS                [facility]              
 
      UNSPECIFIED          URGENT              
 
                TREAT         
 
                        
 
      
 
 J028         ACUTE   2016   [facility]              
 
              PHARYNGITIS               [facility]              
 
       DUE TO           URGENT              
 
  OTHER SPEC    TREAT         
 
  ORGANISMS                 
 
                  
 
          
 
 N951         MENOPAUSAL   2016   [facility]              
 
              AND FEMALE                [facility]               
 
      CLIMACTERIC                                  
 
   STATES               
 
                
 
        
 
 [id number]       ENCOUNTER   2016   [facility]              
 
              GYN EXAM                [facility]               
 
      GENERAL RTN                                  
 
   W/O            
 
  ABNORMAL    
 
  FIND          
 
                
 
 [id number]       PAIN IN   2015   [facility]              
 
              RIGHT KNEE                PHYSICIANS              
 
                           GROUP                   
 
                         
 
      
 
         INTERVERTEB  2015   [facility]              
 
              RAL DISC                PHYSICIANS              
 
      D/O           GROUP                   
 
  W/RADICULOP                 
 
  ATHY LUMB      
 
  RGN           
 
               
 
 Z720         TOBACCO USE  2015   [facility]              
 
                                        PHYSICIANS              
 
                       GROUP                   
 
                  
 
      
 
 79612        ASTHMA,   2015   [facility]              
 
              UNSPECIFIED               PHYSICIANS              
 
      ,           GROUP                   
 
  UNSPECIFIED                 
 
   STATUS         
 
                
 
        
 
 7242         LUMBAGO      2015   [facility]              
 
                                        PHYSICIANS              
 
                   GROUP                   
 
                  
 
      
 
 V571         OTHER   04-   [facility]              
 
              PHYSICAL                [facility]              
 
      THERAPY              INC                     
 
                             
 
        
 
 220          BENIGN   2015   [facility]              
 
              NEOPLASM OF               PHYSICIANS              
 
       OVARY               GROUP                   
 
                              
 
         
 
 6146         PELVIC   2015   [facility]              
 
              PERITONEAL                LLC                     
 
      ADHESIONS,                                  
 
  FEMALE          
 
                
 
       
 
 6259         UNSPEC   2015   [facility]              
 
              SYMPTOM                PHYSICIANS              
 
      ASSOC           GROUP                   
 
  W/FEMALE                  
 
  GENITAL      
 
  ORGANS        
 
                
 
       
 
 73220        ABDOMINAL   2015   [facility]              
 
              PAIN,                ANESTH OF              
 
      UNSPECIFIED          THE BLUE                
 
   SITE                       
 
                     
 
      
 
 81025        UNSPECIFIED  2015   [facility]              
 
               VAGINITIS                [facility]               
 
      AND                                   
 
  VULVOVAGINI           
 
  TIS           
 
               
 
 6258         OT SPEC   2015   [facility]              
 
              SYMPTOM                [facility]              
 
      ASSOC           INC                     
 
  W/FEMALE                 
 
  GENITAL    
 
  ORGANS        
 
                
 
       
 
         PRE-PROCEDU  2015   [facility]              
 
              RAL                [facility]              
 
      LABORATORY           INC                     
 
  EXAMINATION                
 
                
 
            
 
 0794         HUMAN   02-   [facility]              
 
              PAPILLOMA                LLC                     
 
      VIRUS IN                                  
 
  CCE & UNS      
 
  SITE          
 
                
 
 75042        MILD   02-   [facility]              
 
              DYSPLASIA                LLC                     
 
      OF CERVIX                                   
 
                  
 
          
 
 6268         OTH D/O   02-   [facility]              
 
              MENSTRUATIO               [facility]              
 
      N&OTH ABN           INC                     
 
  BLEED FE                 
 
  GNT TRACT     
 
                
 
          
 
 6269         UNS D/O   02-   [facility]              
 
              MENSTRUATIO               ANESTH OF              
 
      N&OTH ABN           THE BLUE                
 
  BLEED FE                  
 
  GNT TRACT          
 
                
 
          
 
 6262         EXCESSIVE   2015   [facility]              
 
              OR FREQUENT               [facility]              
 
                 INC                     
 
  MENSTRUATIO                
 
  N             
 
             
 
         OTHER   2015   [facility]              
 
              SPECIFIED                [facility]              
 
      PRE-OPERATI          INC                     
 
  VE                 
 
  EXAMINATION   
 
                
 
            
 
 7862         COUGH        2015   [facility]              
 
                                        RADIOLOGY               
 
                                         
 
            
 
 6202         OTHER AND   2015   [facility]              
 
              UNSPECIFIED               [facility]              
 
       OVARIAN           INC                     
 
  CYST                       
 
                
 
         ROUTINE   2015   [facility]               
 
      AL                                   
 
  EXAMINATION           
 
                
 
            
 
 53879        CHEST PAIN   2014   [facility]              
 
              UNSPECIFIED               [facility]              
 
                           INC                     
 
                         
 
 6823         CELLULITIS   2014   [facility]              
 
              AND ABSCESS               PHYSICIANS              
 
       OF UPPER           GROUP                   
 
  ARM AND                  
 
  FOREARM         
 
                
 
        
 
 6826         CELLULITIS   2014   [facility]             
 
              AND ABSCESS               N EMERGENCY             
 
       OF LEG            PHYSI                  
 
  EXCEPT FOOT                 
 
                   
 
            
 
 7802         SYNCOPE AND  2014   [facility]             
 
               COLLAPSE                 N EMERGENCY             
 
                            PHYSI                  
 
                        
 
       
 
 6829         CELLULITIS   10-   [facility]              
 
              AND ABSCESS               PHYSICIANS              
 
       OF           GROUP                   
 
  UNSPECIFIED                 
 
   SITE           
 
                
 
      
 
 3829         UNSPECIFIED  2014   [facility]              
 
               OTITIS                PHYSICIANS              
 
      MEDIA                GROUP                   
 
                              
 
        
 
 44173        OTHER   2014   [facility]              
 
              MALAISE AND               [facility]              
 
       FATIGUE             INC                     
 
                             
 
         
 
 17770        CONTACT   2014   [facility]             
 
              DERMATITIS&               N EMERGENCY             
 
      OTHER            PHYSI                  
 
  ECZEMA DUE                  
 
  TO SUN[place]URN       
 
                
 
           
 
 3540         CARPAL   2010   [facility]              
 
              TUNNEL                [facility]               
 
    SYNDROME                                     
 
                        
 
         
 
 40736        OTHER   2010   [facility]              
 
              TENOSYNOVIT               [facility] L[facility]               
 
    IS OF HAND                                   
 
  AND WRIST             
 
                
 
          
 
 4660         ACUTE   2010   [facility]              
 
              BRONCHITIS                B                       
 
                                              
 
             
 
 7821         RASH AND   2010   [facility]              
 
              OTHER                B                       
 
    NONSPECIFIC                               
 
   SKIN      
 
  ERUPTION      
 
                
 
         
 
 7241         PAIN IN   04-   [facility]             
 
              THORACIC                N EMERGENCY             
 
    SPINE                 PHYS INC               
 
                              
 
            
 
 26591        ACUTE   2010   [facility]              
 
              DERMATITIS                FRED M               
 
    DUE TO                                   
 
  SOLAR            
 
  RADIATION     
 
                
 
          
 
 6926         CONTACT   2010   [facility]             
 
              DERMATITIS&               N EMERGENCY             
 
    OTHER            PHYS INC               
 
  ECZEMA DUE                  
 
  TO PLANTS           
 
                
 
          
 
 3670         HYPERMETROP  2010   [facility] W               
 
                                               
 
            
 
 2724         OTHER AND   2010   [place]              
 
              UNSPECIFIED               [facility]              
 
               HOSPITAL                
 
  HYPERLIPIDE                 
 
  AJ                
 
               
 
 2859         UNSPECIFIED  2009   [place]              
 
               ANEMIA                   [facility]                
 
                      
 
         
 
 4739         UNSPECIFIED  2008   [facility]             
 
               SINUSITIS                N EMERGENCY             
 
                          PHYS INC               
 
                         
 
          
 
 5990         URINARY   2008   [facility]              
 
              TRACT                [facility]              
 
    INFECTION           INC                     
 
  SITE NOT                 
 
  SPECIFIED     
 
                
 
          
 
 23417        UNSPECIFIED  2008   [facility]                                        
 
                     
 
      
 
 43965        SWELLING OF  2008   [facility]              
 
               LIMB                     RADIOLOGY              
 
                         ASSOCIATES              
 
      PSC           
 
               
 
 5602         HEMORRHAGE   2008   [facility]              
 
              OF RECTUM                [facility]              
 
    AND ANUS             INC                     
 
                             
 
         
 
 27594        CALCU   2008   [facility]OLOGY &             
 
              [place]                 CYTOLOGY              
 
    W/OTH           LAB                     
 
  CHOLECYST                 
 
  W/O MENTION   
 
   OBST         
 
                
 
      
 
 90678        CALCU   2008   [facility]              
 
              [place]                JEAN-PAUL F               
 
    W/O MENTION                                  
 
              
 
  CHOLECYST/O   
 
  BST           
 
               
 
 09450        ACUTE AND   2008   [facility]               
 
    CHOLECYSTIT                                  
 
  IS                    
 
              
 
 5753         HYDROPS OF   2008   [facility]              
 
              [place]               [facility]              
 
                         INC                     
 
                         
 
 6141         CHRONIC   2008   [facility]              
 
              SALPINGITIS               [facility]              
 
     AND           INC                     
 
  OOPHORITIS                 
 
                
 
           
 
 6142         SALPINGITIS  2008   PATHOLOGY &             
 
              &OOPHORITIS                CYTOLOGY              
 
     NOT ACUT           LAB                     
 
  SUBACUT/CHR                
 
  N             
 
             
 
 6200         FOLLICULAR   2008   [facility] &             
 
              CYST OF                 CYTOLOGY              
 
    OVARY                LAB                     
 
                             
 
      
 
 6201         CORPUS   2008   PATHOLOGY &             
 
              LUTEUM CYST                CYTOLOGY              
 
     OR           LAB                     
 
  HEMATOMA                   
 
                
 
         
 
 6210         POLYP OF   2008   [facility]              
 
              CORPUS                [facility]              
 
    UTERI                INC                     
 
                             
 
      
 
 66065        HEMORRHAGE   2008   [facility]              
 
              COMPLICATIN               [facility]               
 
    G A                                   
 
  PROCEDURE            
 
  NEC           
 
               
 
         LAPAROSCOPI  2008   [facility] MUKHERJEE JR SURGICAL                [facility]               
 
    PROC                                   
 
  COVERTED            
 
  OPEN PROC     
 
                
 
          
 
         SPECIAL SCR  2008   [facility]              
 
                              [place] [facility]                  
 
    EXAMINATION                                  
 
   OTH SPEC         
 
  CHLAMYDIAL    
 
  DZ            
 
              
 
 V745         SCREENING   2008   [facility]              
 
              EXAMINATION               [facility]                  
 
     FOR                                   
 
  VENEREAL         
 
  DISEASE       
 
                
 
        
 
 V762         SCREENING   2008   [facility]              
 
              FOR                [place] [facility]                  
 
    MALIGNANT                                   
 
  NEOPLASM OF        
 
   THE CERVIX   
 
                
 
            
 
 V780         SCREENING   2008   [facility]              
 
              FOR IRON                [facility]               
 
    DEFICIENCY                                   
 
  ANEMIA                
 
                
 
       
 
                                                                                
                                                                                
                                                                                
                                                                                
                                                                                
                                                                                
                                                                                
                                                                                
                                                                                
                                                                                
                                                          
 
Allergies, Adverse Reactions, Alerts
                      Clinical Alert Notifications            
 
 
 Alert                
 
 Asthma: absence of controller with h/o SA beta agonist                
 
 Asthma: no influenza vaccine in the last 365 days                
 
                                                                                
                           
 
Medications
                      
 
 
 Na  ND  Rx  Da  Fi  Fi  Am  Da  Di  Ph  RX  Ph  St
 
 me  C   No  te  ll  ll  ou  ys  ag  ar   #  ys  at
 
         rm        s   nt      no  ma      ic  us
 
             Or  Da              si  cy      ia    
 
             de  te              s           n     
 
             re                                    
 
             d                                     
 
                                                   
 
                                                   
 
                                                   
 
                                                  
 
                                   
 
                           
 
                      
 
               
 
              
 
 TI  60      09  09      30  30          00  CA  Ac
 
 ZA  50      -0  -2      .0              00  RL  ti
 
 NI  50      5-  9-      00              00  IS  ve
 
 DI  25      20  20                      77  LE    
 
 NE  20      17  17                      86       
 
    2                                   69  DR    
 
 HC                                          UG    
 
 L                                           S     
 
 4                                                 
 
 MG                                               
 
                                            
 
 TA                                       
 
 BL                                    
 
 ET                                    
 
                                
 
                           
 
                          
 
                        
 
               
 
               
 
               
 
               
 
               
 
 GE  24      08  09      5.  5           00  CA  Ac
 
 NT  20      -2  -2      00              00  RL  ti
 
 AM  80      9-  2-      0               00  IS  ve
 
 IC  58      20  20                      77  LE    
 
 IN  06      17  17                      99       
 
    0                                   88  DR    
 
 0.                                          UG    
 
 3%                                          S     
 
                                                  
 
 EY                                              
 
 E                                           
 
 DR                                      
 
 OP                                    
 
 S                                     
 
                                
 
                           
 
                          
 
                        
 
               
 
               
 
               
 
               
 
              
 
 TI  60      08  08      30  30          00  CA  Ac
 
 ZA  50      -0  -2      .0              00  RL  ti
 
 NI  50      2-  5-      00              00  IS  ve
 
 DI  25      20  20                      77  LE    
 
 NE  20      17  17                      86       
 
    2                                   69  DR    
 
 HC                                          UG    
 
 L                                           S     
 
 4                                                 
 
 MG                                               
 
                                            
 
 TA                                       
 
 BL                                    
 
 ET                                    
 
                                
 
                           
 
                          
 
                        
 
               
 
               
 
               
 
               
 
               
 
 TI  57      06  07      60  30          00  CA  Ac
 
 ZA  66      -2  -2      .0              00  RL  ti
 
 NI  40      9-  8-      00              00  IS  ve
 
 DI  50      20  20                      77  LE    
 
 NE  31      17  17                      48       
 
    8                                   46  DR    
 
 HC                                          UG    
 
 L                                           S     
 
 4                                                 
 
 MG                                               
 
                                            
 
 TA                                       
 
 BL                                    
 
 ET                                    
 
                                
 
                           
 
                          
 
                        
 
               
 
               
 
               
 
               
 
               
 
 TI  57      05  06      60  30          00  CA  Ac
 
 ZA  66      -2  -1      .0              00  RL  ti
 
 NI  40      4-  6-      00              00  IS  ve
 
 DI  50      20  20                      77  LE    
 
 NE  31      17  17                      48       
 
    8                                   46  DR    
 
 HC                                          UG    
 
 L                                           S     
 
 4                                                 
 
 MG                                               
 
                                            
 
 TA                                       
 
 BL                                    
 
 ET                                    
 
                                
 
                           
 
                          
 
                        
 
               
 
               
 
               
 
               
 
               
 
 VE  00      05  06      18  16          00  CA  Ac
 
 NT  17      -2  -1      .0              00  RL  ti
 
 OL  30      4-  6-      00              00  IS  ve
 
 IN  68      20  20                      77  LE    
 
    22      17  17                      48       
 
 HF  0                                   47  DR    
 
 A                                           UG    
 
 90                                          S     
 
                                                  
 
 MC                                               
 
 G                                           
 
 IN                                       
 
 HA                                    
 
 LE                                    
 
 R                              
 
                           
 
                          
 
                        
 
               
 
               
 
               
 
               
 
               
 
              
 
 AC  00      05  05      60  30          00  CA  Ac
 
 ET  09      -0  -2      .0              00  RL  ti
 
 AM  30      2-  6-      00              00  IS  ve
 
 IN  15      20  20                      77  LE    
 
 OP  00      17  17                      42       
 
 HE  1                                   47  DR    
 
 N-                                          UG    
 
 CO                                          S     
 
 D                                                 
 
 #3                                               
 
                                            
 
 TA                                       
 
 BL                                    
 
 ET                                    
 
                                
 
                           
 
                          
 
                        
 
               
 
               
 
               
 
               
 
               
 
 CT  00      04  05      55  10          00  SO  Ac
 
 ED  05      -2  -2      .0              00  PE  ti
 
 NI  44      7-  6-      00              00  RS  ve
 
 SO  72      20  20                      56       
 
 NE  83      17  17                      24  FA    
 
  5  1                                   57  MI    
 
                                            LY    
 
 MG                                               
 
                                            DR    
 
 TA                                         UG    
 
 BL                                          
 
 ET                                       
 
                                       
 
                                       
 
                                
 
                           
 
                           
 
                           
 
                  
 
               
 
               
 
 TI  57      04  05      60  30          00  SO  Ac
 
 ZA  66      -2  -1      .0              00  PE  ti
 
 NI  40      4-  9-      00              00  RS  ve
 
 DI  50      20  20                      55       
 
 NE  31      17  17                      37  FA    
 
    8                                   39  MI    
 
 HC                                          LY    
 
 L                                                
 
 4                                           DR    
 
 MG                                         UG    
 
                                            
 
 TA                                       
 
 BL                                    
 
 ET                                    
 
                                
 
                           
 
                           
 
                           
 
                  
 
               
 
               
 
               
 
               
 
 CE  69      04  05      30  30          00  SO  Ac
 
 LE  09      -1  -1      .0              00  PE  ti
 
 CO  70      9-  2-      00              00  RS  ve
 
 XI  42      20  20                      55       
 
 B   20      17  17                      56  FA    
 
 10  7                                   40  MI    
 
 0                                           LY    
 
 MG                                               
 
                                            DR    
 
 CA                                         UG    
 
 PS                                          
 
 UL                                       
 
 E                                     
 
                                       
 
                                
 
                           
 
                           
 
                           
 
                  
 
               
 
               
 
              
 
 VE  00      03  04      18  20          00  SO  Ac
 
 NT  17      -2  -1      .0              00  PE  ti
 
 OL  30      0-  4-      00              00  RS  ve
 
 IN  68      20  20                      55       
 
    22      17  17                      37  FA    
 
 HF  0                                   40  MI    
 
 A                                           LY    
 
 90                                               
 
                                            DR    
 
 MC                                         UG    
 
 G                                           
 
 IN                                       
 
 HA                                    
 
 LE                                    
 
 R                              
 
                           
 
                           
 
                           
 
                  
 
               
 
               
 
               
 
               
 
              
 
 TI  57      03  04      60  30          00  SO  Ac
 
 ZA  66      -2  -1      .0              00  PE  ti
 
 NI  40      2-  4-      00              00  RS  ve
 
 DI  50      20  20                      55       
 
 NE  31      17  17                      37  FA    
 
    8                                   39  MI    
 
 HC                                          LY    
 
 L                                                
 
 4                                           DR    
 
 MG                                         UG    
 
                                            
 
 TA                                       
 
 BL                                    
 
 ET                                    
 
                                
 
                           
 
                           
 
                           
 
                  
 
               
 
               
 
               
 
               
 
 CI  16      03  04      20  10          00  SO  Ac
 
 CT  57      -2  -1      .0              00  PE  ti
 
 OF  10      2-  4-      00              00  RS  ve
 
 LO  41      20  20                      55       
 
 XA  25      17  17                      95  FA    
 
 CI  0                                   47  MI    
 
 N                                           LY    
 
 HC                                               
 
 L                                           DR    
 
 50                                         UG    
 
 0                                           
 
 MG                                       
 
                                      
 
 TA                                    
 
 B                              
 
                           
 
                           
 
                           
 
                  
 
               
 
               
 
               
 
               
 
              
 
 CE  69      03  04      30  30          00  SO  Ac
 
 LE  09      -1  -0      .0              00  PE  ti
 
 CO  70      5-  7-      00              00  RS  ve
 
 XI  42      20  20                      55       
 
 B   20      17  17                      56  FA    
 
 10  7                                   40  MI    
 
 0                                           LY    
 
 MG                                               
 
                                            DR    
 
 CA                                         UG    
 
 PS                                          
 
 UL                                       
 
 E                                     
 
                                       
 
                                
 
                           
 
                           
 
                           
 
                  
 
               
 
               
 
              
 
 CL  63      03  03      40  10          00  SO  Ac
 
 IN  30      -0  -3      .0              00  PE  ti
 
 DA  40      8-  1-      00              00  RS  ve
 
 MY  69      20  20                      55       
 
 CI  20      17  17                      82  FA    
 
 N   1                                   70  MI    
 
 HC                                          LY    
 
 L                                                
 
 15                                          DR    
 
 0                                          UG    
 
 MG                                          
 
                                         
 
 CA                                    
 
 PS                                    
 
 UL                             
 
 E                         
 
                           
 
                           
 
                  
 
               
 
               
 
               
 
               
 
               
 
              
 
 TI  57      02  03      60  30          00  SO  Ac
 
 ZA  66      -2  -1      .0              00  PE  ti
 
 NI  40      0-  7-      00              00  RS  ve
 
 DI  50      20  20                      55       
 
 NE  31      17  17                      37  FA    
 
    8                                   39  MI    
 
 HC                                          LY    
 
 L                                                
 
 4                                           DR    
 
 MG                                         UG    
 
                                            
 
 TA                                       
 
 BL                                    
 
 ET                                    
 
                                
 
                           
 
                           
 
                           
 
                  
 
               
 
               
 
               
 
               
 
 PA  00      02  03      30  30          00  SO  Ac
 
 RO  37      -0  -0      .0              00  PE  ti
 
 XE  87      8-  3-      00              00  RS  ve
 
 TI  00      20  20                      55       
 
 NE  29      17  17                      56  FA    
 
    3                                   39  MI    
 
 HC                                          LY    
 
 L                                                
 
 20                                          DR    
 
                                           UG    
 
 MG                                          
 
                                         
 
 TA                                    
 
 BL                                    
 
 ET                             
 
                           
 
                           
 
                           
 
                  
 
               
 
               
 
               
 
               
 
               
 
 CE  69      02  03      30  30          00  SO  Ac
 
 LE  09      -0  -0      .0              00  PE  ti
 
 CO  70      8-  3-      00              00  RS  ve
 
 XI  42      20  20                      55       
 
 B   20      17  17                      56  FA    
 
 10  7                                   40  MI    
 
 0                                           LY    
 
 MG                                               
 
                                            DR    
 
 CA                                         UG    
 
 PS                                          
 
 UL                                       
 
 E                                     
 
                                       
 
                                
 
                           
 
                           
 
                           
 
                  
 
               
 
               
 
              
 
 HY  00      02  03      60  30          00  SO  Ac
 
 DR  18      -0  -0      .0              00  PE  ti
 
 OX  50      8-  3-      00              00  RS  ve
 
 YZ  67      20  20                      55       
 
 IN  40      17  17                      56  FA    
 
 E   5                                   41  MI    
 
 PA                                          LY    
 
 M                                                
 
 25                                          DR    
 
                                           UG    
 
 MG                                          
 
                                         
 
 CA                                    
 
 P                                     
 
                                
 
                           
 
                           
 
                           
 
                  
 
               
 
               
 
               
 
              
 
 ES  00      01  02      30  30          00  SO  Ac
 
 TR  55      -1  -1      .0              00  PE  ti
 
 AD  50      8-  0-      00              00  RS  ve
 
 IO  88      20  20                      55       
 
 L   60      17  17                      39  FA    
 
 1   2                                   06  MI    
 
 MG                                          LY    
 
                                                 
 
 TA                                          DR    
 
 BL                                         UG    
 
 ET                                          
 
                                          
 
                                       
 
                                       
 
                                
 
                           
 
                           
 
                           
 
                  
 
               
 
 TI  57      01  02      60  30          00  SO  Ac
 
 ZA  66      -1  -1      .0              00  PE  ti
 
 NI  40      6-  0-      00              00  RS  ve
 
 DI  50      20  20                      55       
 
 NE  31      17  17                      37  FA    
 
    8                                   39  MI    
 
 HC                                          LY    
 
 L                                                
 
 4                                           DR    
 
 MG                                         UG    
 
                                            
 
 TA                                       
 
 BL                                    
 
 ET                                    
 
                                
 
                           
 
                           
 
                           
 
                  
 
               
 
               
 
               
 
               
 
 VE  00      01  02      18  20          00  SO  Ac
 
 NT  17      -1  -1      .0              00  PE  ti
 
 OL  30      6-  0-      00              00  RS  ve
 
 IN  68      20  20                      55       
 
    22      17  17                      37  FA    
 
 HF  0                                   40  MI    
 
 A                                           LY    
 
 90                                               
 
                                            DR    
 
 MC                                         UG    
 
 G                                           
 
 IN                                       
 
 HA                                    
 
 LE                                    
 
 R                              
 
                           
 
                           
 
                           
 
                  
 
               
 
               
 
               
 
               
 
              
 
 DI  16      01  02      60  30          00  SO  Ac
 
 CL  57      -1  -1      .0              00  PE  ti
 
 OF  10      6-  0-      00              00  RS  ve
 
 EN  20      20  20                      55       
 
 AC  11      17  17                      37  FA    
 
    1                                   41  MI    
 
 SO                                          LY    
 
 D                                                
 
 EC                                          DR    
 
                                           UG    
 
 75                                          
 
                                         
 
 MG                                    
 
                                      
 
 TA                             
 
 B                         
 
                           
 
                           
 
                  
 
               
 
               
 
               
 
               
 
               
 
              
 
 AM  16      01  02      30  30          00  SO  Ac
 
 IT  72      -1  -1      .0              00  PE  ti
 
 RI  90      6-  0-      00              00  RS  ve
 
 PT  17      20  20                      55       
 
 YL  30      17  17                      37  FA    
 
 IN  1                                   42  MI    
 
 E                                           LY    
 
 HC                                               
 
 L                                           DR    
 
 50                                         UG    
 
                                            
 
 MG                                       
 
                                      
 
 TA                                    
 
 B                              
 
                           
 
                           
 
                           
 
                  
 
               
 
               
 
               
 
               
 
              
 
 ME  00      07  07  0   21  6       WA  70  WE  Ac
 
 TH  60      -2  -2      .0          L-  41  ST  ti
 
 YL  34      8-  8-      00          MA  64     ve
 
 CT  59      20  20                  RT  7   RI    
 
 ED  31      10  10                         CH    
 
 NI  5                               PH      AR    
 
 SO                                  AR      D     
 
 LO                                  MA      M     
 
 NE                                  CY            
 
  4                                #            
 
                                        
 
 MG                         10         
 
                        04        
 
 DO                      93      
 
 SE                       
 
 PK                     
 
                       
 
                     
 
                  
 
                  
 
                  
 
                  
 
                  
 
               
 
               
 
     00      07  07  0   20  6       CA  67  WE  Ac
 
     59      -0  -0      .0          RL  06  ST  ti
 
     10      9-  9-      00          IS  75     ve
 
     34      20  20                  LE      RI    
 
     90      10  10                         CH    
 
     5                               DR      AR    
 
                                     UG      D     
 
                                            M     
 
                                     CO            
 
                                  MP            
 
                            AN            
 
                            Y          
 
                                 
 
                                 
 
                          
 
                        
 
                       
 
                     
 
               
 
               
 
              
 
 ME  00      07  07  0   21  6       CA  67  WE  Ac
 
 TH  78      -0  -0      .0          RL  06  ST  ti
 
 YL  15      9-  9-      00          IS  76     ve
 
 CT  02      20  20                  LE      RI    
 
 ED  20      10  10                         CH    
 
 NI  7                               DR      AR    
 
 SO                                  UG      D     
 
 LO                                         M     
 
 NE                                  CO            
 
  4                               MP            
 
                           AN            
 
 MG                         Y          
 
                                
 
 DO                              
 
 SE                       
 
 PK                     
 
                       
 
                     
 
                  
 
                  
 
                 
 
               
 
               
 
               
 
               
 
 NA  00      07  07  1   60  30      CA  67  WE  Ac
 
 CT  09      -0  -0      .0          RL  06  ST  ti
 
 OX  30      9-  9-      00          IS  77     ve
 
 EN  14      20  20                  LE      RI    
 
    90      10  10                         CH    
 
 50  5                               DR      AR    
 
 0                                   UG      D     
 
 MG                                         M     
 
                                    CO            
 
 TA                                MP            
 
 BL                         AN            
 
 ET                         Y          
 
                                 
 
                                 
 
                          
 
                        
 
                       
 
                     
 
                  
 
                  
 
                 
 
 MU  00      04  04      22  10      RI  39  WE  Ac
 
 PI  09      -3  -3      .0          TE  97  ST  ti
 
 RO  31      0-  0-      00             98     ve
 
 CI  01      20  20                  AI      RY    
 
 N   04      10  10                  D       AN    
 
 2%  2                               PH       B    
 
                                    AR            
 
 OI                                  MA            
 
 NT                                  CY            
 
 ME                                              
 
 NT                            03            
 
                               91         
 
                            4       
 
                            #       
 
                     03      
 
                   91   
 
                        
 
                        
 
                  
 
                  
 
               
 
               
 
               
 
               
 
               
 
 AZ  59      04  04      6.  5       RI  39  WE  Ac
 
 IT  76      -3  -3      00          TE  97  ST  ti
 
 HR  23      0-  0-      0              97     ve
 
 OM  06      20  20                  AI      RY    
 
 YC  00      10  10                  D       AN    
 
 IN  1                               PH       B    
 
                                    AR            
 
 25                                  MA            
 
 0                                   CY            
 
 MG                                             
 
                              03            
 
 TA                           91         
 
 BL                         4       
 
 ET                         #       
 
                     03      
 
                   91   
 
                        
 
                        
 
                  
 
                  
 
                  
 
                  
 
                  
 
               
 
               
 
 FA  00      04  04      30  15      RI  39  AH  Ac
 
 MO  17      -0  -0      .0          TE  72  ME  ti
 
 TI  25      5-  6-      00             61  D   ve
 
 DI  72      20  20                  AI      MU    
 
 NE  86      10  10                  D       SWANN    
 
    0                               PH      MM    
 
 20                                  AR      AD    
 
                                    MA       A    
 
 MG                                  CY            
 
                                                
 
 TA                            03            
 
 BL                            91         
 
 ET                         4       
 
                            #       
 
                     03      
 
                   91      
 
                           
 
                        
 
                  
 
                  
 
                  
 
                  
 
               
 
               
 
               
 
 HY  68      04  04      24  6       RI  39  AH  Ac
 
 DR  46      -0  -0      .0          TE  72  ME  ti
 
 OX  20      5-  6-      00             59  D   ve
 
 YZ  36      20  20                  AI      MU    
 
 IN  10      10  10                  D       SWANN    
 
 E   1                               PH      MM    
 
 HC                                  AR      AD    
 
 L                                   MA       A    
 
 25                                  CY            
 
                                               
 
 MG                            03            
 
                              91         
 
 TA                         4       
 
 BL                         #       
 
 ET                  03      
 
                   91      
 
                           
 
                        
 
                  
 
                  
 
                  
 
                  
 
                  
 
                  
 
               
 
 CT  00      04  04      42  12      RI  39  AH  Ac
 
 ED  60      -0  -0      .0          TE  72  ME  ti
 
 NI  35      6-  6-      00             57  D   ve
 
 SO  33      20  20                  AI      MU    
 
 NE  82      10  10                  D       HA    
 
    1                               PH      MM    
 
 10                                  AR      AD    
 
                                    MA       A    
 
 MG                                  CY            
 
                                                
 
 TA                            03            
 
 BL                            91         
 
 ET                         4       
 
                            #       
 
                     03      
 
                   91      
 
                           
 
                        
 
                  
 
                  
 
                  
 
                  
 
               
 
               
 
               
 
 LO  00      04  04      10  10      RI  39  AH  Ac
 
 RA  78      -0  -0      .0          TE  72  ME  ti
 
 TA  15      5-  6-      00             56  D   ve
 
 DI  07      20  20                  AI      MU    
 
 NE  70      10  10                  D       HA    
 
    1                               PH      MM    
 
 10                                  AR      AD    
 
                                    MA       A    
 
 MG                                  CY            
 
                                                
 
 TA                            03            
 
 BL                            91         
 
 ET                         4       
 
                            #       
 
                     03      
 
                   91      
 
                           
 
                        
 
                  
 
                  
 
                  
 
                  
 
               
 
               
 
               
 
 PE  00      03  03      59  2       RI  39  WE  Ac
 
 RM  47      -1  -1      .0          TE  50  ST  ti
 
 ET  25      6-  6-      00             39     ve
 
 HR  24      20  20                  AI      RI    
 
 IN  26      10  10                  D       CH    
 
    7                               PH      AR    
 
 1%                                  AR      D     
 
                                    MA      M     
 
 LO                                  CY            
 
 TI                                             
 
 ON                            03            
 
                               91         
 
                            4       
 
                            #       
 
                     03      
 
                   91      
 
                          
 
                        
 
                  
 
                  
 
               
 
               
 
               
 
               
 
               
 
 OX  00      02  02  00  15  3       RI  82  RU  Ac
 
 YC  40      -1  -2      .0          TE  15  SH  ti
 
 OD  60      6-  6-      00             74     ve
 
 ON  52      20  20                  AI      NE    
 
 -A  20      10  10                  D       IL    
 
 CE  1                               PH       C    
 
 TA                                  AR            
 
 MI                                  M             
 
 NO                                  #3            
 
 PH                                93            
 
 EN                         8             
 
                                      
 
 7.                              
 
 5-                              
 
 32                       
 
 5                   
 
                     
 
                     
 
                  
 
                 
 
               
 
               
 
               
 
               
 
              
 
     00      01  01  00  18  3       RI  81  RU  Ac
 
     40      -1  -2      .0          TE  69  SH  ti
 
     60      3-  8-      00             58     ve
 
     35      20  20                  AI      NE    
 
     70      10  10                  D       IL    
 
     5                               PH       C    
 
                                     AR            
 
                                     M             
 
                                     #3            
 
                                   93            
 
                            8             
 
                                       
 
                                 
 
                                 
 
                          
 
                     
 
                     
 
                     
 
               
 
              
 
 PE  00      01  01  00  40  10      RI  38  RU  Ac
 
 NI  09      -1  -2      .0          TE  86  SH  ti
 
 CI  31      5-  8-      00             02     ve
 
 LL  17      20  20                  AI      NE    
 
 IN  20      10  10                  D       IL    
 
    1                               PH       C    
 
 VK                                  AR            
 
                                    M             
 
 25                                  #3            
 
 0                                  91            
 
 MG                         4             
 
                                      
 
 TA                              
 
 BL                              
 
 ET                       
 
                     
 
                     
 
                     
 
                  
 
                 
 
               
 
               
 
               
 
               
 
     00      01  01  00  12  3       RI  38  RU  Ac
 
     40      -1  -2      .0          TE  86  SH  ti
 
     60      6-  8-      00             05     ve
 
     35      20  20                  AI      NE    
 
     70      10  10                  D       IL    
 
     5                               PH       C    
 
                                     AR            
 
                                     M             
 
                                     #3            
 
                                   91            
 
                            4             
 
                                       
 
                                 
 
                                 
 
                          
 
                     
 
                     
 
                     
 
               
 
              
 
     00      12  12  00  10  3       RI  38  WE  Ac
 
     40      -0  -1      .0          TE  46  ST  ti
 
     60      8-  7-      00             10     ve
 
     35      20  20                  AI      RI    
 
     70      09  09                  D       CH    
 
     5                               PH      AR    
 
                                     AR      D     
 
                                     M       M     
 
                                     #3            
 
                                   91            
 
                            4             
 
                                       
 
                                 
 
                                 
 
                          
 
                        
 
                       
 
                     
 
               
 
              
 
 PE  00      12  12  00  59  2       RI  38  WE  Ac
 
 RM  47      -0  -1      .0          TE  46  ST  ti
 
 ET  25      9-  7-      00             41     ve
 
 HR  24      20  20                  AI      RI    
 
 IN  26      09  09                  D       CH    
 
    7                               PH      AR    
 
 1%                                  AR      D     
 
                                    M       M     
 
 LO                                  #3            
 
 TI                                91            
 
 ON                         4             
 
                                       
 
                                 
 
                                 
 
                          
 
                        
 
                       
 
                     
 
                  
 
                 
 
 CY  00      12  12  00  20  6       RI  38  WE  Ac
 
 CL  37      -0  -1      .0          TE  46  ST  ti
 
 OB  80      8-  7-      00             11     ve
 
 EN  75      20  20                  AI      RI    
 
 ZA  11      09  09                  D       CH    
 
 CT  0                               PH      AR    
 
 IN                                  AR      D     
 
 E                                   M       M     
 
 10                                  #3            
 
                                  91            
 
 MG                         4             
 
                                      
 
 TA                              
 
 BL                              
 
 ET                       
 
                        
 
                       
 
                     
 
                  
 
                 
 
               
 
               
 
               
 
               
 
     00      12  12  00  15  3       RI  81  RU  Ac
 
     40      -0  -1      .0          TE  12  SH  ti
 
     60      2-  7-      00             93     ve
 
     35      20  20                  AI      NE    
 
     70      09  09                  D       IL    
 
     5                               PH       C    
 
                                     AR            
 
                                     M             
 
                                     #3            
 
                                   93            
 
                            8             
 
                                       
 
                                 
 
                                 
 
                          
 
                     
 
                     
 
                     
 
               
 
              
 
 PE  00      11  12  00  59  5       RI  38  WE  Ac
 
 RM  47      -1  -0      .0          TE  22  ST  ti
 
 ET  25      7-  3-      00             77     ve
 
 HR  24      20  20                  AI      RI    
 
 IN        09  09                  D       CH    
 
    7                               PH      AR    
 
 1%                                  AR      D     
 
                                    M       M     
 
 LO                                  #3            
 
 TI                                91            
 
 ON                         4             
 
                                       
 
                                 
 
                                 
 
                          
 
                        
 
                       
 
                     
 
                  
 
                 
 
 PE  00      11  11  00  40  10      RI  38  RU  Ac
 
 NI  09      -0  -1      .0          TE  05  SH  ti
 
 CI  31      3-  9-      00             63     ve
 
 LL  17      20  20                  AI      NE    
 
 IN  20      09  09                  D       IL    
 
    1                               PH       C    
 
 VK                                  AR            
 
                                    M             
 
 25                                  #3            
 
 0                                  91            
 
 MG                         4             
 
                                      
 
 TA                              
 
 BL                              
 
 ET                       
 
                     
 
                     
 
                     
 
                  
 
                 
 
               
 
               
 
               
 
               
 
     00      11  11  00  16  4       RI  80  RU  Ac
 
     40      -1  -1      .0          TE  79  SH  ti
 
     60      0-  9-      00             37     ve
 
     35      20  20                  AI      NE    
 
     70      09  09                  D       IL    
 
     5                               PH       C    
 
                                     AR            
 
                                     M             
 
                                     #3            
 
                                   93            
 
                            8             
 
                                       
 
                                 
 
                                 
 
                          
 
                     
 
                     
 
                     
 
               
 
              
 
     00      11  11  00  15  3       RI  38  RU  Ac
 
     40      -0  -1      .0          TE  05  SH  ti
 
     60      3-  9-      00             64     ve
 
     35      20  20                  AI      NE    
 
     70      09  09                  D       IL    
 
     5                               PH       C    
 
                                     AR            
 
                                     M             
 
                                     #3            
 
                                   91            
 
                            4             
 
                                       
 
                                 
 
                                 
 
                          
 
                     
 
                     
 
                     
 
               
 
              
 
 OX  00      10  11  00  15  3       RI  80  RU  Ac
 
 YC  40      -2  -0      .0          TE  62  SH  ti
 
 OD  60      9-  5-      00             84     ve
 
 ON  51      20  20                  AI      NE    
 
 E-  20      09  09                  D       IL    
 
 AC  1                               PH       C    
 
 ET                                  AR            
 
 AM                                  M             
 
 IN                                  #3            
 
 OP                                93            
 
 HE                         8             
 
 N                                     
 
 5-                              
 
 32                              
 
 5                        
 
                     
 
                     
 
                     
 
                  
 
                 
 
               
 
               
 
               
 
              
 
 OX  00      09  10  00  12  3       RI  37  RU  Ac
 
 YC  40      -2  -0      .0          TE  68  SH  ti
 
 OD  60      8-  8-      00             32     ve
 
 ON  51      20  20                  AI      NE    
 
 E-  20      09  09                  D       IL    
 
 AC  1                               PH       C    
 
 ET                                  AR            
 
 AM                                  M             
 
 IN                                  #3            
 
 OP                                91            
 
 HE                         4             
 
 N                                     
 
 5-                              
 
 32                              
 
 5                        
 
                     
 
                     
 
                     
 
                  
 
                 
 
               
 
               
 
               
 
              
 
     00      09  10  00  18  4       WA  44  RU  Ac
 
     40      -2  -0      .0          L-  76  SH  ti
 
     60      5-  8-      00          MA  47     ve
 
     35      20  20                  RT  6   NE    
 
     70      09  09                         IL    
 
     5                               PH       C    
 
                                     AR            
 
                                     MA            
 
                                     CY            
 
                                                
 
                            #4            
 
                            93         
 
                                   
 
                                 
 
                          
 
                     
 
                     
 
                     
 
               
 
               
 
               
 
 CT  60      09  09  00  30  30      RI  37  WE  Ac
 
 EN  25      -1  -2      .0          TE  49  ST  ti
 
 AT  80      1-  4-      00             19     ve
 
 AL  19      20  20                  AI      RI    
 
    60      09  09                  D       CH    
 
 19  1                               PH      AR    
 
                                    AR      D     
 
 TA                                  M       M     
 
 BL                                  #3            
 
 ET                                 91            
 
                            4             
 
                                       
 
                                 
 
                                 
 
                          
 
                        
 
                       
 
                     
 
                  
 
              
 
 PE  00      09  09  00  40  10      RI  37  RU  Ac
 
 NI  09      -1  -2      .0          TE  49  SH  ti
 
 CI  31      1-  4-      00             20     ve
 
 LL  17      20  20                  AI      NE    
 
 IN  20      09  09                  D       IL    
 
    1                               PH       C    
 
 VK                                  AR            
 
                                    M             
 
 25                                  #3            
 
 0                                  91            
 
 MG                         4             
 
                                      
 
 TA                              
 
 BL                              
 
 ET                       
 
                     
 
                     
 
                     
 
                  
 
                 
 
               
 
               
 
               
 
               
 
 OX  00      09  09  00  12  3       RI  79  RU  Ac
 
 YC  40      -0  -2      .0          TE  90  SH  ti
 
 OD  60      9-  4-      00             93     ve
 
 ON  51      20  20                  AI      NE    
 
 E-  20      09  09                  D       IL    
 
 AC  1                               PH       C    
 
 ET                                  AR            
 
 AM                                  M             
 
 IN                                  #3            
 
 OP                                93            
 
 HE                         8             
 
 N                                     
 
 5-                              
 
 32                              
 
 5                        
 
                     
 
                     
 
                     
 
                  
 
                 
 
               
 
               
 
               
 
              
 
     63      08  08  00  14  7       WA  69  GA  Ac
 
     30      -0  -2      .0          L-  83  IN  ti
 
     40      8-  8-      00          MA  14  EY  ve
 
     71      20  20                  RT  3        
 
     00      08  08                         MI    
 
     1                               PH      CH    
 
                                     AR      AE    
 
                                     MA      L     
 
                                     CY      S     
 
                                                
 
                            #5            
 
                            91         
 
                                   
 
                                 
 
                          
 
                        
 
                        
 
                       
 
               
 
               
 
               
 
 CE  00      07  07  00  28  7       CA  64  LIBBY  Ac
 
 PH  09      -0  -1      .0          RL  52  SE  ti
 
 AL  33      3-  7-      00          IS  42     ve
 
 EX  14      20  20                  LE      T.    
 
 IN  70      08  08                              
 
    5                               DR      LO    
 
 50                                  UG      RE    
 
 0                                          NZ    
 
 MG                                  IN      O     
 
                                  C       MD    
 
 CA                                      
 
 PS                                 LIBBY 
 
 UL                           SE 
 
 E                               
 
                          
 
                        
 
                        
 
                        
 
                    
 
                  
 
                  
 
                  
 
              
 
     00      05  06  00  20  5       CL  17  No  Ac
 
     05      -1  -0      .0          IN  09  t   ti
 
     44      9-  5-      00          IC  62  Av  ve
 
     65      20  20                         ai    
 
     02      08  08                  PH      la    
 
     9                               AR      bl    
 
                                     MA      e     
 
                                     CY            
 
                                                   
 
                                                 
 
                                          
 
                                       
 
                                 
 
                                 
 
                          
 
                       
 
                     
 
                  
 
     00      05  05  00  30  7       CL  17  No  Ac
 
     05      -0  -2      .0          IN  02  t   ti
 
     44      8-  2-      00          IC  90  Av  ve
 
     65      20  20                         ai    
 
     02      08  08                  PH      la    
 
     9                               AR      bl    
 
                                     MA      e     
 
                                     CY            
 
                                                   
 
                                                 
 
                                          
 
                                       
 
                                 
 
                                 
 
                          
 
                       
 
                     
 
                  
 
 OX  00      05  05  00  30  4       RI  73  No  Ac
 
 YC  40      -0  -2      .0          TE  20  t   ti
 
 OD  60      5-  2-      00             17  Av  ve
 
 ON  51      20  20                  AI      ai    
 
 E-  20      08  08                  D       la    
 
 AC  1                               PH      bl    
 
 ET                                  AR      e     
 
 AM                                  M             
 
 IN                                  #3            
 
 OP                                93            
 
 HE                         8             
 
 N                                     
 
 5-                              
 
 32                              
 
 5                        
 
                       
 
                     
 
                     
 
                  
 
                 
 
               
 
               
 
               
 
              
 
     00      04  05  00  30  8       CL  16  No  Ac
 
     40      -2  -0      .0          IN  94  t   ti
 
     60      5-  8-      00          IC  74  Av  ve
 
     35      20  20                         ai    
 
     70      08  08                  PH      la    
 
     5                               AR      bl    
 
                                     MA      e     
 
                                     CY            
 
                                                   
 
                                                 
 
                                          
 
                                       
 
                                 
 
                                 
 
                          
 
                       
 
                     
 
                  
 
     00      04  04  00  20  5       CL  16  No  Ac
 
     40      -1  -2      .0          IN  90  t   ti
 
     60      8-  4-      00          IC  64  Av  ve
 
     35      20  20                         ai    
 
     70      08  08                  PH      la    
 
     5                               AR      bl    
 
                                     MA      e     
 
                                     CY            
 
                                                   
 
                                                 
 
                                          
 
                                       
 
                                 
 
                                 
 
                          
 
                       
 
                     
 
                  
 
                                                                                
                                                                                
                                                                                
                                                                                
                                                                                
                                                                                
                                                                                
                                                                                
                                        
 
Results
                      
 
 
 Labs                
 
 
 
 
 Lab   Lab   Date    Result  Refere  Interp  Status  Commen
 
 Order   Detail                  nces   retati          t     
 
                                 Range   on                    
 
                                                            
 
                                  
 
                
 
 
 
 
 Differential panel, method unspecified - (2017 16:15)
 
                 
 
 
 
 
          LYMPH      25 %     10% -  Normal   complet
 
                   017             50%            ed     
 
                 16:15                                      
 
                                    
 
                  
 
         
 
          Platele    NORMAL                     complet
 
          ts   017                              ed     
 
        [Presen  16:15                                      
 
  ce] in                                       
 
  Blood                              
 
  by             
 
  Light      
 
  microsc     
 
  opy         
 
              
 
              
 
                                                                                
                                     
 
Procedures
                      
 
 
 Procedure  DOS        Code       Location   Performer  Comment  
 
                                                                 
 
                                                 
 
 NEEDLE     67289      [facility]   PAVEZ               
 
 EMG EA   7                     PHYSICIAN                     
 
 EXTREMTY                     S GROUP          
 
 W/PARASPI                 
 
 NL AREA              
 
 COMPLETE      
 
               
 
              
 
 NERVE     36370      [facility]   PAVEZ               
 
 CONDUCTIO  7                     PHYSICIAN                     
 
 N STUDIES                    S GROUP          
 
  3-4                  
 
 STUDIES               
 
               
 
             
 
 [place]     [address]      [facility]            
 
 HEPATITIS  7                     [facility]           
 
  B                     INC        INC       
 
 SURFACE                            
 
 ANTIGEN               
 
               
 
             
 
 SEDIMENTA    19385      [facility]            
 
 TION RATE  7                     [facility]           
 
  RBC                     INC        INC       
 
 NON-AUTOM                           
 
 ATED                  
 
               
 
          
 
 [place]     [address]      [facility]            
 
 THYROID   7                     [facility]           
 
 STIMULATI                    INC        INC       
 
 NG                            
 
 HORMONE              
 
 TSH           
 
               
 
         
 
 [place]     [address]      [facility]            
 
 THYROXINE  7                     [facility]           
 
  TOTAL                       INC        INC       
 
                                     
 
                    
 
 [place]     [address]      [facility]            
 
 BLOOD/URI  7                     [facility]           
 
 C ACID                       INC        INC       
 
                                     
 
                    
 
 RHEUMATOI    88590      [facility]            
 
 D FACTOR   7                     [facility]           
 
 QUANTITAT                    INC        INC       
 
 CLARISSE                                 
 
                       
 
         
 
 COMPREHEN    84496      [facility]            
 
 SIVE   7                     [facility]           
 
 METABOLIC                    INC        INC       
 
  PANEL                              
 
                       
 
            
 
 HEPATITIS    02667      [facility]            
 
  A   7                     [facility]           
 
 ANTIBODY                     INC        INC       
 
 HAAB                                
 
                       
 
          
 
 ANTINUCLE    54884      [facility]            
 
 AR   7                     [facility]           
 
 ANTIBODIE                    INC        INC       
 
 S GARETT                               
 
                       
 
           
 
 HEPATITIS    41615      [facility]            
 
  C   7                     [facility]           
 
 ANTIBODY                     INC        INC       
 
                                     
 
                      
 
 HEMOGLOBI    93657      [facility]            
 
 N   7                     [facility]           
 
 GLYCOSYLA                    INC        INC       
 
 LESLYE A1C                             
 
                       
 
             
 
 BLOOD     29348      [facility]            
 
 COUNT   7                     [facility]           
 
 COMPLETE                     INC        INC       
 
 AUTO&AUTO                           
 
  DIFRNTL              
 
 WBC           
 
               
 
         
 
 HEPATITIS    07466      [facility]            
 
  B CORE   7                     [facility]           
 
 ANTIBODY                     INC        INC       
 
 HBCAB                            
 
 TOTAL                 
 
               
 
           
 
 SUSCEPTIB    84147      [facility]            
 
 LTY STDY   7                     [facility]   [facility]           
 
 ANTIMICRB                    HOLDINGS   HOLDINGS  
 
 IAL                            
 
 MICRO/AGA                       
 
 R DILUTJ      
 
               
 
              
 
 CUL BACT     45833      [facility]            
 
 XCPT   7                     [facility]   [facility]           
 
 URINE                     HOLDINGS   HOLDINGS  
 
 BLOOD/STO                           
 
 OL                        
 
 AEROBIC      
 
 ISOL          
 
               
 
          
 
 CUL BACT     89165      [facility]   [facility]            
 
 AEROBIC   7                     OTTONIEL   OTTONIEL           
 
 ADDL                     HOLDINGS   HOLDINGS  
 
 METHS                            
 
 DEFINITIV                       
 
 E EA ISOL     
 
               
 
               
 
 RADEX     59515      [facility]   7                     [facility] 
 
 MINIMUM 3                           
 
  VIEWS                            
 
               
 
            
 
 CYTP C/V     72996      BIO   BIO            
 
 AUTO THIN  6                     REFERNCE   REFERNCE           
 
  LYR                     LABORATOR  LABORATOR 
 
 PREPJ SCR      IES        IES       
 
  MNL                          
 
 RESCR              
 
 PHYS          
 
               
 
          
 
 IADNA     59977      BIO   BIO            
 
 CHLAMYDIA  6                     REFERNCE   REFERNCE           
 
                      LABORATOR  LABORATOR 
 
 TRACHOMAT      IES        IES       
 
 IS                          
 
 AMPLIFIED             
 
  PROBE TQ     
 
               
 
               
 
 IADNA     09490      BIO   BIO            
 
 TRICHOMON  6                     REFERNCE   REFERNCE           
 
 AS                     LABORATOR  LABORATOR 
 
 VAGINALIS      IES        IES       
 
                           
 
 AMPLIFIED             
 
  PROBE      
 
 TECH          
 
               
 
          
 
 CULTURE     80801      [facility]            
 
 [place]  6                     LUCÍA ZAMORA  LUCIANO                
 
  ANY                                          
 
 SOURCE                        
 
               
 
            
 
 IAD[place] NOS    98839      BIO   BIO            
 
    6                     REFERNCE   REFERNCE           
 
 AMPLIFIED                    LABORATOR  LABORATOR 
 
  PROBE TQ      IES        IES       
 
  EACH                          
 
 ORGANISM              
 
               
 
              
 
 [place]     65116      [facility]            
 
 NEISS[place]  6                     LUCÍA ZAMORA  GLORY                
 
                                           
 
 GONORRHOE                     
 
 AE DIRECT     
 
  PROBE TQ     
 
               
 
               
 
 IADNA     14683      BIO   BIO            
 
 [place]  6                     REFERNCE   REFERNCE           
 
                      LABORATOR  LABORATOR 
 
 GONORRHOE      IES        IES       
 
 AE                          
 
 AMPLIFIED             
 
  PROBE TQ     
 
               
 
               
 
 PHYSICAL   04-  78342      [facility]            
 
 THERAPY   5                     [facility]           
 
 EVALUATIO                    INC        INC       
 
 N                                   
 
                       
 
       
 
 OTHER     6562       [facility]            
 
 REMOVAL   5                     [facility]           
 
 OF                       INC        INC       
 
 REMAINING                           
 
  OVARY              
 
 AND TUBE      
 
               
 
              
 
 OTHER     5459       [facility]            
 
 LYSIS OF   5                     [facility]           
 
 PERITONEA                      INC        INC       
 
 L                            
 
 ADHESIONS             
 
               
 
               
 
 LEVEL IV     71828      [facility]  [facility]           
 
 SURG   5                      LLC                          
 
 PATHOLOGY                                         
 
              
 
 GROSS&LIDA     
 
 ROSCOPIC      
 
 EXAM          
 
               
 
          
 
 ANESTHESI    02774      [facility]  ROBERT GONZALO           
 
 A   5                      ANESTH                      
 
 INTRAPERI                    OF THE             
 
 TONEAL       BLUE       
 
 LOWER ABD               
 
  W/LAPS           
 
 NOS           
 
               
 
         
 
 SALPINGO-    23988      [facility]   MAHOGANY TOD            
 
 OOPHORECT  5                     PHYSICIAN                     
 
 KARLA                     S GROUP             
 
 COMPL/PRT                 
 
 L UNI/BI              
 
 SPX           
 
               
 
         
 
 BLOOD     88007      [facility]            
 
 COUNT   5                     [facility]           
 
 COMPLETE                     INC        INC       
 
 AUTO&AUTO                           
 
  DIFRNTL              
 
 WBC           
 
               
 
         
 
 [place]     45943      [facility]            
 
 TRANSVAGI  5                     [facility]           
 
 NAL                          INC        INC       
 
                                     
 
                 
 
 URNLS DIP    32829      [facility]            
 
    5                     [facility]           
 
 STICK/TAB                    INC        INC       
 
 LET                            
 
 REAGENT              
 
 AUTO      
 
 MICROSCOP     
 
 Y             
 
               
 
       
 
 SMR PRIM     49841      [facility]            
 
 SRC WET   5                     LUCÍA CASTREJON MD          
 
 [facility] AGT                            
 
               
 
              
 
 [place]     [address]      [facility]            
 
 TRANSVAGI  5                     [facility]           
 
 NAL                          INC        INC       
 
                                     
 
                 
 
 INJECTION          [facility]STRE[facility]            
 
    5                     LUCÍA ZAMORA  LUCIANO                
 
 PROGESTER                                         
 
 ONE PER                      
 
 50 MG         
 
               
 
           
 
 THERAPEUT    88746      [facility]            
 
 IC   5                     LUCÍA ZAMORA  LUCIANO                
 
 PROPHYLAC                                         
 
 TIC/DX                      
 
 INJECTION     
 
  SUBQ/IM      
 
               
 
              
 
 BLOOD     33957      [facility]            
 
 COUNT   5                     [facility]           
 
 COMPLETE                     INC        INC       
 
 AUTO&AUTO                           
 
  DIFRNTL              
 
 WBC           
 
               
 
         
 
 COLLECTIO    59844      [facility]            
 
 N VENOUS   5                     [facility]           
 
 BLOOD                     INC        INC       
 
 VENIPUNCT                           
 
 URE                   
 
               
 
         
 
 US PELVIC    19929      [facility]            
 
    5                     [facility]           
 
 NONOBSTET                    INC        INC       
 
 [place]                            
 
 REAL-TIME             
 
  IMAGE      
 
 COMPLETE      
 
               
 
              
 
 HOSPITAL           [facility]            
 
 OBSERVATI  5                     [facility]           
 
 ON                     INC        INC       
 
 SERVICE                            
 
 PER HOUR              
 
               
 
              
 
 HOSPITAL   02-        [facility]            
 
 OBSERVATI  5                     [facility]           
 
 ON                     INC        INC       
 
 SERVICE                            
 
 PER HOUR              
 
               
 
              
 
 BLOOD   02-  30614      [facility]            
 
 COUNT   5                     [facility]           
 
 HEMATOCRI                    INC        INC       
 
 T                                   
 
                       
 
       
 
 LEVEL V   02-  96815      [facility]  [facility]            
 
 SURG   5                      LLC       [place]                
 
 PATHOLOGY                                         
 
                  
 
 GROSS&LIDA     
 
 ROSCOPIC      
 
 EXAM          
 
               
 
          
 
 INJECTION  02-        [facility]            
 
    5                     [facility]           
 
 ONDANSETR                    INC        INC       
 
 ON HCL                            
 
 PER 1 MG              
 
               
 
              
 
 THERAPEUT  02-  91320      [facility]            
 
 IC   5                     [facility]           
 
 INJECTION                    INC        INC       
 
  IV PUSH                            
 
 EACH NEW              
 
 DRUG          
 
               
 
          
 
 INJECTION  02-        [facility]            
 
    5                     [facility]           
 
 ACETAMINO                    INC        INC       
 
 PHEN 10                            
 
 MG                    
 
               
 
        
 
 IV   02-  49516      [facility]            
 
 INFUSION   5                     [facility]           
 
 THERAPY/P                    INC        INC       
 
 ROPHYLAXI                           
 
 S /DX 1ST             
 
  TO 1 HR      
 
               
 
              
 
 ANESTHESI  02-  81428      [facility] VAGINAL  5                      ANESTH   SHE                
 
                      OF THE             
 
 HYSTERECT      BLUE           
 
 KARLA INCL                
 
 BIOPSY             
 
               
 
            
 
 URNLS DIP  02-  98140      [facility]            
 
    5                     [facility]           
 
 STICK/TAB                    INC        INC       
 
 LET                            
 
 REAGENT              
 
 AUTO      
 
 MICROSCOP     
 
 Y             
 
               
 
       
 
 COLLECTIO  02-  28282      [facility]            
 
 N VENOUS   5                     [facility]           
 
 BLOOD                     INC        INC       
 
 VENIPUNCT                           
 
 URE                   
 
               
 
         
 
 VAGINAL   02-  72974      [facility]            
 
 HYSTERECT  5                     [facility]           
 
 KARLA                     INC        INC       
 
 UTERUS                            
 
 250 GM/<              
 
               
 
              
 
 UNLISTED   02-  57728      [facility]            
 
 PX FEMALE  5                     [facility]           
 
  GENITAL                     INC        INC       
 
 SYSTEM                            
 
 NONOBSTET             
 
 RICAL         
 
               
 
           
 
 BLOOD   02-  90664      [facility]            
 
 COUNT   5                     [facility]           
 
 HEMOGLOBI                    INC        INC       
 
 N                                   
 
                       
 
       
 
 THERAPEUT  02-  40822      [facility]            
 
 IC   5                     [facility]           
 
 PROPHYLAC                    INC        INC       
 
 TIC/DX                            
 
 INJECTION             
 
  SUBQ/IM      
 
               
 
              
 
 URINE     77764      [facility]            
 
 PREGNANCY  5                     [facility]           
 
  TEST                     INC        INC       
 
 VISUAL                            
 
 COLOR              
 
 CMPRSN      
 
 METHS         
 
               
 
           
 
 BLOOD     33283      [facility]            
 
 COUNT   5                     [facility]           
 
 COMPLETE                     INC        INC       
 
 AUTO&AUTO                           
 
  DIFRNTL              
 
 WBC           
 
               
 
         
 
 COLLECTIO    18847      [facility]            
 
 N VENOUS   5                     [facility]           
 
 BLOOD                     INC        INC       
 
 VENIPUNCT                           
 
 URE                   
 
               
 
         
 
 URNLS DIP    34167      [facility]            
 
    5                     [facility]           
 
 STICK/TAB                    INC        INC       
 
 LET                            
 
 REAGENT              
 
 AUTO      
 
 MICROSCOP     
 
 Y             
 
               
 
       
 
 SMR PRIM     41952      [facility]STRE[facility]            
 
 SRC WET   5                     [facility]                
 
 [place]OUNT                                          
 
 NFCT AGT                      
 
               
 
              
 
 RADIOLOGI    52529      [facility] [place]   SCALF TANIA           
 
 C EXAM   5                     RADIOLOGY                     
 
 CHEST 2                                          
 
 VIEWS                  
 
 FRONTAL&L     
 
 ATERAL        
 
               
 
            
 
 IMMUNOASS    52171      [facility]   [facility]RIQUE            
 
 AY TUMOR   5                     [facility]           
 
 ANTIGEN                     INC        INC       
 
 QUANTITAT                           
 
 CLARISSE                   
 
               
 
         
 
 COLLECTIO    15453      [facility]   [facility]            
 
 N VENOUS   5                     [facility]           
 
 BLOOD                     INC        INC       
 
 VENIPUNCT                           
 
 URE                   
 
               
 
         
 
 US   01-  32157      [facility]            
 
 TRANSVAGI  5                     [facility]           
 
 NAL                          INC        INC       
 
                                     
 
                 
 
 HANDLG&/O    39889      [facility] CASTREJ[facility]            
 
 R CONVEY   5                     [facility]  [facility]                
 
 OF SPEC                                          
 
 FOR TR                      
 
 OFFICE TO     
 
  LAB          
 
               
 
          
 
 IADNA     16482      [facility] CASTREJ[facility]            
 
 NEISSERIA  5                     [facility]                
 
                                           
 
 GONORRHOE                     
 
 AE DIRECT     
 
  PROBE TQ     
 
               
 
               
 
 URINLS     50042      [facility] CASTRE[facility]            
 
 DIP   5                     [facility]  [facility]                
 
 STICK/TAB                                         
 
 LET                      
 
 REAGNT      
 
 NON-AUTO      
 
 MICRSCPY      
 
               
 
              
 
 CULTURE     24360      [facility] CASTREJ[facility]            
 
 CHLAMYDIA  5                     [facility]  [facility]                
 
  ANY                                          
 
 SOURCE                        
 
               
 
            
 
 CT THORAX    62441      [facility]            
 
    4                     [facility]           
 
 W/CONTRAS                    INC        INC       
 
 T                            
 
 MATERIAL              
 
               
 
              
 
 LOCM           [facility]            
 
 [phone]   4                     [facility]           
 
 MG/ML                     INC        INC       
 
 IODINE                            
 
 CONCENTRA             
 
 TION PER      
 
 ML            
 
               
 
        
 
 RADIOLOGI    26467      [facility]            
 
 C EXAM   4                     [facility]           
 
 CHEST 2                     INC        INC       
 
 VIEWS                            
 
 FRONTAL&L             
 
 ATERAL        
 
               
 
            
 
 ECG     50158      [facility]            
 
 ROUTINE   4                     [facility]   [place]                
 
 ECG                     EMERGENCY            
 
 W/LEAST        PHYSI         
 
 12 LDS                
 
 I&R ONLY             
 
               
 
              
 
 HOSPITAL           [facility]            
 
 OUTPATIEN  4                     [facility]           
 
 T CLIN                     INC        INC       
 
 VISIT                            
 
 ASSESS &              
 
 MGMT PT       
 
               
 
             
 
 IV   10-  57788      [facility]            
 
 INFUSION   4                     [facility]           
 
 THERAPY/P                    INC        INC       
 
 ROPHYLAXI                           
 
 S /DX 1ST             
 
  TO 1 HR      
 
               
 
              
 
 CREATININ  10-  16053      [facility]            
 
 E BLOOD    4                     [facility]           
 
                              INC        INC       
 
                                   
 
             
 
 IV   10-  65309      [facility]            
 
 INFUSION   4                     [facility]           
 
 THERAPY/P                    INC        INC       
 
 ROPHYLAXI                           
 
 S /DX 1ST             
 
  TO 1 HR      
 
               
 
              
 
 DRUG   10-  96544      [facility]            
 
 SCREEN   4                     [facility]           
 
 QUANTITAT                    INC        INC       
 
 CLARISSE                            
 
 VANCOMYCI             
 
 N             
 
               
 
       
 
 IV   10-  07997      [facility]UNE[facility]            
 
 INFUSION   4                     [facility]           
 
 THERAPY/P                    INC        INC       
 
 ROPHYLAXI                           
 
 S /DX 1ST             
 
  TO 1 HR      
 
               
 
              
 
 IV   10-  13081      [facility] NUNEZ            
 
 INFUSION   4                     [facility]   [facility]           
 
 THERAPY/P                    INC        INC       
 
 ROPHYLAXI                           
 
 S /DX 1ST             
 
  TO 1 HR      
 
               
 
              
 
 IV   10-  14535      [facility]            
 
 INFUSION   4                     [facility]           
 
 THERAPY                     INC        INC       
 
 PROPHYLAX                           
 
 IS/DX EA              
 
 HOUR          
 
               
 
          
 
 RADIOLOGI  10-  02599      [facility] NASH            
 
 C EXAM   4                     [facility]             
 
 CHEST 2                     INC                  
 
 VIEWS                         
 
 FRONTAL&L         
 
 ATERAL        
 
               
 
            
 
 CATHETER   10-        [facility]            
 
 INFUS   4                     [facility]           
 
 INSRT                     INC        INC       
 
 PERIPHERA                           
 
 LLY              
 
 CNTRLLY/M     
 
 IDLN          
 
               
 
          
 
 INSJ PRPH  10-  73594      [facility]            
 
  CVC W/O   4                     [facility]           
 
 SUBQ                     INC        INC       
 
 PORT/                            
 
 AGE 3              
 
 YR/>          
 
               
 
          
 
 INCISION   10-  25900      [facility]   CLEMENTE            
 
 &   4                     PHYSICIAN  [place]                
 
 DRAINAGE                     S GROUP              
 
 ABSCESS                      
 
 SIMPLE/SI             
 
 NGLE          
 
               
 
          
 
 IV   10-  31877      [facility]            
 
 INFUSION   4                     [facility]           
 
 THERAPY/P                    INC        INC       
 
 ROPHYLAXI                           
 
 S /DX 1ST             
 
  TO 1 HR      
 
               
 
              
 
 IV   10-  40114      [facility]            
 
 INFUSION   4                     [facility]           
 
 THERAPY/P                    INC        INC       
 
 ROPHYLAXI                           
 
 S /DX 1ST             
 
  TO 1 HR      
 
               
 
              
 
 IV   10-  81872      [facility] NUNEZ            
 
 INFUSION   4                     [facility]   [facility]           
 
 THERAPY                     INC        INC       
 
 PROPHYLAX                           
 
 IS/DX EA              
 
 HOUR          
 
               
 
          
 
 DRUG   10-  10935      [facility] N[facility]EZ            
 
 SCREEN   4                     [facility]   [facility]           
 
 QUANTITAT                    INC        INC       
 
 CLARISSE                            
 
 VANCOMYCI             
 
 N             
 
               
 
       
 
 IV   10-  17374      [facility] NUNEZ            
 
 INFUSION   4                     [facility]           
 
 THERAPY                     INC        INC       
 
 PROPHYLAX                           
 
 IS/DX EA              
 
 HOUR          
 
               
 
          
 
 IV   10-  21261      [facility] NUNEZ            
 
 INFUSION   4                     [facility]           
 
 THERAPY/P                    INC        INC       
 
 ROPHYLAXI                           
 
 S /DX 1ST             
 
  TO 1 HR      
 
               
 
              
 
 IV   10-  12921      [facility]            
 
 INFUSION   4                     [facility]           
 
 THERAPY/P                    INC        INC       
 
 ROPHYLAXI                           
 
 S /DX 1ST             
 
  TO 1 HR      
 
               
 
              
 
 IV   10-  28431      [facility]            
 
 INFUSION   4                     [facility]           
 
 THERAPY                     INC        INC       
 
 PROPHYLAX                           
 
 IS/DX EA              
 
 HOUR          
 
               
 
          
 
 CREATININ  10-  07520      [facility]            
 
 E BLOOD    4                     [facility]           
 
                              INC        INC       
 
                                   
 
             
 
 [place]   10-  52297      [facility] NUNEZ            
 
 UREA   4                     [facility]           
 
 NITROGEN                     INC        INC       
 
 QUANTITAT                           
 
 CLARISSE                   
 
               
 
         
 
 CUL BACT   10-  43382      [facility]            
 
 AEROBIC   4                     [facility]           
 
 ADDL                     INC        INC       
 
 METHS                            
 
 DEFINITIV             
 
 E EA ISOL     
 
               
 
               
 
 SUSCEPTIB  10-  99471      [facility]            
 
 LTY STDY   4                     [facility]           
 
 ANTIMICRB                    INC        INC       
 
 IAL                            
 
 MICRO/AGA             
 
 R DILUTJ      
 
               
 
              
 
 HEMOGLOBI    70333      [facility]            
 
 N   4                     [facility]           
 
 GLYCOSYLA                    INC        INC       
 
 LESLYE A1C                             
 
                       
 
             
 
 HEPATITIS    17362      [facility]            
 
  B CORE   4                     [facility]           
 
 ANTIBODY                     INC        INC       
 
 HBCAB                            
 
 TOTAL                 
 
               
 
           
 
 HEPATITIS    79151      [facility] NATHAN SURF   4                     [facility]           
 
 ANTIBODY                     INC        INC       
 
 HBSAB                               
 
                       
 
           
 
 GENERAL     69243      [facility]            
 
 HEALTH   4                     [facility]           
 
 PANEL                        INC        INC       
 
                                     
 
                   
 
 HEPATITIS    72627      [facility]            
 
  A   4                     [facility]           
 
 ANTIBODY                     INC        INC       
 
 HAAB                                
 
                       
 
          
 
 HEPATITIS    06376      [facility]            
 
  C   4                     [facility]           
 
 ANTIBODY                     INC        INC       
 
                                     
 
                      
 
 LIPID     78746      [facility]            
 
 PANEL      4                     [facility]           
 
                              INC        INC       
 
                                 
 
             
 
 RADEX     49260      [facility]            
 
 SPINE   4                     [facility]           
 
 LUMBOSACR                    INC        INC       
 
 AL                            
 
 MINIMUM 4             
 
  VIEWS        
 
               
 
            
 
 [place]     [address]      [facility] NUNEZ            
 
 VITAMIN A  4                     [facility]           
 
                              INC        INC       
 
                                     
 
             
 
 [place]     [address]      [facility]EZ            
 
 THYROXINE  4                     [facility]           
 
  TOTAL                       INC        INC       
 
                                     
 
                    
 
 CYANOCOBA    73046      [facility] NUNEZ            
 
 CHELSEA   4                     [facility]           
 
 VITAMIN                     INC        INC       
 
 B-12                                
 
                       
 
          
 
 [place]     [address]      [facility] NUNEZ            
 
 HEPATITIS  4                     [facility]           
 
  B                     INC        INC       
 
 SURFACE                            
 
 ANTIGEN               
 
               
 
             
 
 OPHTH     39059      PATI KRUEEGR,           
 
 MEDICAL   0                      SHAHLA GALE           
 
 XM&JULIO CESAR                     W          W         
 
 KEN                            
 
 NEW PT          
 
 1/> VST       
 
               
 
             
 
 DETERMINA    13708      PATI KRUEGER TION   0                      SHAHLA GALE           
 
 REFRACTIV                    W          W         
 
 E STATE                             
 
                   
 
             
 
 LIPID     [id number]      [facility]            
 
 PANEL      0                     [facility] 
 
                                 
 
                         
 
 COLLECTIO    37347      [facility]            
 
 N VENOUS   0                     [facility] 
 
 VENIPUNCT                           
 
 URE                               
 
               
 
         
 
 SERVICES     47274      [facility]            
 
 [facility]   9                     [facility]  [facility]           
 
 OFFICE                                          
 
 OTH/THN                            
 
 REG SCHED     
 
  HOURS        
 
               
 
            
 
 COLLECTIO    47454      [facility]            
 
 N VENOUS   9                     [facility] 
 
 VENIPUNCT                           
 
 URE                               
 
               
 
         
 
 LIPID     [id number]      [facility]            
 
 PANEL      9                     [facility] 
 
                                 
 
                         
 
 GENERAL     09607      [facility]   9                     [facility] 
 
                                     
 
                               
 
 URNLS DIP    90143      [facility]UNEZ            
 
    8                     [facility]           
 
 STICK/TAB                    INC        INC       
 
 LET                            
 
 REAGENT              
 
 AUTO      
 
 MICROSCOP     
 
 Y             
 
               
 
       
 
 COMPREHEN    32347      [facility]UNEZ            
 
 SIVE   8                     [facility]           
 
 METABOLIC                    INC        INC       
 
  PANEL                              
 
                       
 
            
 
 BLOOD     03288      [facility] NUNEZ            
 
 COUNT   8                     [facility]           
 
 COMPLETE                     INC        INC       
 
 AUTO&AUTO                           
 
  DIFRNTL              
 
 WBC           
 
               
 
         
 
 URINE     19721      [facility] NUNEZ            
 
 PREGNANCY  8                     [facility]   [facility]           
 
  TEST                     INC        INC       
 
 VISUAL                            
 
 COLOR              
 
 CMPRSN      
 
 METHS         
 
               
 
           
 
 IV NFS     27790      [facility]            
 
 THER   8                     [facility]           
 
 PROPH/DX                     INC        INC       
 
 1ST >1 HR                           
 
                       
 
               
 
 [place]     [address]      [facility] NUNEZ            
 
 AMYLASE    8                     [facility]           
 
                              INC        INC       
 
                                   
 
             
 
 CULTURE     18713      [facility]            
 
 BACTERIAL  8                     [facility]           
 
                      INC        INC       
 
 QUANTTATI                           
 
 VE COLONY             
 
  COUNT      
 
 URINE         
 
               
 
           
 
 [place]     [address]      [facility]Z            
 
 LIPASE     8                     [facility]           
 
                              INC        INC       
 
                                  
 
             
 
 RADEX     05772      [facility]            
 
 HAND   8                     CO   CO           
 
 [address]  
 
  VIEWS                              
 
                                 
 
            
 
 [place]     [address]      [facility]EZ            
 
 LIPASE     8                     [facility]           
 
                              INC        INC       
 
                                  
 
             
 
 BLOOD     77443      [facility] NUNEZ            
 
 COUNT   8                     [facility]           
 
 COMPLETE                     INC        INC       
 
 AUTO&AUTO                           
 
  DIFRNTL              
 
 WBC           
 
               
 
         
 
 [place]     [address]      [facility] NUNEZ            
 
 AMYLASE    8                     [facility]           
 
                              INC        INC       
 
                                   
 
             
 
 RADEX ABD    56995      [place]   [place],            
 
  COMPL   8                     MEDICAL   [place] [facility]           
 
 AQT ABD                     IMAGING             
 
 W/S/E/D       ASSOCIATE           
 
 VIEWS 1     S          
 
 VIEW CH                 
 
                 
 
             
 
 COMPREHEN    55821      [facility] NUNEZ            
 
 SIVE   8                     [facility]           
 
 METABOLIC                    INC        INC       
 
  PANEL                              
 
                       
 
            
 
 URNLS DIP    46870      [facility] NUNEZ            
 
    8                     [facility]           
 
 STICK/TAB                    INC        INC       
 
 LET                            
 
 REAGENT              
 
 AUTO      
 
 MICROSCOP     
 
 Y             
 
               
 
       
 
 URINE     11541      [facility]UNE[facility]            
 
 PREGNANCY  8                     [facility]           
 
  TEST                     INC        INC       
 
 VISUAL                            
 
 COLOR              
 
 CMPRSN      
 
 METHS         
 
               
 
           
 
 INITIAL     43194      [facility]   [facility]            
 
 INPATIENT  8                     JR,   JR,           
 
  CONSULT                     JEAN-PAUL F  JEAN-PAUL WINSLOW 
 
 NEW/ESTAB                           
 
  PT 80                          
 
 MIN           
 
               
 
         
 
 LEVEL III    12446      PATHOLOGY  PATHOLOGY           
 
  SURG   8                      &    &           
 
 PATHOLOGY                    CYTOLOGY   CYTOLOGY  
 
        LAB        LAB       
 
 [facility]                         
 
 ROSCOPIC              
 
 EXAM          
 
               
 
          
 
 LEVEL IV     38080      PATHOLOGY  PATHOLOGY           
 
 SURG   8                      &    &           
 
 PATHOLOGY                    CYTOLOGY   CYTOLOGY  
 
        LAB        LAB       
 
 [facility]                         
 
 ROSCOPIC              
 
 EXAM          
 
               
 
          
 
 CHOLECYST    65607      [facility] CASTREJON,            
 
 ECTOMY     8                     [facility]           
 
                                                   
 
                                 
 
 ANESTHESI    00918      [facility]                         ANESTH   YUSUF RAN             
 
 INTRAPERI                    OF THE             
 
 TONEAL       [place]         
 
 LOWER ABD               
 
  W/LAPS                
 
 NOS           
 
               
 
         
 
 HYSTEROSC    6812       [facility]            
 
 OPY        8                     [facility]           
 
                             INC        INC       
 
                               
 
             
 
 OTHER     6829       [facility]            
 
 EXCISION   8                     [facility]           
 
 OR                    INC        INC       
 
 DESTRUCTI                           
 
 ON LESION             
 
  UTERUS       
 
               
 
             
 
 OTHER     6909       [facility]            
 
 DILATION   8                     [facility]           
 
 AND                    INC        INC       
 
 CURETTAGE                           
 
  OF              
 
 UTERUS        
 
               
 
            
 
 CHOLECYST    5122       [facility]            
 
 ECTOMY     8                     [facility]           
 
                             INC        INC       
 
                                  
 
             
 
 BLOOD     82114      [facility]            
 
 COUNT   8                     [facility]           
 
 COMPLETE                     INC        INC       
 
 AUTO&AUTO                           
 
  DIFRNTL              
 
 WBC           
 
               
 
         
 
 URNLS DIP    [id number]      [facility] NUNEZ            
 
    8                     [facility]           
 
 STICK/TAB                    INC        INC       
 
 LET                            
 
 REAGENT              
 
 AUTO      
 
 MICROSCOP     
 
 Y             
 
               
 
       
 
 IADNA     56525      [facility]  [place],            
 
 NEISSERIA  8                      [place] [facility]    XIAO E            
 
                                           
 
 GONORRHOE                       
 
 AE      
 
 AMPLIFIED     
 
  PROBE TQ     
 
               
 
               
 
 US     74437      [facility]   [facility]RI[facility]            
 
 TRANSVAGI  8                     [facility]           
 
 NAL                          INC        INC       
 
                                     
 
                 
 
 IADNA     10709      [facility]  [place],            
 
 CHLAMYDIA  8                      [place] [facility]    XIAO E            
 
                                           
 
 TRACHOMAT                       
 
 IS      
 
 AMPLIFIED     
 
  PROBE TQ     
 
               
 
               
 
 CYTP     06923      [facility]  [place],            
 
 CERV/VAG   8                      [place] [facility]    XIAO E            
 
 AUTO THIN                                         
 
  LAYER                        
 
 PREP MNL      
 
 SCREEN        
 
               
 
            
 
                                                                                
                                                                                
                                                                                
                                                                                
                                                                                
                                                                                
                                                                                
                                                                                
                                                                                
                                                                                
                                                                                
                                                                                
                                                                                
                                                                                
                                                                                
                                                                                
                                                                                
                                                                                
                                                                                
                                       
 
Encounters
                      
 
 
 Encounter  Start   End Date   Code       Location   Performer
 
  Type      Date                                                 
 
                                                        
 
                
 
 OFFICE     76944      [facility]   PETTEY   
 
 OUTPATIEN  7          7                     PHYSICIAN           
 
 T VISIT                                S GROUP        
 
 10                    
 
 MINUTES               
 
               
 
             
 
 HOSPITAL                [facility]            
 
 -   7          7                     [facility]            
 
 OUTPATIEN                                   INC                 
 
 T                                             
 
                   
 
       
 
 OFFICE     64864      [facility]   PETTEY   
 
 OUTPATIEN  7          7                     PHYSICIAN           
 
 T NEW 30                                S GROUP        
 
 MINUTES                     
 
                       
 
             
 
 HOSPITAL                [facility]            
 
 -   7          7                     [facility]            
 
 OUTPATIEN                                   INC                 
 
 T                                             
 
                   
 
       
 
 OFFICE     06224      [facility]   CLEMENTE   
 
 OUTPATIEN  7          7                     PHYSICIAN           
 
 T VISIT                                S GROUP        
 
 25                    
 
 MINUTES               
 
               
 
             
 
 OFFICE     38459      [facility]  7          7                     HEALTH            
 
 T VISIT                                SOLUTIONS      
 
 15           IN       
 
 MINUTES                 
 
                   
 
             
 
 OFFICE     36544      [facility]  7          7                     HEALTH            
 
 T VISIT                                SOLUTIONS      
 
 15           IN       
 
 MINUTES                 
 
                   
 
             
 
 OFFICE     27374      [facility]  7          7                     HEALTH            
 
 T VISIT                                SOLUTIONS      
 
 15           IN       
 
 MINUTES                 
 
                   
 
             
 
 EMERGENCY    94423      [facility]
 
    7          7                     [facility]                               EMERGENCY         
 
 T VISIT           SERV    
 
 MODERATE                
 
 SEVERITY             
 
               
 
              
 
 [facility]                SHAHZ[place]ox [place]            
 
 -   7          7                     [facility]           
 
 T                                             
 
                         
 
       
 
 [facility]     18070      [facility]            
 
 T VISIT                                SOLUTIONS      
 
 15           IN       
 
 MINUTES                 
 
                   
 
             
 
 OFFICE     75730      [facility] 
 
 OUTPATIEN  6          6                     [facility]      
 
 T VISIT                                URGENT            
 
 25          TREAT       
 
 MINUTES                 
 
                     
 
             
 
 OFFICE     62148      [facility]   [place] 
 
 [facility]                     [facility]      
 
 T VISIT                                URGENT            
 
 25          TREAT       
 
 MINUTES                 
 
                     
 
             
 
 PERIODIC     58549      [facility] CASTREJ[facility] 
 
 PREVENTIV  6          6                     [facility]  [facility]      
 
 E MED EST                                                   
 
  PATIENT                      
 
 18-39 YRS     
 
               
 
               
 
 OFFICE     03249      [facility]   [facility]MENT[facility] 
 
 OUTPATIEN  5          5                     PHYSICIAN  [place]      
 
 T VISIT                                S GROUP             
 
 15                      
 
 MINUTES               
 
               
 
             
 
 OFFICE   10-  10-  72100      [facility]   [facility] 
 
 OUTPATIEN  5          5                     PHYSICIAN  [place]      
 
 T VISIT                                S GROUP             
 
 15                      
 
 MINUTES               
 
               
 
             
 
 OFFICE     57766      [facility]   [facility] 
 
 OUTPATIEN  5          5                     PH[facility]ICIAN  [place]      
 
 T VISIT                                S GROUP             
 
 10                      
 
 MINUTES               
 
               
 
             
 
 HOSPITAL   04-  04-             [facility]            
 
 -   5          5                     [facility]            
 
 OUTPATIEN                                   INC                 
 
                                              
 
                   
 
       
 
 HOSPITAL   03-  03-             [facility]            
 
 -   5          5                     [facility]            
 
 INPATIENT                                   INC                 
 
                                               
 
                   
 
 OFFICE     66097      [facility]STREJ[facility] 
 
 OUTPATIEN  5          5                     [facility]      
 
 T VISIT                                                    
 
 25                      
 
 MINUTES       
 
               
 
             
 
 HOSPITAL                [facility]            
 
 -   5          5                     [facility]            
 
 OUTPATIEN                                   INC                 
 
                                              
 
                   
 
       
 
 HOSPITAL                [facility]            
 
 -   5          5                     [facility]            
 
 OUTPATIEN                                   INC                 
 
 T                                             
 
                   
 
       
 
 OFFICE     47713      [facility] 
 
 OUTPATIEN  5          5                     [facility]      
 
 T VISIT                                                    
 
 25                      
 
 MINUTES       
 
               
 
             
 
 HOSPITAL                [facility]            
 
 -   5          5                     [facility]            
 
 OUTPATIEN                                   INC                 
 
 T                                             
 
                   
 
       
 
 OFFICE     52808      [facility]RE[facility] 
 
 OUTPATIEN  5          5                     [facility]      
 
 T VISIT                                                    
 
 15                      
 
 MINUTES       
 
               
 
             
 
 HOSPITAL   02-  02-             [facility]            
 
 -   5          5                     [facility]            
 
 OUTPATIEN                                   INC                 
 
 T                                             
 
                   
 
       
 
 HOSPITAL                [facility]            
 
 -   5          5                     [facility]            
 
 OUTPATIEN                                   INC                 
 
 T                                             
 
                   
 
       
 
 OFFICE     35969      [facility] 
 
 OUTPATIEN  5          5                     [facility]      
 
 T VISIT                                                    
 
 15                      
 
 MINUTES       
 
               
 
             
 
 OFFICE     47489      [facility] 
 
 OUTPATIEN  5          5                     PHYSICIAN  [place]      
 
 T VISIT                                S GROUP             
 
 10                      
 
 MINUTES               
 
               
 
             
 
 HOSPITAL                [facility]            
 
 -   5          5                     [facility]            
 
 OUTPATIEN                                   INC                 
 
 T                                             
 
                   
 
       
 
 OFFICE     53397      [facility] 
 
 OUTPATIEN  5          5                     [facility]      
 
 T VISIT                                                    
 
 15                      
 
 MINUTES       
 
               
 
             
 
 HOSPITAL   01-  01-             [facility]            
 
 -   5          5                     [facility]            
 
 OUTPATIEN                                   INC                 
 
 T                                             
 
                   
 
       
 
 INITIAL     52524      [facility] 
 
 PREVENTIV  5          5                     [facility]                                                    
 
 MEDICINE                      
 
 NEW PT      
 
 AGE      
 
 18-39YRS      
 
               
 
              
 
 [facility]                [facility]            
 
 -   4          4                     [facility]            
 
 OUTPATIEN                                   [facility]                [facility]            
 
 -   4          4                     [facility]            
 
 OUTPATIEN                                   INC                 
 
 T                                             
 
                   
 
       
 
 OFFICE     03866      [facility]   [facility]MENTE 
 
 OUTPATIEN  4          4                     PHYSICIAN  [place]      
 
 T VISIT                                S GROUP             
 
 10                      
 
 MINUTES               
 
               
 
             
 
 EMERGENCY    88559      [facility] 
 
  DEPT   4          4                     [facility]   [place]      
 
 VISIT                                EMERGENCY           
 
 HIGH           PHYSI      
 
 SEVERITY&               
 
 THREAT             
 
 [facility]                [facility]            
 
 -   4          4                     [facility]            
 
 [facility]   [facility]  10-             [facility]            
 
 -   4          4                     [facility]            
 
 [facility]   10[facility]  10-             [facility]            
 
 -   4          4                     [facility]            
 
 [facility]   10-  10-             [facility]            
 
 -   4          4                     [facility]            
 
 [facility]   10-  10-             [facility]            
 
 -   4          4                     [facility]            
 
 [facility]   10[facility]  10-             [facility]            
 
 -   4          4                     [facility]            
 
 [facility]   10-  10-             [facility]            
 
 -   4          4                     [facility]            
 
 [facility]   10-  10-             [facility]            
 
 -   4          4                     [facility]            
 
 [facility]   10-  10-             [facility]            
 
 -   4          4                     [facility]            
 
 [facility]                                   [facility]                 
 
 T                                             
 
                   
 
       
 
 OFFICE   10-  10-  24397      [facility] 
 
 [facility]  4          4                     [facility]  [place]      
 
 T VISIT                                S GROUP             
 
 10                      
 
 MINUTES               
 
               
 
             
 
 EMERGENCY  10-  10-  56065      [facility] 
 
  DEPT   4          4                     [facility]   [place]      
 
 VISIT                                EMERGENCY           
 
 HIGH           PHYSI      
 
 SEVERITY&               
 
 THREAT             
 
 [facility]   10-  10-             [facility]            
 
 -   4          4                     [facility]            
 
 OUTPATIEN                                   INC                 
 
 T                                             
 
                   
 
       
 
 OFFICE     79008      [facility]   [facility] 
 
 OUTPATIEN  4          4                     PHYSICIAN  [place]      
 
 T VISIT                                S GROUP             
 
 15                      
 
 MINUTES               
 
               
 
             
 
 OFFICE     18835      [facility] 
 
 [facility]  4          4                     [facility]  [place]      
 
 T VISIT                                S GROUP             
 
 10                      
 
 MINUTES               
 
               
 
             
 
 HOSPITAL                [facility]            
 
 -   4          4                     [facility]            
 
 OUTPATIEN                                   INC                 
 
 T                                             
 
                   
 
       
 
 EMERGENCY    71068      [facility] 
 
    4          4                     [facility]   PAT      
 
 DEPARTMEN                               EMERGENCY           
 
 T VISIT           PHYSI      
 
 MODERATE                
 
 SEVERITY             
 
               
 
              
 
 OFFICE     78078      [facility]   [facility], 
 
 OUTPATIEN  0          0                     [facility]
 
 T VISIT                                                    
 
 15                            
 
 MINUTES       
 
               
 
             
 
 OFFICE     81575      [facility] 
 
 OUTPATIEN  0          0                     [facility]EX NATHAN   
 
 T NEW 30                                                    
 
 MINUTES                       
 
               
 
             
 
 EMERGENCY  04-  04-  35655      [facility], 
 
    0          0                     [facility]
 
 DEPARTMEN                               EMERGENCY           
 
 T VISIT           PHYS INC         
 
 HIGH/URGE               
 
 NT                
 
 SEVERITY      
 
               
 
              
 
 OFFICE     66018      [facility] 
 
 OUTPATIEN  0          0                     [facility]
 
 T VISIT                                                    
 
 15                            
 
 MINUTES       
 
               
 
             
 
 EMERGENCY    86520      [facility]  [facility], 
 
    0          0                     [facility]   [place] 
 
 DEPARTMEN                               EMERGENCY  A        
 
 T VISIT           PHYS INC          
 
 HIGH/URGE               
 
 NT                
 
 SEVERITY      
 
               
 
              
 
 HOSPITAL                [place]            
 
 -   0          0                     [facility]           
 
 T                                             
 
                         
 
       
 
 HOSPITAL                [place]            
 
 -   9          9                     [facility]           
 
 T                                             
 
                         
 
       
 
 EMERGENCY    96404      [facility], 
 
    8          8                     [facility]
 
 DEPARTMEN                               EMERGENCY           
 
 T VISIT           PHYS INC         
 
 MODERATE                
 
 SEVERITY                
 
               
 
              
 
 OFFICE     35405      [facility]  8          8                     [facility] 
 
 T VISIT                                                    
 
 15                           
 
 MINUTES       
 
               
 
             
 
 HOSPITAL                [facility]            
 
 -   8          8                     [facility]            
 
 OUTPATIEN                                   INC                 
 
 T                                             
 
                   
 
       
 
 EMERGENCY    25042      [facility]            
 
    8          8                     [facility]            
 
 DEPARTMEN                               INC                 
 
 T VISIT                            
 
 HIGH/URGE         
 
 NT      
 
 SEVERITY      
 
               
 
              
 
 HOSPITAL                [place]            
 
 -   8          8                     [facility]            
 
 T                                             
 
                        
 
       
 
 OFFICE     88834      [facility] OUTPATIEN  8          8                     [facility]   
 
 T VISIT                                                    
 
 15                      
 
 MINUTES       
 
               
 
             
 
 HOSPITAL                [facility]            
 
 -   8          8                     [facility]            
 
 OUTPATIEN                                   INC                 
 
 T                                             
 
                   
 
       
 
 EMERGENCY    00625      [facility]            
 
    8          8                     [facility]            
 
 DEPARTMEN                               INC                 
 
 T VISIT                            
 
 MODERATE          
 
 SEVERITY      
 
               
 
              
 
 HOSPITAL                [facility]            
 
 -   8          8                     [facility]            
 
 INPATIENT                                   INC                 
 
                                               
 
                   
 
 HOSPITAL                [facility]            
 
 -   8          8                     [facility]            
 
 OUTPATIEN                                   INC                 
 
 T                                             
 
                   
 
       
 
 OFFICE     50192      [facility] KATZ  8          8                     [facility] 
 
 T VISIT                                                    
 
 15                           
 
 MINUTES       
 
               
 
             
 
 HOSPITAL                [facility]            
 
 -   8          8                     [facility]            
 
 [facility]                 
 
 T

## 2019-03-14 ENCOUNTER — HOSPITAL ENCOUNTER (OUTPATIENT)
Age: 39
End: 2019-03-14
Payer: MEDICAID

## 2019-03-14 DIAGNOSIS — Z79.899: Primary | ICD-10-CM

## 2019-03-14 PROCEDURE — 80353 DRUG SCREENING COCAINE: CPT

## 2019-03-14 PROCEDURE — 80305 DRUG TEST PRSMV DIR OPT OBS: CPT

## 2019-03-21 LAB — BENZOYLECGONINE (GC/MS): 3805 NG/ML

## 2020-04-15 ENCOUNTER — HOSPITAL ENCOUNTER (OUTPATIENT)
Age: 40
End: 2020-04-15
Payer: MEDICAID

## 2020-04-15 DIAGNOSIS — K85.90: Primary | ICD-10-CM

## 2020-04-15 LAB
ALBUMIN LEVEL: 4.6 G/DL (ref 3.5–5)
ALBUMIN/GLOB SERPL: 1.4 {RATIO} (ref 1.1–1.8)
ALP ISO SERPL-ACNC: 77 U/L (ref 38–126)
ALT SERPLBLD-CCNC: 32 U/L (ref 12–78)
AMYLASE SERPL-CCNC: 36 U/L (ref 30–110)
ANION GAP SERPL CALC-SCNC: 16.8 MEQ/L (ref 5–15)
AST SERPL QL: 39 U/L (ref 14–36)
BILIRUBIN,TOTAL: 0.1 MG/DL (ref 0.2–1.3)
BUN SERPL-MCNC: 12 MG/DL (ref 7–17)
CALCIUM SPEC-MCNC: 10.3 MG/DL (ref 8.4–10.2)
CHLORIDE SPEC-SCNC: 102 MMOL/L (ref 98–107)
CHOLEST SPEC-SCNC: 171 MG/DL (ref 140–200)
CO2 SERPL-SCNC: 27 MMOL/L (ref 22–30)
CREAT BLD-SCNC: 0.7 MG/DL (ref 0.52–1.04)
ESTIMATED GLOMERULAR FILT RATE: 92 ML/MIN (ref 60–?)
GFR (AFRICAN AMERICAN): 112 ML/MIN (ref 60–?)
GLOBULIN SER CALC-MCNC: 3.3 G/DL (ref 1.3–3.2)
GLUCOSE: 104 MG/DL (ref 74–100)
HCT VFR BLD CALC: 50.9 % (ref 37–47)
HDLC SERPL-MCNC: 35 MG/DL (ref 40–60)
HGB BLD-MCNC: 15.9 G/DL (ref 12.2–16.2)
LIPASE: 99 U/L (ref 23–300)
MCHC RBC-ENTMCNC: 31.2 G/DL (ref 31.8–35.4)
MCV RBC: 94.9 FL (ref 81–99)
MEAN CORPUSCULAR HEMOGLOBIN: 29.7 PG (ref 27–31.2)
PLATELET # BLD: 446 K/MM3 (ref 142–424)
POTASSIUM: 4.8 MMOL/L (ref 3.5–5.1)
PROT SERPL-MCNC: 7.9 G/DL (ref 6.3–8.2)
RBC # BLD AUTO: 5.36 M/MM3 (ref 4.2–5.4)
SODIUM SPEC-SCNC: 141 MMOL/L (ref 136–145)
TRIGLYCERIDES: 144 MG/DL (ref 30–150)
WBC # BLD AUTO: 10.9 K/MM3 (ref 4.8–10.8)

## 2020-04-15 PROCEDURE — 82150 ASSAY OF AMYLASE: CPT

## 2020-04-15 PROCEDURE — 85025 COMPLETE CBC W/AUTO DIFF WBC: CPT

## 2020-04-15 PROCEDURE — 80053 COMPREHEN METABOLIC PANEL: CPT

## 2020-04-15 PROCEDURE — 83690 ASSAY OF LIPASE: CPT

## 2020-04-15 PROCEDURE — 80061 LIPID PANEL: CPT

## 2020-05-04 ENCOUNTER — OFFICE (OUTPATIENT)
Dept: URBAN - METROPOLITAN AREA CLINIC 4 | Facility: CLINIC | Age: 40
End: 2020-05-04
Payer: MEDICAID

## 2020-05-04 DIAGNOSIS — R11.2 NAUSEA WITH VOMITING, UNSPECIFIED: ICD-10-CM

## 2020-05-04 DIAGNOSIS — R19.4 CHANGE IN BOWEL HABIT: ICD-10-CM

## 2020-05-04 DIAGNOSIS — K85.90 ACUTE PANCREATITIS WITHOUT NECROSIS OR INFECTION, UNSPECIFIE: ICD-10-CM

## 2020-05-04 DIAGNOSIS — R10.12 LEFT UPPER QUADRANT PAIN: ICD-10-CM

## 2020-05-04 DIAGNOSIS — R93.2 ABNORMAL FINDINGS ON DIAGNOSTIC IMAGING OF LIVER AND BILIARY: ICD-10-CM

## 2020-05-04 PROCEDURE — 99203 OFFICE O/P NEW LOW 30 MIN: CPT | Mod: 95 | Performed by: INTERNAL MEDICINE

## 2020-07-03 ENCOUNTER — HOSPITAL ENCOUNTER (EMERGENCY)
Age: 40
Discharge: HOME | End: 2020-07-03
Payer: MEDICAID

## 2020-07-03 VITALS
RESPIRATION RATE: 20 BRPM | SYSTOLIC BLOOD PRESSURE: 138 MMHG | TEMPERATURE: 98.06 F | DIASTOLIC BLOOD PRESSURE: 81 MMHG | HEART RATE: 91 BPM | OXYGEN SATURATION: 100 %

## 2020-07-03 VITALS
OXYGEN SATURATION: 100 % | DIASTOLIC BLOOD PRESSURE: 81 MMHG | TEMPERATURE: 98.06 F | RESPIRATION RATE: 20 BRPM | HEART RATE: 91 BPM | SYSTOLIC BLOOD PRESSURE: 138 MMHG

## 2020-07-03 VITALS — BODY MASS INDEX: 21.6 KG/M2

## 2020-07-03 DIAGNOSIS — Z90.09: ICD-10-CM

## 2020-07-03 DIAGNOSIS — J02.0: Primary | ICD-10-CM

## 2020-07-03 PROCEDURE — 87880 STREP A ASSAY W/OPTIC: CPT

## 2020-07-03 PROCEDURE — 99201: CPT

## 2020-07-03 PROCEDURE — 81003 URINALYSIS AUTO W/O SCOPE: CPT

## 2021-04-26 NOTE — EXTERNAL MEDICAL SUMMARY RPT
Patient Summary
 Created on: 10/06/2017
 
ANTONI NAIR
: 76
Sex: Undifferentiated
 
Demographics
 
 
 
 Preferred Language  English
 
 Marital Status  Unknown
 
 Latter-day Affiliation  Unknown
 
 Race  Unknown
 
 Ethnic Group  Unknown
 
 
Author
 
 
 
 Author            ALFREDO
 
 Address  Unknown
 
 Phone  [email or web address]
 
                                                                
 
Immunization
                                    No patient found. Refill